# Patient Record
Sex: FEMALE | Race: WHITE | NOT HISPANIC OR LATINO | Employment: OTHER | URBAN - METROPOLITAN AREA
[De-identification: names, ages, dates, MRNs, and addresses within clinical notes are randomized per-mention and may not be internally consistent; named-entity substitution may affect disease eponyms.]

---

## 2018-03-07 PROBLEM — R44.3 HALLUCINATIONS: Status: ACTIVE | Noted: 2018-03-07

## 2019-02-20 PROBLEM — F23 ACUTE PSYCHOSIS: Status: ACTIVE | Noted: 2019-02-20

## 2019-02-21 PROBLEM — F33.2 SEVERE RECURRENT MAJOR DEPRESSION WITHOUT PSYCHOTIC FEATURES: Status: ACTIVE | Noted: 2019-02-21

## 2019-02-21 PROBLEM — F15.20 METHAMPHETAMINE USE DISORDER, SEVERE: Status: ACTIVE | Noted: 2019-02-21

## 2019-02-21 PROBLEM — F23 BRIEF PSYCHOTIC DISORDER: Status: ACTIVE | Noted: 2019-02-21

## 2019-02-28 ENCOUNTER — PATIENT OUTREACH (OUTPATIENT)
Dept: ADMINISTRATIVE | Facility: CLINIC | Age: 59
End: 2019-02-28

## 2019-04-09 PROBLEM — F39 MOOD DISORDER: Status: ACTIVE | Noted: 2019-04-09

## 2019-07-30 PROBLEM — F20.0 PARANOID SCHIZOPHRENIA: Status: ACTIVE | Noted: 2019-07-30

## 2019-08-08 ENCOUNTER — PATIENT OUTREACH (OUTPATIENT)
Dept: ADMINISTRATIVE | Facility: CLINIC | Age: 59
End: 2019-08-08

## 2019-08-08 NOTE — PATIENT INSTRUCTIONS
Depression  Depression is one of the most common mental health problems today. It is not just a state of unhappiness or sadness. It is a true disease. The cause seems to be related to a decrease in chemicals that transmit signals in the brain. Having a family history of depression, alcoholism, or suicide increases the risk. Chronic illness, chronic pain, migraine headaches and high emotional stress also increase the risk.  Depression is something we tend to recognize in others, but may have a hard time seeing in ourselves. It can show in many physical and emotional ways:  · Loss of appetite  · Over-eating  · Not being able to sleep  · Sleeping too much  · Tiredness not related to physical exertion  · Restlessness or irritability  · Slowness of movement or speech  · Feeling depressed or withdrawn  · Loss of interest in things you once enjoyed  · Trouble concentrating, poor memory, trouble making decisions  · Thoughts of harming or killing oneself, or thoughts that life is not worth living  · Low self-esteem  The treatment for depression may include both medicine and psychotherapy. Antidepressants can reduce suffering and can improve the ability to function during the depressed period. Therapy can offer emotional support and help you understand emotional factors that may be causing the depression.  Home care  · On-going care and support helps people manage this disease.  Find a healthcare provider and therapist who meet your needs. Seek help when you feel like you may be getting ill.  · Be kind to yourself. Make it a point to do things that you enjoy (gardening, walking in nature, going to a movie, etc.). Reward yourself for small successes.  · Take care of your physical body. Eat a balanced diet (low in saturated fat and high in fruits and vegetables). Exercise at least 3 times a week for 30 minutes. Even mild-moderate exercise (like brisk walking) can make you feel better.  · Avoid alcohol, which can make  depression worse.  · Take medicine as prescribed.  · Tell each of your healthcare providers about all of the prescription drugs, over-the-counter medicines, vitamins, and supplements you take. Certain supplements interact with medicines and can result in dangerous side effects. Ask your pharmacist when you have questions about drug interactions.  · Talk with your family and trusted friends about your feelings and thoughts. Ask them to help you recognize behavior changes early so you can get help and, if needed, medicine can be adjusted.  Follow-up care  Follow up with your healthcare provider, or as advised.  Call 911  Call 911 if you:  · Have suicidal thoughts, a suicide plan, and the means to carry out the plan  · Have trouble breathing  · Are very confused  · Feel very drowsy or have trouble awakening  · Faint or lose consciousness  · Have new chest pain that becomes more severe, lasts longer, or spreads into your shoulder, arm, neck, jaw or back  When to seek medical advice  Call your healthcare provider right away if any of these occur:  · Feeling extreme depression, fear, anxiety, or anger toward yourself or others  · Feeling out of control  · Feeling that you may try to harm yourself or another  · Hearing voices that others do not hear  · Seeing things that others do not see  · Cant sleep or eat for 3 days in a row  · Friends or family express concern over your behavior and ask you to seek help  Date Last Reviewed: 9/29/2015  © 8432-3162 [employer]. [address] [place] [address], [place] [address]. All rights reserved. This information is not intended as a substitute for professional medical care. Always follow your healthcare professional's instructions.

## 2019-09-23 PROBLEM — F32.1 CURRENT MODERATE EPISODE OF MAJOR DEPRESSIVE DISORDER: Status: ACTIVE | Noted: 2019-09-23

## 2019-10-04 ENCOUNTER — PATIENT OUTREACH (OUTPATIENT)
Dept: ADMINISTRATIVE | Facility: CLINIC | Age: 59
End: 2019-10-04

## 2019-10-04 NOTE — PATIENT INSTRUCTIONS
Depression  Depression is one of the most common mental health problems today. It is not just a state of unhappiness or sadness. It is a true disease. The cause seems to be related to a decrease in chemicals that transmit signals in the brain. Having a family history of depression, alcoholism, or suicide increases the risk. Chronic illness, chronic pain, migraine headaches and high emotional stress also increase the risk.  Depression is something we tend to recognize in others, but may have a hard time seeing in ourselves. It can show in many physical and emotional ways:  · Loss of appetite  · Over-eating  · Not being able to sleep  · Sleeping too much  · Tiredness not related to physical exertion  · Restlessness or irritability  · Slowness of movement or speech  · Feeling depressed or withdrawn  · Loss of interest in things you once enjoyed  · Trouble concentrating, poor memory, trouble making decisions  · Thoughts of harming or killing oneself, or thoughts that life is not worth living  · Low self-esteem  The treatment for depression may include both medicine and psychotherapy. Antidepressants can reduce suffering and can improve the ability to function during the depressed period. Therapy can offer emotional support and help you understand emotional factors that may be causing the depression.  Home care  · On-going care and support helps people manage this disease.  Find a healthcare provider and therapist who meet your needs. Seek help when you feel like you may be getting ill.  · Be kind to yourself. Make it a point to do things that you enjoy (gardening, walking in nature, going to a movie, etc.). Reward yourself for small successes.  · Take care of your physical body. Eat a balanced diet (low in saturated fat and high in fruits and vegetables). Exercise at least 3 times a week for 30 minutes. Even mild-moderate exercise (like brisk walking) can make you feel better.  · Avoid alcohol, which can make  depression worse.  · Take medicine as prescribed.  · Tell each of your healthcare providers about all of the prescription drugs, over-the-counter medicines, vitamins, and supplements you take. Certain supplements interact with medicines and can result in dangerous side effects. Ask your pharmacist when you have questions about drug interactions.  · Talk with your family and trusted friends about your feelings and thoughts. Ask them to help you recognize behavior changes early so you can get help and, if needed, medicine can be adjusted.  Follow-up care  Follow up with your healthcare provider, or as advised.  Call 911  Call 911 if you:  · Have suicidal thoughts, a suicide plan, and the means to carry out the plan  · Have trouble breathing  · Are very confused  · Feel very drowsy or have trouble awakening  · Faint or lose consciousness  · Have new chest pain that becomes more severe, lasts longer, or spreads into your shoulder, arm, neck, jaw or back  When to seek medical advice  Call your healthcare provider right away if any of these occur:  · Feeling extreme depression, fear, anxiety, or anger toward yourself or others  · Feeling out of control  · Feeling that you may try to harm yourself or another  · Hearing voices that others do not hear  · Seeing things that others do not see  · Cant sleep or eat for 3 days in a row  · Friends or family express concern over your behavior and ask you to seek help  Date Last Reviewed: 9/29/2015  © 8238-0756 [employer]. [address] [place] [address], [place] [address]. All rights reserved. This information is not intended as a substitute for professional medical care. Always follow your healthcare professional's instructions.

## 2020-01-21 PROBLEM — F32.A DEPRESSION: Status: ACTIVE | Noted: 2020-01-21

## 2020-01-22 PROBLEM — F33.3 SEVERE RECURRENT MAJOR DEPRESSION WITH PSYCHOTIC FEATURES: Status: ACTIVE | Noted: 2020-01-22

## 2020-01-31 ENCOUNTER — PATIENT OUTREACH (OUTPATIENT)
Dept: ADMINISTRATIVE | Facility: CLINIC | Age: 60
End: 2020-01-31

## 2020-01-31 RX ORDER — CLONAZEPAM 1 MG/1
1 TABLET ORAL 2 TIMES DAILY
COMMUNITY

## 2020-01-31 RX ORDER — IBUPROFEN 200 MG
200 TABLET ORAL EVERY 6 HOURS PRN
COMMUNITY
End: 2022-03-31

## 2020-01-31 NOTE — PATIENT INSTRUCTIONS
Schizophrenia (General Type)  Schizophrenia is a chronic, often disabling mental health disorder that makes functioning in work and society difficult. It is a type of psychosis, and involves perceiving reality differently from those around you. The difference been reality and what you think become blurred in your mind.  The cause of schizophrenia is not yet known. It is believed to be a result of genetic and biological factors (brain chemistry and structure). Schizophrenia does run in families and occurs in about 1% of the adult population. Environmental factors may also have a role in schizophrenia. These may include where you grew up, toxins, and infections.  Symptoms include:  · Hallucinations (seeing or hearing things that are not there)  · Delusions (false beliefs)  · Disorganized thinking and speech  · Social withdrawal  · Severe anxiety  · Feeling unreal  · Paranoia  · Insomnia  · Trouble thinking or concentrating clearly  · Depression, feeling suicidal  · Withdrawal from those around you  Medicines and therapy can help with many of the symptoms and allow for better daily function and quality of life. These medicines take 2 to 4 weeks to start working and 6 to 8 weeks to take full effect.  It is common to feel that you are not ill and that you don't need treatment. It is important to accept the support of friends and family in continuing to take your medicine.  Home care  · Ongoing care and support helps manage this disease. Find a healthcare provider and therapist who meet your needs. Seek help when you feel like your symptoms are getting worse.  · Tell each of your healthcare providers about all of the prescription medicines, over-the-counter medicines, and supplements you take. Certain supplements interact with medicines and can cause dangerous side effects. Ask your pharmacist when you have questions about drug interactions.  · Be sure to take all of your medicine as directed and get regular blood work  to check your medicine level and your overall health. Take the medicines and get the follow-up lab work as prescribed, even if you think you dont need it.  · Seek support from trusted friends or family by talking about your feelings and thoughts. Ask them to help you recognize behavior changes early so you can get help and medicines can be adjusted.  · If you are having trouble managing workplace issues, or caring for yourself because of your schizophrenia, contact your local Americans with Disabilities (ADA) office to see if they can help. The [place] of [employer] operates a toll-free ADA information line at: 222.832.6031 (voice) or 628-801-5527 (TTY). They can help you locate a local office.  Follow-up care  Follow up with your doctor or therapist , or as advised.  Call 911  Call 911 if you:  · Have suicidal thoughts, a suicide plan, and the means to carry out the plan  · Have trouble breathing  · Are very confused  · Are very drowsy or have trouble awakening  · Feel faint or lose consciousness  · Have rapid heart rate, very low heart rate, or a new irregular heart rate  · Have a seizure  When to seek medical advice  Call your healthcare provider right away if any of these occur:  · Your symptoms are getting worse  · Family or friends express concern over your behavior and ask you to seek help  · Feeling out of control or that you are being controlled by others  · Feeling like you want to harm yourself or another  · Unable to care for yourself  · Worsening hallucinations (hearing voices)  · Hearing voices that are telling you to harm yourself or others  · Worsening depression or anxiety  Date Last Reviewed: 9/29/2015  © 9269-5721 [employer]. [address] [place] [address], [place] [address]. All rights reserved. This information is not intended as a substitute for professional medical care. Always follow your healthcare professional's instructions.

## 2021-02-13 ENCOUNTER — NURSE TRIAGE (OUTPATIENT)
Dept: ADMINISTRATIVE | Facility: CLINIC | Age: 61
End: 2021-02-13

## 2021-03-09 PROBLEM — Z87.81 HISTORY OF HUMERUS FRACTURE: Status: ACTIVE | Noted: 2021-03-09

## 2021-03-09 PROBLEM — M25.60 STIFFNESS IN JOINT: Status: ACTIVE | Noted: 2021-03-09

## 2021-03-09 PROBLEM — R52 PAIN: Status: ACTIVE | Noted: 2021-03-09

## 2021-03-09 PROBLEM — R53.1 WEAKNESS: Status: ACTIVE | Noted: 2021-03-09

## 2021-05-06 ENCOUNTER — PATIENT MESSAGE (OUTPATIENT)
Dept: RESEARCH | Facility: HOSPITAL | Age: 61
End: 2021-05-06

## 2021-07-01 ENCOUNTER — PATIENT MESSAGE (OUTPATIENT)
Dept: ADMINISTRATIVE | Facility: OTHER | Age: 61
End: 2021-07-01

## 2022-12-01 PROBLEM — C18.9 COLON CANCER: Status: ACTIVE | Noted: 2022-12-01

## 2022-12-06 ENCOUNTER — PATIENT MESSAGE (OUTPATIENT)
Dept: ADMINISTRATIVE | Facility: CLINIC | Age: 62
End: 2022-12-06
Payer: MEDICAID

## 2022-12-06 ENCOUNTER — PATIENT OUTREACH (OUTPATIENT)
Dept: ADMINISTRATIVE | Facility: CLINIC | Age: 62
End: 2022-12-06
Payer: MEDICAID

## 2022-12-06 NOTE — TELEPHONE ENCOUNTER
Spoke with pt attempted to schedule a hospital follow up appointment. Pt doesn't want to come 12/13/22 at 9:00 bc she has another appt. Pt states she doesn't want to come in for an appointment right now or schedule an appointment right now. Pt states she will call back tomorrow to schedule an appointment.

## 2022-12-06 NOTE — PROGRESS NOTES
C3 nurse attempted to contact Tresa Henry for a TCC post hospital discharge follow up call. No answer. No voicemail available.The patient does not have a scheduled HOSFU appointment. Message sent to PCP staff for assistance with scheduling visit with patient.

## 2023-01-09 ENCOUNTER — PATIENT MESSAGE (OUTPATIENT)
Dept: ADMINISTRATIVE | Facility: HOSPITAL | Age: 63
End: 2023-01-09
Payer: MEDICAID

## 2023-01-09 DIAGNOSIS — C18.2 MALIGNANT NEOPLASM OF ASCENDING COLON: Primary | ICD-10-CM

## 2023-02-03 PROBLEM — E78.49 OTHER HYPERLIPIDEMIA: Status: ACTIVE | Noted: 2023-02-03

## 2023-03-28 ENCOUNTER — PATIENT OUTREACH (OUTPATIENT)
Dept: ADMINISTRATIVE | Facility: HOSPITAL | Age: 63
End: 2023-03-28
Payer: MEDICAID

## 2023-04-03 ENCOUNTER — PATIENT MESSAGE (OUTPATIENT)
Dept: ADMINISTRATIVE | Facility: HOSPITAL | Age: 63
End: 2023-04-03
Payer: MEDICAID

## 2023-08-22 NOTE — PROGRESS NOTES
[phone] No answer no mailbox  [phone] Daughter's number  C3 nurse attempted to contact patient. No answer.  C3 nurse attempted to contact Tresa Henry for a TCC post hospital discharge follow up call. No answer at phone number listed and no voicemail available. The patient has a HOS appointment with [facility] on 03/04/19 @ 1030 hrs.  
TCC Script completed with patient. Needing direction multiple times. Rambles incoherently, difficult to comprehend at interval. Patient reports had a seizure, unable to describe event. Jumps from topic to topic  
regular

## 2023-12-21 ENCOUNTER — PATIENT OUTREACH (OUTPATIENT)
Dept: ADMINISTRATIVE | Facility: HOSPITAL | Age: 63
End: 2023-12-21
Payer: MEDICAID

## 2025-01-05 PROBLEM — J18.9 PNEUMONIA: Status: ACTIVE | Noted: 2025-01-05

## 2025-01-05 PROBLEM — F19.10 SUBSTANCE ABUSE: Status: ACTIVE | Noted: 2025-01-05

## 2025-01-05 PROBLEM — D72.810 LYMPHOPENIA: Status: ACTIVE | Noted: 2025-01-05

## 2025-01-05 PROBLEM — N17.9 AKI (ACUTE KIDNEY INJURY): Status: ACTIVE | Noted: 2025-01-05

## 2025-01-05 PROBLEM — R65.20 SEPSIS WITH ACUTE ORGAN DYSFUNCTION: Status: ACTIVE | Noted: 2025-01-05

## 2025-01-05 PROBLEM — B18.2 CHRONIC HEPATITIS C VIRUS INFECTION: Status: ACTIVE | Noted: 2025-01-05

## 2025-01-05 PROBLEM — D69.6 THROMBOCYTOPENIA: Status: ACTIVE | Noted: 2025-01-05

## 2025-01-05 PROBLEM — A41.9 SEPSIS WITH ACUTE ORGAN DYSFUNCTION: Status: ACTIVE | Noted: 2025-01-05

## 2025-01-05 PROBLEM — R41.82 ALTERED MENTAL STATUS: Status: ACTIVE | Noted: 2025-01-05

## 2025-01-06 PROBLEM — D70.3 NEUTROPENIA ASSOCIATED WITH INFECTION: Status: ACTIVE | Noted: 2025-01-06

## 2025-01-06 PROBLEM — J96.01 ACUTE RESPIRATORY FAILURE WITH HYPOXIA: Status: ACTIVE | Noted: 2025-01-06

## 2025-01-06 PROBLEM — G93.40 ENCEPHALOPATHY ACUTE: Status: ACTIVE | Noted: 2025-01-06

## 2025-01-07 PROBLEM — J80 ARDS (ADULT RESPIRATORY DISTRESS SYNDROME): Status: ACTIVE | Noted: 2025-01-07

## 2025-01-09 PROBLEM — R33.9 URINARY RETENTION: Status: ACTIVE | Noted: 2025-01-09

## 2025-01-10 PROBLEM — G93.40 ENCEPHALOPATHY ACUTE: Status: RESOLVED | Noted: 2025-01-06 | Resolved: 2025-01-10

## 2025-01-10 PROBLEM — J80 ARDS (ADULT RESPIRATORY DISTRESS SYNDROME): Status: RESOLVED | Noted: 2025-01-07 | Resolved: 2025-01-10

## 2025-01-11 PROBLEM — J93.83 SPONTANEOUS PNEUMOTHORAX: Status: ACTIVE | Noted: 2025-01-11

## 2025-01-11 PROBLEM — J93.9 PNEUMOTHORAX: Status: ACTIVE | Noted: 2025-01-11

## 2025-01-11 PROBLEM — R78.81 MSSA BACTEREMIA: Status: ACTIVE | Noted: 2025-01-11

## 2025-01-11 PROBLEM — J09.X1 INFLUENZA A WITH PNEUMONIA: Status: ACTIVE | Noted: 2025-01-11

## 2025-01-11 PROBLEM — B95.61 MSSA BACTEREMIA: Status: ACTIVE | Noted: 2025-01-11

## 2025-01-13 PROBLEM — J93.9 PNEUMOTHORAX: Status: RESOLVED | Noted: 2025-01-11 | Resolved: 2025-01-13

## 2025-01-13 PROBLEM — J96.01 ACUTE RESPIRATORY FAILURE WITH HYPOXIA: Status: RESOLVED | Noted: 2025-01-06 | Resolved: 2025-01-13

## 2025-01-14 PROBLEM — D70.3 NEUTROPENIA ASSOCIATED WITH INFECTION: Status: ACTIVE | Noted: 2025-01-14

## 2025-01-15 PROBLEM — E87.0 HYPERNATREMIA: Status: ACTIVE | Noted: 2025-01-15

## 2025-01-17 PROBLEM — J93.12 SECONDARY SPONTANEOUS PNEUMOTHORAX: Status: ACTIVE | Noted: 2025-01-11

## 2025-01-18 PROBLEM — G62.81 CRITICAL ILLNESS NEUROPATHY: Status: ACTIVE | Noted: 2025-01-18

## 2025-01-19 PROBLEM — R41.0 DELIRIUM: Status: ACTIVE | Noted: 2025-01-19

## 2025-01-19 PROBLEM — T80.211A CLABSI (CENTRAL LINE-ASSOCIATED BLOODSTREAM INFECTION): Status: ACTIVE | Noted: 2025-01-19

## 2025-01-20 ENCOUNTER — HOSPITAL ENCOUNTER (INPATIENT)
Facility: HOSPITAL | Age: 65
LOS: 10 days | Discharge: OTHER INSTITUTION | DRG: 871 | End: 2025-01-30
Attending: INTERNAL MEDICINE | Admitting: INTERNAL MEDICINE
Payer: MEDICAID

## 2025-01-20 DIAGNOSIS — F28 OTHER PSYCHOTIC DISORDER NOT DUE TO SUBSTANCE OR KNOWN PHYSIOLOGICAL CONDITION: ICD-10-CM

## 2025-01-20 DIAGNOSIS — G93.40 ACUTE ENCEPHALOPATHY: ICD-10-CM

## 2025-01-20 DIAGNOSIS — G93.41 ENCEPHALOPATHY, METABOLIC: ICD-10-CM

## 2025-01-20 DIAGNOSIS — R78.81 BACTEREMIA: ICD-10-CM

## 2025-01-20 DIAGNOSIS — G93.40 ENCEPHALOPATHY ACUTE: Primary | ICD-10-CM

## 2025-01-20 DIAGNOSIS — G57.31: ICD-10-CM

## 2025-01-20 DIAGNOSIS — R07.9 CHEST PAIN: ICD-10-CM

## 2025-01-20 PROCEDURE — 63600175 PHARM REV CODE 636 W HCPCS

## 2025-01-20 PROCEDURE — 94640 AIRWAY INHALATION TREATMENT: CPT

## 2025-01-20 PROCEDURE — 25000003 PHARM REV CODE 250

## 2025-01-20 PROCEDURE — 99900035 HC TECH TIME PER 15 MIN (STAT)

## 2025-01-20 PROCEDURE — 94761 N-INVAS EAR/PLS OXIMETRY MLT: CPT

## 2025-01-20 PROCEDURE — 86592 SYPHILIS TEST NON-TREP QUAL: CPT

## 2025-01-20 PROCEDURE — 27000221 HC OXYGEN, UP TO 24 HOURS

## 2025-01-20 PROCEDURE — 99291 CRITICAL CARE FIRST HOUR: CPT | Mod: ,,, | Performed by: INTERNAL MEDICINE

## 2025-01-20 PROCEDURE — 86593 SYPHILIS TEST NON-TREP QUANT: CPT

## 2025-01-20 PROCEDURE — 25000242 PHARM REV CODE 250 ALT 637 W/ HCPCS: Performed by: INTERNAL MEDICINE

## 2025-01-20 PROCEDURE — 20000000 HC ICU ROOM

## 2025-01-20 PROCEDURE — 51702 INSERT TEMP BLADDER CATH: CPT

## 2025-01-20 RX ORDER — GADOBUTROL 604.72 MG/ML
7 INJECTION INTRAVENOUS
Status: COMPLETED | OUTPATIENT
Start: 2025-01-21 | End: 2025-01-20

## 2025-01-20 RX ORDER — SODIUM CHLORIDE 0.9 % (FLUSH) 0.9 %
10 SYRINGE (ML) INJECTION EVERY 12 HOURS PRN
Status: DISCONTINUED | OUTPATIENT
Start: 2025-01-20 | End: 2025-01-30 | Stop reason: HOSPADM

## 2025-01-20 RX ORDER — FOLIC ACID 5 MG/ML
1 INJECTION, SOLUTION INTRAMUSCULAR; INTRAVENOUS; SUBCUTANEOUS DAILY
Status: DISCONTINUED | OUTPATIENT
Start: 2025-01-21 | End: 2025-01-21

## 2025-01-20 RX ORDER — NALOXONE HCL 0.4 MG/ML
0.02 VIAL (ML) INJECTION
Status: DISCONTINUED | OUTPATIENT
Start: 2025-01-20 | End: 2025-01-30 | Stop reason: HOSPADM

## 2025-01-20 RX ORDER — PANTOPRAZOLE SODIUM 40 MG/10ML
40 INJECTION, POWDER, LYOPHILIZED, FOR SOLUTION INTRAVENOUS DAILY
Status: DISCONTINUED | OUTPATIENT
Start: 2025-01-20 | End: 2025-01-25

## 2025-01-20 RX ORDER — CEFAZOLIN 2 G/1
2 INJECTION, POWDER, FOR SOLUTION INTRAMUSCULAR; INTRAVENOUS
Status: DISCONTINUED | OUTPATIENT
Start: 2025-01-20 | End: 2025-01-20

## 2025-01-20 RX ORDER — GLUCAGON 1 MG
1 KIT INJECTION
Status: DISCONTINUED | OUTPATIENT
Start: 2025-01-20 | End: 2025-01-27

## 2025-01-20 RX ORDER — LEVALBUTEROL INHALATION SOLUTION 0.63 MG/3ML
0.31 SOLUTION RESPIRATORY (INHALATION) EVERY 12 HOURS
Status: DISCONTINUED | OUTPATIENT
Start: 2025-01-20 | End: 2025-01-21

## 2025-01-20 RX ORDER — OLANZAPINE 5 MG/1
5 TABLET, ORALLY DISINTEGRATING ORAL DAILY
Status: DISCONTINUED | OUTPATIENT
Start: 2025-01-20 | End: 2025-01-22

## 2025-01-20 RX ORDER — IBUPROFEN 200 MG
24 TABLET ORAL
Status: DISCONTINUED | OUTPATIENT
Start: 2025-01-20 | End: 2025-01-22

## 2025-01-20 RX ORDER — CHLORHEXIDINE GLUCONATE ORAL RINSE 1.2 MG/ML
15 SOLUTION DENTAL 2 TIMES DAILY
Status: DISCONTINUED | OUTPATIENT
Start: 2025-01-20 | End: 2025-01-30 | Stop reason: HOSPADM

## 2025-01-20 RX ORDER — DEXTROSE MONOHYDRATE 50 MG/ML
INJECTION, SOLUTION INTRAVENOUS CONTINUOUS
Status: DISCONTINUED | OUTPATIENT
Start: 2025-01-20 | End: 2025-01-22

## 2025-01-20 RX ORDER — ENOXAPARIN SODIUM 100 MG/ML
40 INJECTION SUBCUTANEOUS EVERY 24 HOURS
Status: DISCONTINUED | OUTPATIENT
Start: 2025-01-20 | End: 2025-01-21

## 2025-01-20 RX ORDER — THIAMINE HYDROCHLORIDE 100 MG/ML
100 INJECTION, SOLUTION INTRAMUSCULAR; INTRAVENOUS DAILY
Status: DISCONTINUED | OUTPATIENT
Start: 2025-01-21 | End: 2025-01-21

## 2025-01-20 RX ORDER — IBUPROFEN 200 MG
16 TABLET ORAL
Status: DISCONTINUED | OUTPATIENT
Start: 2025-01-20 | End: 2025-01-22

## 2025-01-20 RX ORDER — ACETAMINOPHEN 325 MG/1
650 TABLET ORAL EVERY 8 HOURS PRN
Status: DISCONTINUED | OUTPATIENT
Start: 2025-01-20 | End: 2025-01-22

## 2025-01-20 RX ORDER — BENZTROPINE MESYLATE 1 MG/1
1 TABLET ORAL 2 TIMES DAILY
Status: DISCONTINUED | OUTPATIENT
Start: 2025-01-20 | End: 2025-01-22

## 2025-01-20 RX ORDER — LEVETIRACETAM 500 MG/5ML
500 INJECTION, SOLUTION, CONCENTRATE INTRAVENOUS EVERY 12 HOURS
Status: DISCONTINUED | OUTPATIENT
Start: 2025-01-20 | End: 2025-01-25

## 2025-01-20 RX ORDER — LEVALBUTEROL INHALATION SOLUTION 0.31 MG/3ML
0.31 SOLUTION RESPIRATORY (INHALATION) EVERY 12 HOURS
Status: DISCONTINUED | OUTPATIENT
Start: 2025-01-20 | End: 2025-01-20

## 2025-01-20 RX ORDER — CEFAZOLIN 2 G/1
2 INJECTION, POWDER, FOR SOLUTION INTRAMUSCULAR; INTRAVENOUS
Status: DISCONTINUED | OUTPATIENT
Start: 2025-01-20 | End: 2025-01-25

## 2025-01-20 RX ADMIN — DEXTROSE MONOHYDRATE: 50 INJECTION, SOLUTION INTRAVENOUS at 05:01

## 2025-01-20 RX ADMIN — BENZTROPINE MESYLATE 1 MG: 1 TABLET ORAL at 09:01

## 2025-01-20 RX ADMIN — LEVETIRACETAM 500 MG: 100 INJECTION INTRAVENOUS at 09:01

## 2025-01-20 RX ADMIN — CHLORHEXIDINE GLUCONATE 0.12% ORAL RINSE 15 ML: 1.2 LIQUID ORAL at 09:01

## 2025-01-20 RX ADMIN — OLANZAPINE 5 MG: 5 TABLET, ORALLY DISINTEGRATING ORAL at 12:01

## 2025-01-20 RX ADMIN — ENOXAPARIN SODIUM 40 MG: 40 INJECTION SUBCUTANEOUS at 06:01

## 2025-01-20 RX ADMIN — GADOBUTROL 7 ML: 604.72 INJECTION INTRAVENOUS at 11:01

## 2025-01-20 RX ADMIN — HYDROCORTISONE SODIUM SUCCINATE 50 MG: 100 INJECTION, POWDER, FOR SOLUTION INTRAMUSCULAR; INTRAVENOUS at 12:01

## 2025-01-20 RX ADMIN — HYDROCORTISONE SODIUM SUCCINATE 50 MG: 100 INJECTION, POWDER, FOR SOLUTION INTRAMUSCULAR; INTRAVENOUS at 09:01

## 2025-01-20 RX ADMIN — LEVALBUTEROL HYDROCHLORIDE 0.31 MG: 0.63 SOLUTION RESPIRATORY (INHALATION) at 11:01

## 2025-01-20 RX ADMIN — CEFAZOLIN 2 G: 2 INJECTION, POWDER, FOR SOLUTION INTRAMUSCULAR; INTRAVENOUS at 08:01

## 2025-01-20 RX ADMIN — LEVALBUTEROL HYDROCHLORIDE 0.31 MG: 0.63 SOLUTION RESPIRATORY (INHALATION) at 08:01

## 2025-01-20 RX ADMIN — BENZTROPINE MESYLATE 1 MG: 1 TABLET ORAL at 12:01

## 2025-01-20 RX ADMIN — CEFAZOLIN 2 G: 2 INJECTION, POWDER, FOR SOLUTION INTRAMUSCULAR; INTRAVENOUS at 12:01

## 2025-01-20 RX ADMIN — PANTOPRAZOLE SODIUM 40 MG: 40 INJECTION, POWDER, FOR SOLUTION INTRAVENOUS at 09:01

## 2025-01-20 NOTE — ASSESSMENT & PLAN NOTE
Lack of sleep during hospital stay and new neurological deficits reported. C/f Hypoactive delirium. Hx of psychiatric conditions, with sepsis.     - delirium precautions  - early mobility  - avoid over sedation

## 2025-01-20 NOTE — SUBJECTIVE & OBJECTIVE
Past Medical History:   Diagnosis Date    Addiction to drug 1984    [facility]    Anxiety 11/1984    [facility].     Bipolar 1 disorder     Depression 1984    [facility] in [place].     Encounter for blood transfusion     Hallucination 2018    [facility].     History of psychiatric hospitalization 1984    Patient stated she has been hosptialiized 20 times.     History of psychiatric hospitalization 2018    3/7/18 [facility]    History of psychiatric hospitalization 2019    2/20/19; 4/9/19; 7/31/19 9/23/19 [facility]    Hx of psychiatric care 2015    Tegretal, Topamax, Thorazin, cogentin, Lithium     Liver disease     Psychiatric problem 1990    Anxiety (generalized anxiety disorder.)    Schizophrenia 1984    [facility]    Seizures     Substance abuse     Suicide attempt 11/01/1984    [facility]    [facility]    Weakness 03/09/2021    Withdrawal symptoms, drug or narcotic        Past Surgical History:   Procedure Laterality Date    COLON RESECTION      COLONOSCOPY N/A 11/14/2022    Procedure: COLONOSCOPY;  Surgeon: Maggie Arriaza MD;  Location: [facility];  Service: Endoscopy;  Laterality: N/A;    COLONOSCOPY N/A 03/05/2024    Procedure: COLONOSCOPY;  Surgeon: Amadeo Crawford MD;  Location: [facility];  Service: General;  Laterality: N/A;    ESOPHAGOGASTRODUODENOSCOPY N/A 11/14/2022    Procedure: EGD (ESOPHAGOGASTRODUODENOSCOPY);  Surgeon: Maggie Arriaza MD;  Location: [facility];  Service: Endoscopy;  Laterality: N/A;    HYSTERECTOMY         Review of patient's allergies indicates:   Allergen Reactions    Haldol [haloperidol lactate] Hallucinations    Divalproex Rash    Risperdal [risperidone] Palpitations       Family History       Problem Relation (Age of Onset)    Liver cancer Brother    No Known Problems Father, Sister, Maternal Aunt    Schizophrenia Mother          Tobacco Use     Smoking status: Every Day     Current packs/day: 0.50     Average packs/day: 0.5 packs/day for 40.1 years (20.0 ttl pk-yrs)     Types: Cigarettes     Start date: 1975     Last attempt to quit: 12/21/2012     Passive exposure: Never    Smokeless tobacco: Never    Tobacco comments:     Pt declined smoking cessation services at this time 12/21/2023, updated smoking status   Substance and Sexual Activity    Alcohol use: No    Drug use: Not Currently     Types: Marijuana, Amphetamines    Sexual activity: Not Currently     Partners: Male      Review of Systems   Unable to perform ROS: Acuity of condition   Objective:     Vital Signs (Most Recent):    Vital Signs (24h Range):  Temp:  [97.6 °F (36.4 °C)-98.9 °F (37.2 °C)] 98 °F (36.7 °C)  Pulse:  [] 126  Resp:  [16-40] 31  SpO2:  [84 %-100 %] 95 %  BP: (154-184)/() 168/90      There is no height or weight on file to calculate BMI.      Intake/Output Summary (Last 24 hours) at 1/20/2025 0448  Last data filed at 1/20/2025 0226  Gross per 24 hour   Intake 2698.69 ml   Output 910 ml   Net 1788.69 ml          Physical Exam  Constitutional:       General: She is in acute distress.      Appearance: She is ill-appearing. She is not toxic-appearing or diaphoretic.   HENT:      Head: Normocephalic and atraumatic.   Cardiovascular:      Rate and Rhythm: Tachycardia present.      Pulses: Normal pulses.   Pulmonary:      Effort: Respiratory distress present.   Abdominal:      General: There is no distension.      Tenderness: There is no abdominal tenderness.      Hernia: A hernia is present.   Musculoskeletal:         General: No swelling.      Right lower leg: No edema.      Left lower leg: Edema present.   Skin:     Findings: Bruising and erythema present.   Neurological:      Mental Status: She is disoriented.          Vents:     Lines/Drains/Airways       Drain  Duration                  Urethral Catheter 01/15/25 1038 Non-latex 16 Fr. 4 days         NG/OG Tube  01/17/25 1732 Right nostril 2 days              Peripheral Intravenous Line  Duration                  Peripheral IV - Single Lumen 01/18/25 1500 20 G No Left;Posterior Hand 1 day         Peripheral IV - Single Lumen 01/19/25 0548 20 G Posterior;Right Hand <1 day                  Significant Labs:    CBC/Anemia Profile:  Recent Labs   Lab 01/19/25  0509   WBC 5.69   HGB 8.6*   HCT 27.3*   *   MCV 94   RDW 14.1        Chemistries:  Recent Labs   Lab 01/18/25  1821 01/19/25  0509 01/19/25  1750   * 148* 146*   K 4.1 4.1 3.8   * 113* 113*   CO2 27 25 24   BUN 44* 48* 52*   CREATININE 1.1 1.0 1.0   CALCIUM 7.3* 7.4* 7.5*   ALBUMIN 2.4* 2.4* 2.5*   PROT 6.5 6.3 6.8   BILITOT 0.9 0.9 0.8   ALKPHOS 46 48 47   ALT 10 11 12   AST 28 29 31   MG  --  1.8  --    PHOS  --  3.2  --        ABGs:   Recent Labs   Lab 01/19/25  0530   PH 7.530*   PCO2 36   HCO3 30.10*   POCSATURATED 98.3   BE 6.90*     All pertinent labs within the past 24 hours have been reviewed.    Significant Imaging: I have reviewed all pertinent imaging results/findings within the past 24 hours.  CTA - pulmonary cysts, diffuse interstitial infiltrates w BL GGO, bronchiectasis, no PE        Upper lung zones:       Lower lung zones:        For comparison: CT CAP 10/2024

## 2025-01-20 NOTE — HPI
Tresa Henry is 64 y.o. F with Mhx of f substance use, psychosis, bipolar disorder, HCV, colon cancer, and hyperlipidemia presented to [facility] ICU on 01/05 with AMS. EMS was called to scene where pt was found confused, tachycardic and in pain. Concern for potential drug intoxication and/or withdrawal per EMS. Required admission to ICU for the AMS, Mutlifocal PNA and HUSEYIN. Found to have MSSA bacteremia and positive for influenza A; treated with abx and Tamiflu, respectively. Brief step down from the ICU on 01/09 with improved mentation. Hospital course complicated by large R sided pneumothorax s/p chest tube placement, thrombocytopenia, neutropenia, and hyponatremia. Stepped back to ICU on 01/11 for subsequently requiring intubation, central line, and arterial line for concerns of ARDS; extubated on 01/14. Subsequent Bcx NGTD and NGT placed for nutrition. She required BiPAP intermittently for work breathing for a period of time, but has been on stable nasal cannula recently. Of recent, pt unable to her R foot, followed by inability to arms. Despite prolonged admission and care, patient continued to experience AMS.       With persistent encephalopathy of uncertain etiology, referring team is requested transfer to [facility] for continued treatment of acute encephalopathy.     Prior to transfer :  Na 148, K 4.1, chloride 113, CO2 25, BUN 48, creatinine 1, glucose 144, albumin 2.4, AST 29, ALT 11, magnesium 1.8, phosphorus 2.2, white blood cells 5.69, hemoglobin 8.6, hematocrit 27.3, platelets 118  -pH 7.53, pCO2 36, pO2 84  -chest x-ray showed interval removal of the small bore chest tube from the right hemithorax.  Decrease in the consolidation in lungs bilaterally since the prior examination.

## 2025-01-20 NOTE — ASSESSMENT & PLAN NOTE
Strep pneumo urine antigen positive   MSSA bacteremia; follow-up Bcx cleared  Influenza A positive  UA negative for infectious etiology    Placed on Vanc and Zosyn --> Cefazolin and Zosyn --> Vanc and Rm (changed per ID due to thrombocytopenia and spiking fevers on 01/11). Completed Tamiflu. ID deferred treatment of the maame dubliniensis respiratory culture.    - f/u TTE   - ID consulted appreciate recs

## 2025-01-20 NOTE — ASSESSMENT & PLAN NOTE
Bx 01/05 MSSA. Was on Vanc and Zosyn; however transitioned to Vanc and Meropenem due to thrombocytopenia per ID.     - on ancef at last hospital, cont for now   - f/u echo  - ID consult once RPR labs come back

## 2025-01-20 NOTE — ASSESSMENT & PLAN NOTE
C/f polysubstance abuse. UDS 01/05 presumed positive for THC. Completed Klonopin taper per Psych at OSH for potential benzo withdrawal.
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
## 2025-01-20 NOTE — CONSULTS
[facility] - Medical ICU  Wound Care    Patient Name:  Tresa Henry   MRN:  [id number]  Date: 1/20/2025  Diagnosis: Encephalopathy acute    Pt seen for wound consult- buttocks/sacrum/left heel. Pt awake, making grunting sounds but no words. Explained to pt purpose of visit- for wound assessment; accompanied by primary nurse. Pt turned with assistance- see assessment below. While sacral area is considered unstageable, it is shallow and likely a superficial stage 3. Pt also noted to have IAD/ rash to perianal area and between thighs. Triad wound cream applied. Left heel blister is intact, fluid filled; pt noted to have bruising to other areas of her foot. Applied silver foam to protect and absorb any drainage should bliser rupture. Pt is a transfer from another facility; wounds POA.    Recs:  Triad wound cream to sacrum, BID and prn. PIP measures in place  Heel: cover with foam, wrap in kerlix, change every 5 days and prn, heel offloading boots.     RIVER Wade, RN,[facility] Wound/Ostomy  1/20/25 01/20/25 1450   WOCN Assessment   WOCN Total Time (mins) 45   Visit Date 01/20/25   Visit Time 1450   Consult Type New   WOCN Speciality Wound   Wound pressure;moisture;shearing   Number of Wounds 2   Intervention assessed;changed;applied;chart review;coordination of care   Teaching on-going        Wound 01/13/25 1900 Pressure Injury Sacral spine   Date First Assessed/Time First Assessed: 01/13/25 1900   Present on Original Admission: No  Primary Wound Type: Pressure Injury  Location: Sacral spine   Wound Image    Pressure Injury Stage U   Dressing Appearance Open to air   Drainage Amount None   Drainage Characteristics/Odor No odor   Appearance Yellow;White;Pink;Red;Slough;Moist   Wound Edges Defined   Care Cleansed with:;Other (see comments)  (bath wipe)   Dressing Applied;Other (comment)  (triad wound cream)        Wound 01/20/25 0900 Pressure Injury Left Heel   Date First Assessed/Time First  Assessed: 01/20/25 0900   Present on Original Admission: Yes  Primary Wound Type: Pressure Injury  Side: Left  Location: Heel   Wound Image    Pressure Injury Stage 2   Dressing Appearance Open to air   Drainage Amount None   Drainage Characteristics/Odor No odor   Appearance Red;Pink;Intact;Blistered   Periwound Area Intact;Dry   Wound Edges Defined   Care Cleansed with:;Wound cleanser   Dressing Applied;Silver;Foam;Rolled gauze;Elastic bandage     History:     Past Medical History:   Diagnosis Date    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     Bipolar 1 disorder     Depression [address] [place] [address].     Encounter for blood transfusion     Hallucination 2018    Tactile Hallucinations.     History of psychiatric hospitalization 1984    Patient stated she has been hosptialiized 20 times.     History of psychiatric hospitalization 2018    3/7/18 [facility]    History of psychiatric hospitalization 2019 2/20/19; 4/9/19; 7/31/19 9/23/19 [facility]    Hx of psychiatric care 2015    Tegretal, Topamax, Thorazin, cogentin, Lithium     Liver disease     Psychiatric problem 1990    Anxiety (generalized anxiety disorder.)    Schizophrenia [address] [place] [address]    Seizures     Substance abuse     Suicide attempt 11/01/1984    Therapy 2000    [facility]    Weakness 03/09/2021    Withdrawal symptoms, drug or narcotic        Social History     Socioeconomic History    Marital status: Single    Number of children: 5   Tobacco Use    Smoking status: Every Day     Current packs/day: 0.50     Average packs/day: 0.5 packs/day for 40.1 years (20.0 ttl pk-yrs)     Types: Cigarettes     Start date: 1975     Last attempt to quit: 12/21/2012     Passive exposure: Never    Smokeless tobacco: Never    Tobacco comments:     Pt declined smoking cessation services at this time 12/21/2023, updated smoking status    Substance and Sexual Activity    Alcohol use: No    Drug use: Not Currently     Types: Marijuana, Amphetamines    Sexual activity: Not Currently     Partners: Male   Other Topics Concern    Patient feels they ought to cut down on drinking/drug use No    Patient annoyed by others criticizing their drinking/drug use No    Patient has felt bad or guilty about drinking/drug use No    Patient has had a drink/used drugs as an eye opener in the AM No     Social Drivers of Health     Financial Resource Strain: Low Risk  (1/6/2025)    Overall Financial Resource Strain (CARDIA)     Difficulty of Paying Living Expenses: Not very hard   Food Insecurity: No Food Insecurity (1/6/2025)    Hunger Vital Sign     Worried About Running Out of Food in the Last Year: Never true     Ran Out of Food in the Last Year: Never true   Transportation Needs: No Transportation Needs (1/6/2025)    TRANSPORTATION NEEDS     Transportation : No   Physical Activity: Inactive (1/6/2025)    Exercise Vital Sign     Days of Exercise per Week: 0 days     Minutes of Exercise per Session: 0 min   Stress: No Stress Concern Present (1/6/2025)    [employer] - Occupational Stress Questionnaire     Feeling of Stress : Not at all   Housing Stability: Low Risk  (1/6/2025)    Housing Stability Vital Sign     Unable to Pay for Housing in the Last Year: No     Homeless in the Last Year: No       Precautions:     Allergies as of 01/19/2025 - Reviewed 01/19/2025   Allergen Reaction Noted    Haldol [haloperidol lactate] Hallucinations 04/09/2019    Divalproex Rash 08/22/2022    Risperdal [risperidone] Palpitations 04/09/2019       WOC Assessment Details/Treatment   See above

## 2025-01-20 NOTE — ASSESSMENT & PLAN NOTE
Pt [place] [facility] [place]. She is s/p colon resection in 2022.

## 2025-01-20 NOTE — H&P
[facility] - Medical ICU  Critical Care Medicine  History & Physical    Patient Name: Tresa Henry  MRN: [id number]  Admission Date: 1/20/2025  Hospital Length of Stay: 0 days  Code Status: Full Code  Attending Physician: Rodolfo Groves MD   Primary Care Provider: Bina Holguin NP   Principal Problem: Encephalopathy acute    Subjective:     HPI:  Tresa Henry is 64 y.o. F with Mhx of f substance use, psychosis, bipolar disorder, HCV, colon cancer, and hyperlipidemia presented to [facility] ICU on 01/05 with AMS. EMS was called to scene where pt was found confused, tachycardic and in pain. Concern for potential drug intoxication and/or withdrawal per EMS. Required admission to ICU for the AMS, Mutlifocal PNA and HUSEYIN. Found to have MSSA bacteremia and positive for influenza A; treated with abx and Tamiflu, respectively. Brief step down from the ICU on 01/09 with improved mentation. Hospital course complicated by large R sided pneumothorax s/p chest tube placement, thrombocytopenia, neutropenia, and hyponatremia. Stepped back to ICU on 01/11 for subsequently requiring intubation, central line, and arterial line for concerns of ARDS; extubated on 01/14. Subsequent Bcx NGTD and NGT placed for nutrition. She required BiPAP intermittently for work breathing for a period of time, but has been on stable nasal cannula recently. Of recent, pt unable to her R foot, followed by inability to arms. Despite prolonged admission and care, patient continued to experience AMS.       With persistent encephalopathy of uncertain etiology, referring team is requested transfer to [facility] for continued treatment of acute encephalopathy.     Prior to transfer :  Na 148, K 4.1, chloride 113, CO2 25, BUN 48, creatinine 1, glucose 144, albumin 2.4, AST 29, ALT 11, magnesium 1.8, phosphorus 2.2, white blood cells 5.69, hemoglobin 8.6, hematocrit 27.3, platelets 118  -pH 7.53, pCO2 36, pO2 84  -chest x-ray showed  interval removal of the small bore chest tube from the right hemithorax.  Decrease in the consolidation in lungs bilaterally since the prior examination.    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     Bipolar 1 disorder     Depression 1984    [facility].     Encounter for blood transfusion     Hallucination 2018    Tactile Hallucinations.     History of psychiatric hospitalization 1984    Patient stated she has been hosptialiized 20 times.     History of psychiatric hospitalization 2018    3/7/18 [facility]    History of psychiatric hospitalization 2019    2/20/19; 4/9/19; 7/31/19 9/23/19 [facility]    Hx of psychiatric care 2015    Tegretal, Topamax, Thorazin, cogentin, Lithium     Liver disease     Psychiatric problem 1990    Anxiety (generalized anxiety disorder.)    Schizophrenia 1984    [facility]    Seizures     Substance abuse     Suicide attempt 11/01/1984    Therapy 2000    [facility]    Weakness 03/09/2021    Withdrawal symptoms, drug or narcotic        Past Surgical History:   Procedure Laterality Date    COLON RESECTION      COLONOSCOPY N/A 11/14/2022    Procedure: COLONOSCOPY;  Surgeon: Maggie Arriaza MD;  Location: [facility];  Service: Endoscopy;  Laterality: N/A;    COLONOSCOPY N/A 03/05/2024    Procedure: COLONOSCOPY;  Surgeon: Amadeo Crawford MD;  Location: [facility];  Service: General;  Laterality: N/A;    ESOPHAGOGASTRODUODENOSCOPY N/A 11/14/2022    Procedure: EGD (ESOPHAGOGASTRODUODENOSCOPY);  Surgeon: Maggie Arriaza MD;  Location: [facility];  Service: Endoscopy;  Laterality: N/A;    HYSTERECTOMY         Review of patient's allergies indicates:   Allergen Reactions    Haldol [haloperidol lactate] Hallucinations    Divalproex Rash    Risperdal [risperidone] Palpitations        Family History       Problem Relation (Age of Onset)    Liver cancer Brother    No Known Problems Father, Sister, Maternal Aunt    Schizophrenia Mother          Tobacco Use    Smoking status: Every Day     Current packs/day: 0.50     Average packs/day: 0.5 packs/day for 40.1 years (20.0 ttl pk-yrs)     Types: Cigarettes     Start date: 1975     Last attempt to quit: 12/21/2012     Passive exposure: Never    Smokeless tobacco: Never    Tobacco comments:     Pt declined smoking cessation services at this time 12/21/2023, updated smoking status   Substance and Sexual Activity    Alcohol use: No    Drug use: Not Currently     Types: Marijuana, Amphetamines    Sexual activity: Not Currently     Partners: Male      Review of Systems   Unable to perform ROS: Acuity of condition   Objective:     Vital Signs (Most Recent):    Vital Signs (24h Range):  Temp:  [97.6 °F (36.4 °C)-98.9 °F (37.2 °C)] 98 °F (36.7 °C)  Pulse:  [] 126  Resp:  [16-40] 31  SpO2:  [84 %-100 %] 95 %  BP: (154-184)/() 168/90      There is no height or weight on file to calculate BMI.      Intake/Output Summary (Last 24 hours) at 1/20/2025 0448  Last data filed at 1/20/2025 0226  Gross per 24 hour   Intake 2698.69 ml   Output 910 ml   Net 1788.69 ml          Physical Exam  Constitutional:       General: She is in acute distress.      Appearance: She is ill-appearing. She is not toxic-appearing or diaphoretic.   HENT:      Head: Normocephalic and atraumatic.   Cardiovascular:      Rate and Rhythm: Tachycardia present.      Pulses: Normal pulses.   Pulmonary:      Effort: Respiratory distress present.   Abdominal:      General: There is no distension.      Tenderness: There is no abdominal tenderness.      Hernia: A hernia is present.   Musculoskeletal:         General: No swelling.      Right lower leg: No edema.      Left lower leg: Edema present.   Skin:     Findings: Bruising and erythema present.   Neurological:      Mental Status:  She is disoriented.          Vents:     Lines/Drains/Airways       Drain  Duration                  Urethral Catheter 01/15/25 1038 Non-latex 16 Fr. 4 days         NG/OG Tube 01/17/25 1732 Right nostril 2 days              Peripheral Intravenous Line  Duration                  Peripheral IV - Single Lumen 01/18/25 1500 20 G No Left;Posterior Hand 1 day         Peripheral IV - Single Lumen 01/19/25 0548 20 G Posterior;Right Hand <1 day                  Significant Labs:    CBC/Anemia Profile:  Recent Labs   Lab 01/19/25  0509   WBC 5.69   HGB 8.6*   HCT 27.3*   *   MCV 94   RDW 14.1        Chemistries:  Recent Labs   Lab 01/18/25  1821 01/19/25  0509 01/19/25  1750   * 148* 146*   K 4.1 4.1 3.8   * 113* 113*   CO2 27 25 24   BUN 44* 48* 52*   CREATININE 1.1 1.0 1.0   CALCIUM 7.3* 7.4* 7.5*   ALBUMIN 2.4* 2.4* 2.5*   PROT 6.5 6.3 6.8   BILITOT 0.9 0.9 0.8   ALKPHOS 46 48 47   ALT 10 11 12   AST 28 29 31   MG  --  1.8  --    PHOS  --  3.2  --        ABGs:   Recent Labs   Lab 01/19/25  0530   PH 7.530*   PCO2 36   HCO3 30.10*   POCSATURATED 98.3   BE 6.90*     All pertinent labs within the past 24 hours have been reviewed.    Significant Imaging: I have reviewed all pertinent imaging results/findings within the past 24 hours.  CTA - pulmonary cysts, diffuse interstitial infiltrates w BL GGO, bronchiectasis, no PE        Upper lung zones:       Lower lung zones:        For comparison: CT CAP 10/2024     Assessment/Plan:     Neuro  * Encephalopathy acute  Found by EMS with high suspicion for substance use. UDS THC presumed positive. CT head without acute intracranial pathology. HIV/RPR negative. Hx of untreated Hep C. Persistent AMS. Collateral history provided by sister in the chart reports pt with history of worsening dementia however current mentation is not baseline. Prior to admission, sister reports that patient experience cough and fevers.    Likely multifactorial: metabolic secondary to  electrolyte disturbances, sepsis and/or substance abuse. Likely compounded by delirium given prolonged critical admission. New neurological deficits present per transfer note.    Recent ABG  Recent Labs   Lab 01/19/25  0530   PH 7.530*   PO2 84   PCO2 36   HCO3 30.10*   BE 6.90*     - neurochecks  - aspiration, delirium and fall precautions  - Neurology consult; appreciate recs      Delirium  Lack of sleep during hospital stay and new neurological deficits reported. C/f Hypoactive delirium. Hx of psychiatric conditions, with sepsis.     - delirium precautions  - early mobility  - avoid over sedation    Psychiatric  Substance abuse  C/f polysubstance abuse. UDS 01/05 presumed positive for THC. Completed Klonopin taper per Psych at OSH for potential benzo withdrawal.    Pulmonary  Secondary spontaneous pneumothorax  S/p chest tube placement and adequate re-expansion on repeat CXR. Etiology of pneumothorax potentially from substance inhalation causing lung damage overtime. CT c/a/p 10/2024 with evidence of aerated lungs; however severe lung damage with evidence of ephysema and opacification noted on most recent CT.     Influenza A with pneumonia  RIP 01/05 positive for influenza A. Completed Tamiflu.     ID  MSSA bacteremia  Bxc 01/05 MSSA. Was on Vanc and Zosyn; however transitioned to Vanc and Meropenem due to thrombocytopenia per ID.     - on ancef at last hospital, cont for now   - f/u echo  - ID consult once RPR labs come back       Sepsis with acute organ dysfunction  Strep pneumo urine antigen positive   MSSA bacteremia; follow-up Bcx cleared  Influenza A positive  UA negative for infectious etiology    Placed on Vanc and Zosyn --> Cefazolin and Zosyn --> Vanc and Rm (changed per ID due to thrombocytopenia and spiking fevers on 01/11). Completed Tamiflu. ID deferred treatment of the maame dubliniensis respiratory culture.    - f/u TTE   - ID consulted appreciate recs      Oncology  Colon cancer  Pt follows  [facility] heme/onc. She is s/p colon resection in 2022.         Critical Care Daily Checklist:    A: Awake: RASS Goal/Actual Goal:    Actual:     B: Spontaneous Breathing Trial Performed?     C: SAT & SBT Coordinated?  None                       D: Delirium: CAM-ICU     E: Early Mobility Performed? No   F: Feeding Goal:    Status:     Current Diet Order   Procedures    Diet NPO      AS: Analgesia/Sedation none   T: Thromboembolic Prophylaxis Lovenox    H: HOB > 300 Yes   U: Stress Ulcer Prophylaxis (if needed)    G: Glucose Control yes   B: Bowel Function Stool Occurrence: 1   I: Indwelling Catheter (Lines & Short) Necessity Ng tube, PIV, catheter    D: De-escalation of Antimicrobials/Pharmacotherapies Ancef     Plan for the day/ETD Neuro  consult, CT scan,     Code Status:  Family/Goals of Care: Full Code         Critical secondary to Patient has a condition that poses threat to life and bodily function: AMS     Critical care was time spent personally by me on the following activities: development of treatment plan with patient or surrogate and bedside caregivers, discussions with consultants, evaluation of patient's response to treatment, examination of patient, ordering and performing treatments and interventions, ordering and review of laboratory studies, ordering and review of radiographic studies, pulse oximetry, re-evaluation of patient's condition. This critical care time did not overlap with that of any other provider or involve time for any procedures.     Herrera Cabral DO  Critical Care Medicine  [facility] - [facility] ICU

## 2025-01-20 NOTE — ASSESSMENT & PLAN NOTE
Found by EMS with high suspicion for substance use. UDS THC presumed positive. CT head without acute intracranial pathology. HIV/RPR negative. Hx of untreated Hep C. Persistent AMS. Collateral history provided by sister in the chart reports pt with history of worsening dementia however current mentation is not baseline. Prior to admission, sister reports that patient experience cough and fevers.    Likely multifactorial: metabolic secondary to electrolyte disturbances, sepsis and/or substance abuse. Likely compounded by delirium given prolonged critical admission. New neurological deficits present per transfer note.    Recent ABG  Recent Labs   Lab 01/19/25  0530   PH 7.530*   PO2 84   PCO2 36   HCO3 30.10*   BE 6.90*     - neurochecks  - aspiration, delirium and fall precautions  - Neurology consult; appreciate recs

## 2025-01-21 LAB
ABO + RH BLD: NORMAL
ALBUMIN SERPL BCP-MCNC: 2.1 G/DL (ref 3.5–5.2)
ALP SERPL-CCNC: 41 U/L (ref 40–150)
ALT SERPL W/O P-5'-P-CCNC: <5 U/L (ref 10–44)
AMMONIA PLAS-SCNC: 43 UMOL/L (ref 10–50)
ANION GAP SERPL CALC-SCNC: 8 MMOL/L (ref 8–16)
ANION GAP SERPL CALC-SCNC: 9 MMOL/L (ref 8–16)
AST SERPL-CCNC: 25 U/L (ref 10–40)
BASOPHILS # BLD AUTO: 0 K/UL (ref 0–0.2)
BASOPHILS NFR BLD: 0 % (ref 0–1.9)
BILIRUB SERPL-MCNC: 0.6 MG/DL (ref 0.1–1)
BLD GP AB SCN CELLS X3 SERPL QL: NORMAL
BLD PROD TYP BPU: NORMAL
BLOOD UNIT EXPIRATION DATE: NORMAL
BLOOD UNIT TYPE CODE: 6200
BLOOD UNIT TYPE: NORMAL
BUN SERPL-MCNC: 45 MG/DL (ref 8–23)
BUN SERPL-MCNC: 49 MG/DL (ref 8–23)
CALCIUM SERPL-MCNC: 6.9 MG/DL (ref 8.7–10.5)
CALCIUM SERPL-MCNC: 7 MG/DL (ref 8.7–10.5)
CHLORIDE SERPL-SCNC: 110 MMOL/L (ref 95–110)
CHLORIDE SERPL-SCNC: 111 MMOL/L (ref 95–110)
CLARITY CSF: CLEAR
CO2 SERPL-SCNC: 26 MMOL/L (ref 23–29)
CO2 SERPL-SCNC: 26 MMOL/L (ref 23–29)
CODING SYSTEM: NORMAL
COLOR CSF: COLORLESS
CREAT SERPL-MCNC: 0.9 MG/DL (ref 0.5–1.4)
CREAT SERPL-MCNC: 0.9 MG/DL (ref 0.5–1.4)
CROSSMATCH INTERPRETATION: NORMAL
CSF TUBE NUMBER: 1
CSF TUBE NUMBER: 1
DIFFERENTIAL METHOD BLD: ABNORMAL
DISPENSE STATUS: NORMAL
EOSINOPHIL # BLD AUTO: 0 K/UL (ref 0–0.5)
EOSINOPHIL NFR BLD: 0 % (ref 0–8)
ERYTHROCYTE [DISTWIDTH] IN BLOOD BY AUTOMATED COUNT: 13.9 % (ref 11.5–14.5)
EST. GFR  (NO RACE VARIABLE): >60 ML/MIN/1.73 M^2
EST. GFR  (NO RACE VARIABLE): >60 ML/MIN/1.73 M^2
GLUCOSE CSF-MCNC: 66 MG/DL (ref 40–70)
GLUCOSE SERPL-MCNC: 131 MG/DL (ref 70–110)
GLUCOSE SERPL-MCNC: 140 MG/DL (ref 70–110)
HCT VFR BLD AUTO: 24.3 % (ref 37–48.5)
HGB BLD-MCNC: 7.6 G/DL (ref 12–16)
IMM GRANULOCYTES # BLD AUTO: 0.01 K/UL (ref 0–0.04)
IMM GRANULOCYTES NFR BLD AUTO: 0.3 % (ref 0–0.5)
LYMPHOCYTES # BLD AUTO: 0.4 K/UL (ref 1–4.8)
LYMPHOCYTES NFR BLD: 11.4 % (ref 18–48)
LYMPHOCYTES NFR CSF MANUAL: 67 % (ref 40–80)
MAGNESIUM SERPL-MCNC: 1.7 MG/DL (ref 1.6–2.6)
MCH RBC QN AUTO: 29.9 PG (ref 27–31)
MCHC RBC AUTO-ENTMCNC: 31.3 G/DL (ref 32–36)
MCV RBC AUTO: 96 FL (ref 82–98)
MONOCYTES # BLD AUTO: 0.2 K/UL (ref 0.3–1)
MONOCYTES NFR BLD: 6.6 % (ref 4–15)
MONOS+MACROS NFR CSF MANUAL: 33 % (ref 15–45)
NEUTROPHILS # BLD AUTO: 2.6 K/UL (ref 1.8–7.7)
NEUTROPHILS NFR BLD: 81.7 % (ref 38–73)
NRBC BLD-RTO: 0 /100 WBC
PHOSPHATE SERPL-MCNC: 4 MG/DL (ref 2.7–4.5)
PLATELET # BLD AUTO: 68 K/UL (ref 150–450)
PMV BLD AUTO: 11 FL (ref 9.2–12.9)
POTASSIUM SERPL-SCNC: 3 MMOL/L (ref 3.5–5.1)
POTASSIUM SERPL-SCNC: 4 MMOL/L (ref 3.5–5.1)
PROT CSF-MCNC: 32 MG/DL (ref 15–40)
PROT SERPL-MCNC: 5.7 G/DL (ref 6–8.4)
RBC # BLD AUTO: 2.54 M/UL (ref 4–5.4)
RBC # CSF: 0 /CU MM
SODIUM SERPL-SCNC: 144 MMOL/L (ref 136–145)
SODIUM SERPL-SCNC: 146 MMOL/L (ref 136–145)
SPECIMEN OUTDATE: NORMAL
SPECIMEN VOL CSF: 3 ML
UNIT NUMBER: NORMAL
WBC # BLD AUTO: 3.17 K/UL (ref 3.9–12.7)
WBC # CSF: 1 /CU MM (ref 0–5)

## 2025-01-21 PROCEDURE — P9035 PLATELET PHERES LEUKOREDUCED: HCPCS

## 2025-01-21 PROCEDURE — 63600175 PHARM REV CODE 636 W HCPCS: Performed by: INTERNAL MEDICINE

## 2025-01-21 PROCEDURE — 63600175 PHARM REV CODE 636 W HCPCS

## 2025-01-21 PROCEDURE — 86022 PLATELET ANTIBODIES: CPT

## 2025-01-21 PROCEDURE — 94761 N-INVAS EAR/PLS OXIMETRY MLT: CPT

## 2025-01-21 PROCEDURE — 25000003 PHARM REV CODE 250: Performed by: INTERNAL MEDICINE

## 2025-01-21 PROCEDURE — 82140 ASSAY OF AMMONIA: CPT

## 2025-01-21 PROCEDURE — 62270 DX LMBR SPI PNXR: CPT | Mod: ,,, | Performed by: INTERNAL MEDICINE

## 2025-01-21 PROCEDURE — 83735 ASSAY OF MAGNESIUM: CPT

## 2025-01-21 PROCEDURE — 80053 COMPREHEN METABOLIC PANEL: CPT

## 2025-01-21 PROCEDURE — 87529 HSV DNA AMP PROBE: CPT | Performed by: INTERNAL MEDICINE

## 2025-01-21 PROCEDURE — 80048 BASIC METABOLIC PNL TOTAL CA: CPT | Mod: XB

## 2025-01-21 PROCEDURE — 25000003 PHARM REV CODE 250

## 2025-01-21 PROCEDURE — 92526 ORAL FUNCTION THERAPY: CPT

## 2025-01-21 PROCEDURE — 85025 COMPLETE CBC W/AUTO DIFF WBC: CPT

## 2025-01-21 PROCEDURE — 94640 AIRWAY INHALATION TREATMENT: CPT

## 2025-01-21 PROCEDURE — 87522 HEPATITIS C REVRS TRNSCRPJ: CPT

## 2025-01-21 PROCEDURE — 27000221 HC OXYGEN, UP TO 24 HOURS

## 2025-01-21 PROCEDURE — 25000242 PHARM REV CODE 250 ALT 637 W/ HCPCS: Performed by: INTERNAL MEDICINE

## 2025-01-21 PROCEDURE — 25500020 PHARM REV CODE 255: Performed by: INTERNAL MEDICINE

## 2025-01-21 PROCEDURE — 87070 CULTURE OTHR SPECIMN AEROBIC: CPT | Performed by: INTERNAL MEDICINE

## 2025-01-21 PROCEDURE — 86901 BLOOD TYPING SEROLOGIC RH(D): CPT

## 2025-01-21 PROCEDURE — 36430 TRANSFUSION BLD/BLD COMPNT: CPT

## 2025-01-21 PROCEDURE — 11000001 HC ACUTE MED/SURG PRIVATE ROOM

## 2025-01-21 PROCEDURE — 99291 CRITICAL CARE FIRST HOUR: CPT | Mod: ,,, | Performed by: PSYCHIATRY & NEUROLOGY

## 2025-01-21 PROCEDURE — 84157 ASSAY OF PROTEIN OTHER: CPT | Performed by: INTERNAL MEDICINE

## 2025-01-21 PROCEDURE — 84100 ASSAY OF PHOSPHORUS: CPT

## 2025-01-21 PROCEDURE — 89051 BODY FLUID CELL COUNT: CPT | Performed by: INTERNAL MEDICINE

## 2025-01-21 PROCEDURE — 99900035 HC TECH TIME PER 15 MIN (STAT)

## 2025-01-21 PROCEDURE — 87205 SMEAR GRAM STAIN: CPT | Performed by: INTERNAL MEDICINE

## 2025-01-21 PROCEDURE — 009U3ZZ DRAINAGE OF SPINAL CANAL, PERCUTANEOUS APPROACH: ICD-10-PCS | Performed by: INTERNAL MEDICINE

## 2025-01-21 PROCEDURE — A9585 GADOBUTROL INJECTION: HCPCS | Performed by: INTERNAL MEDICINE

## 2025-01-21 PROCEDURE — 92610 EVALUATE SWALLOWING FUNCTION: CPT

## 2025-01-21 PROCEDURE — 82945 GLUCOSE OTHER FLUID: CPT | Performed by: INTERNAL MEDICINE

## 2025-01-21 PROCEDURE — 99233 SBSQ HOSP IP/OBS HIGH 50: CPT | Mod: 25,,, | Performed by: INTERNAL MEDICINE

## 2025-01-21 RX ORDER — MIDAZOLAM HYDROCHLORIDE 1 MG/ML
INJECTION, SOLUTION INTRAMUSCULAR; INTRAVENOUS
Status: COMPLETED
Start: 2025-01-21 | End: 2025-01-21

## 2025-01-21 RX ORDER — THIAMINE HYDROCHLORIDE 100 MG/ML
100 INJECTION, SOLUTION INTRAMUSCULAR; INTRAVENOUS DAILY
Status: DISPENSED | OUTPATIENT
Start: 2025-01-21 | End: 2025-01-26

## 2025-01-21 RX ORDER — POTASSIUM CHLORIDE 7.45 MG/ML
10 INJECTION INTRAVENOUS
Status: DISCONTINUED | OUTPATIENT
Start: 2025-01-21 | End: 2025-01-21

## 2025-01-21 RX ORDER — MIDAZOLAM HYDROCHLORIDE 1 MG/ML
2 INJECTION, SOLUTION INTRAMUSCULAR; INTRAVENOUS ONCE
Status: COMPLETED | OUTPATIENT
Start: 2025-01-21 | End: 2025-01-21

## 2025-01-21 RX ORDER — MAGNESIUM SULFATE HEPTAHYDRATE 40 MG/ML
2 INJECTION, SOLUTION INTRAVENOUS ONCE
Status: COMPLETED | OUTPATIENT
Start: 2025-01-21 | End: 2025-01-21

## 2025-01-21 RX ORDER — FENTANYL CITRATE 50 UG/ML
INJECTION, SOLUTION INTRAMUSCULAR; INTRAVENOUS
Status: COMPLETED
Start: 2025-01-21 | End: 2025-01-21

## 2025-01-21 RX ORDER — HYDROCODONE BITARTRATE AND ACETAMINOPHEN 500; 5 MG/1; MG/1
TABLET ORAL
Status: DISCONTINUED | OUTPATIENT
Start: 2025-01-21 | End: 2025-01-22

## 2025-01-21 RX ORDER — THIAMINE HYDROCHLORIDE 100 MG/ML
200 INJECTION, SOLUTION INTRAMUSCULAR; INTRAVENOUS DAILY
Status: DISCONTINUED | OUTPATIENT
Start: 2025-01-21 | End: 2025-01-21

## 2025-01-21 RX ORDER — FENTANYL CITRATE 50 UG/ML
50 INJECTION, SOLUTION INTRAMUSCULAR; INTRAVENOUS ONCE
Status: COMPLETED | OUTPATIENT
Start: 2025-01-21 | End: 2025-01-21

## 2025-01-21 RX ADMIN — POTASSIUM BICARBONATE 40 MEQ: 391 TABLET, EFFERVESCENT ORAL at 04:01

## 2025-01-21 RX ADMIN — POTASSIUM CHLORIDE 10 MEQ: 7.46 INJECTION, SOLUTION INTRAVENOUS at 11:01

## 2025-01-21 RX ADMIN — FOLIC ACID 1 MG: 5 INJECTION, SOLUTION INTRAMUSCULAR; INTRAVENOUS; SUBCUTANEOUS at 05:01

## 2025-01-21 RX ADMIN — MIDAZOLAM 2 MG: 1 INJECTION INTRAMUSCULAR; INTRAVENOUS at 03:01

## 2025-01-21 RX ADMIN — OLANZAPINE 5 MG: 5 TABLET, ORALLY DISINTEGRATING ORAL at 09:01

## 2025-01-21 RX ADMIN — CEFAZOLIN 2 G: 2 INJECTION, POWDER, FOR SOLUTION INTRAMUSCULAR; INTRAVENOUS at 12:01

## 2025-01-21 RX ADMIN — POTASSIUM CHLORIDE 10 MEQ: 7.46 INJECTION, SOLUTION INTRAVENOUS at 10:01

## 2025-01-21 RX ADMIN — FENTANYL CITRATE 100 MCG: 50 INJECTION, SOLUTION INTRAMUSCULAR; INTRAVENOUS at 03:01

## 2025-01-21 RX ADMIN — HYDROCORTISONE SODIUM SUCCINATE 50 MG: 100 INJECTION, POWDER, FOR SOLUTION INTRAMUSCULAR; INTRAVENOUS at 09:01

## 2025-01-21 RX ADMIN — BENZTROPINE MESYLATE 1 MG: 1 TABLET ORAL at 09:01

## 2025-01-21 RX ADMIN — CEFAZOLIN 2 G: 2 INJECTION, POWDER, FOR SOLUTION INTRAMUSCULAR; INTRAVENOUS at 04:01

## 2025-01-21 RX ADMIN — FENTANYL CITRATE 50 MCG: 50 INJECTION, SOLUTION INTRAMUSCULAR; INTRAVENOUS at 03:01

## 2025-01-21 RX ADMIN — PANTOPRAZOLE SODIUM 40 MG: 40 INJECTION, POWDER, FOR SOLUTION INTRAVENOUS at 09:01

## 2025-01-21 RX ADMIN — LEVETIRACETAM 500 MG: 100 INJECTION INTRAVENOUS at 09:01

## 2025-01-21 RX ADMIN — POTASSIUM BICARBONATE 40 MEQ: 391 TABLET, EFFERVESCENT ORAL at 06:01

## 2025-01-21 RX ADMIN — MAGNESIUM SULFATE HEPTAHYDRATE 2 G: 40 INJECTION, SOLUTION INTRAVENOUS at 04:01

## 2025-01-21 RX ADMIN — CEFAZOLIN 2 G: 2 INJECTION, POWDER, FOR SOLUTION INTRAMUSCULAR; INTRAVENOUS at 09:01

## 2025-01-21 RX ADMIN — LEVALBUTEROL HYDROCHLORIDE 0.31 MG: 0.63 SOLUTION RESPIRATORY (INHALATION) at 08:01

## 2025-01-21 RX ADMIN — POTASSIUM CHLORIDE 10 MEQ: 7.46 INJECTION, SOLUTION INTRAVENOUS at 12:01

## 2025-01-21 NOTE — PT/OT/SLP EVAL
Speech Language Pathology Evaluation  Bedside Swallow    Patient Name:  Tresa Henry   MRN:  [id number]  Admitting Diagnosis: Encephalopathy acute    Recommendations:                 General Recommendations:  Dysphagia therapy  Diet recommendations:  NPO, NPO   Aspiration Precautions:   Continue with short term means of alternative hydration and nutrition     Pleasure feedings of ice chips sparingly (3-5 q2) for swallow stim under supervision- pt is NOT safe for initiation of pudding/applesauce for pleasure at this time  Meds via alternative routes  General Precautions: Standard, droplet, aspiration, fall, NPO  Communication strategies:  none    Assessment:     Tresa Henry is a 64 y.o. female with an SLP diagnosis of Dysphagia. She presents with ongoing evidence of overt signs of airway compromise with low level PO intake as well as persistent recommendations for NPO status since repeat SLP assessment following intubation at previous facility dated 1/17. Additionally, pt presented with tremulous vocal quality in addition to evidence of tremor activity in BUE, possibly related to history of psych meds vs encephalopathy vs other etiology.     At this time, pt remains unsafe for initiation of an oral diet. Continue with NPO status and short term means of alternative hydration and nutrition. SLP to continue to follow.         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     Bipolar 1 disorder     Depression 1984    [facility] in [place].     Encounter for blood transfusion     Hallucination 2018    [facility].     History of psychiatric hospitalization 1984    Patient stated she has been hosptialiized 20 times.     History of psychiatric hospitalization 2018    3/7/18 [facility]    History of psychiatric hospitalization 2019 2/20/19; 4/9/19; 7/31/19 9/23/19 [facility]     Hx of psychiatric care 2015    Tegretal, Topamax, Thorazin, cogentin, Lithium     Liver disease     Psychiatric problem 1990    Anxiety (generalized anxiety disorder.)    Schizophrenia 1984    [facility] in [place]    Seizures     Substance abuse     Suicide attempt 11/01/1984    Therapy 2000    [facility]    Weakness 03/09/2021    Withdrawal symptoms, drug or narcotic        Past Surgical History:   Procedure Laterality Date    COLON RESECTION      COLONOSCOPY N/A 11/14/2022    Procedure: COLONOSCOPY;  Surgeon: Maggie Arriaza MD;  Location: [facility] ENDO;  Service: Endoscopy;  Laterality: N/A;    COLONOSCOPY N/A 03/05/2024    Procedure: COLONOSCOPY;  Surgeon: Amadeo Crawford MD;  Location: [facility] ENDO;  Service: General;  Laterality: N/A;    ESOPHAGOGASTRODUODENOSCOPY N/A 11/14/2022    Procedure: EGD (ESOPHAGOGASTRODUODENOSCOPY);  Surgeon: Maggie Arriaza MD;  Location: [facility] ENDO;  Service: Endoscopy;  Laterality: N/A;    HYSTERECTOMY         Prior Intubation HX:  1/11-1/14    Modified Barium Swallow: none on file    Chest X-Rays: 1/20/25- Nasogastric tube tip and side hole projects over the expected location of the gastric lumen. There is patchy increased interstitial and parenchymal attenuation bilaterally, appears to have undergone some clearing since the previous exam. Continued follow-up is advised. No pneumothorax or other significant detrimental change since the previous exam.     Prior diet: Pt previously seen by SLP prior to transfer to [facility] with recommendations for minced and moist diet wiht thin liquids; however, following x3 day intubation with extubation 1/14, pt has remained NPO since that time    Occupation/hobbies/homemaking: none stated    Subjective     Spoke with nursing prior to session. Pt found resting in bed upon SLP entry into room. Pt agreeable to participate in all aspects of session.      Patient goals: none stated     Pain/Comfort:  Pain Rating 1:  0/10    Respiratory Status: Nasal cannula, flow 2 L/min    Objective:     Oral Musculature Evaluation  Oral Musculature: general weakness  Secretion Management: adequate  Mucosal Quality: adequate  Mandibular Strength and Mobility: impaired  Oral Labial Strength and Mobility: functional seal  Lingual Strength and Mobility: impaired protrusion, impaired left lateral movement, impaired right lateral movement  Volitional Cough: nonpoductive  Voice Prior to PO Intake: tremulous vocal quality with decreased intensity    Bedside Swallow Eval:   Consistencies Assessed:  Ice chips x4  Puree: clinician-fed via 1/8 tsp bites of pudding x2     Oral Phase:   Decreased closure around utensil  Slow oral transit time though adequate bolus propulsion achieved across all consistencies trialed given slightly increased time     Pharyngeal Phase:   Immediate nonproductive coughing following 1/2 trials of purees  Increased audible upper airway congestion upon completion of final ice chip trial with inconsistent nonproductive throat clearing noted    Compensatory Strategies  None    Treatment: Pt awake throughout session though with some decreased visual attention and impaired sustained attention requiring intermittent prompting to attend to PO tasks. During unstructured conversations, pt with tremulous vocal quality with reported onset of this morning. Flat affect noted. SLP provided education regarding overall impressions, pleasure feedings of ice chips sparingly with direct supervision, overt signs of airway compromise, continuation of NPO status, safe swallow strategies, and ongoing SLP POC. Pt verbalized understanding but would continue to benefit from ongoing education. Whiteboard updated. Spoke with RN regarding impressions and recommendations and nursing verbalized understanding.       Goals:   Multidisciplinary Problems       SLP Goals          Problem: SLP    Goal Priority Disciplines Outcome   SLP Goal     SLP Progressing    Description: Speech Pathology Goals  To be met by 2/4/25    1. Pt will participate in ongoing diagnostic dysphagia therapy                              Plan:     Patient to be seen:  4 x/week   Plan of Care expires:  01/30/25  Plan of Care reviewed with:  patient   SLP Follow-Up:  Yes       Discharge recommendations:  High Intensity Therapy   Barriers to Discharge:  Level of Skilled Assistance Needed      Time Tracking:     SLP Treatment Date:   01/21/25  Speech Start Time:  1010  Speech Stop Time:  1026     Speech Total Time (min):  16 min    Billable Minutes: Treatment Swallowing Dysfunction 8 and Self Care/Home Management Training 8      01/21/2025

## 2025-01-21 NOTE — PLAN OF CARE
Problem: SLP  Goal: SLP Goal  Description: Speech Pathology Goals  To be met by 2/4/25    1. Pt will participate in ongoing diagnostic dysphagia therapy         Outcome: Progressing     Clinical swallow evaluation completed. Recommend NPO status with pleasure feedings of ice chips sparingly when awake/upright/participating. Continue with short term means of alternative mediation, hydration, and nutrition via NG tube. SLP to continue to follow.

## 2025-01-21 NOTE — SUBJECTIVE & OBJECTIVE
Interval History/Significant Events: NAEON. MRI negative. Eeg ordered. Gen neuro consult. Labs show some pancytopenia.     Review of Systems  Objective:     Vital Signs (Most Recent):  Temp: 98.1 °F (36.7 °C) (01/20/25 0901)  Pulse: 97 (01/21/25 0800)  Resp: 15 (01/21/25 0800)  BP: 139/88 (01/21/25 0701)  SpO2: 100 % (01/21/25 0800) Vital Signs (24h Range):  Pulse:  [] 97  Resp:  [15-21] 15  SpO2:  [95 %-100 %] 100 %  BP: (139-193)/(86-99) 139/88   Weight: 64.1 kg (141 lb 5 oz)  Body mass index is 22.13 kg/m².      Intake/Output Summary (Last 24 hours) at 1/21/2025 1004  Last data filed at 1/21/2025 0745  Gross per 24 hour   Intake 1300.19 ml   Output 1250 ml   Net 50.19 ml          Physical Exam       Vents:     Lines/Drains/Airways       Drain  Duration                  NG/OG Tube 01/17/25 1732 Right nostril 3 days         Urethral Catheter 01/20/25 1534 <1 day              Peripheral Intravenous Line  Duration                  Peripheral IV - Single Lumen 01/18/25 1500 20 G No Left;Posterior Hand 2 days         Peripheral IV - Single Lumen 01/19/25 0548 20 G Posterior;Right Hand 2 days                  Significant Labs:    CBC/Anemia Profile:  Recent Labs   Lab 01/20/25  0555 01/21/25  0345   WBC 8.45 3.17*   HGB 9.0* 7.6*   HCT 27.7* 24.3*   * 68*   MCV 91 96   RDW 14.3 13.9        Chemistries:  Recent Labs   Lab 01/19/25  1750 01/20/25  0555 01/21/25  0345   *  --  146*   K 3.8  --  3.0*   *  --  111*   CO2 24  --  26   BUN 52*  --  49*   CREATININE 1.0  --  0.9   CALCIUM 7.5*  --  7.0*   ALBUMIN 2.5*  --  2.1*   PROT 6.8  --  5.7*   BILITOT 0.8  --  0.6   ALKPHOS 47  --  41   ALT 12  --  <5*   AST 31  --  25   MG  --  1.8 1.7   PHOS  --  3.2 4.0       All pertinent labs within the past 24 hours have been reviewed.    Significant Imaging:  I have reviewed all pertinent imaging results/findings within the past 24 hours.

## 2025-01-21 NOTE — PLAN OF CARE
MICU DAILY GOALS     Family/Goals of care/Code Status   Code Status: Full Code    24H Vital Sign Range  Temp:  [97.6 °F (36.4 °C)-98.3 °F (36.8 °C)]   Pulse:  [102-135]   Resp:  [18-40]   BP: (157-193)/()   SpO2:  [84 %-100 %]      Shift Events (include procedures and significant events)   No acute events throughout shift Pt arrived to MICU disoriented to time, place and situation. CT head clear, MRI ordered. CXR unremarkable. Wound care consulted, wounds dressed per orders. VSS.    AWAKE RASS: Goal -    Actual - RASS (Jamison Agitation-Sedation Scale): alert and calm    Restraint necessity: Not necessary   BREATHE SBT: Not intubated    Coordinate A & B, analgesics/sedatives Pain: managed   SAT: Not intubated   Delirium CAM-ICU:     Early(intubated/ Progressive (non-intubated) Mobility MOVE Screen (INTUBATED ONLY): Not intubated    Activity: Activity Management: Arm raise - L1   Feeding/Nutrition Diet order: Diet/Nutrition Received: NPO,     Thrombus DVT prophylaxis:     HOB Elevation Head of Bed (HOB) Positioning: HOB at 30-45 degrees   Ulcer Prophylaxis GI: yes   Glucose control managed     Skin Skin assessment:     Sacrum intact/not altered? No  Heels intact/not altered? No  Surgical wound? No    CHECK ONE!   (no altered skin or altered skin) and sub boxes:  [] No Altered Skin Integrity Present    []Prevention Measures Documented    [x] Altered Skin Integrity Present or Discovered   [x] LDA present in EPIC, daily doc completed              [] LDA added if not in EPIC (describe wound).                    When describing wound, do not stage, use descriptive words only.    [] Wound Image Taken (required on admit,                   transfer/discharge and every Tuesday)    Wound Care Consulted? Yes   Bowel Function diarrhea    Indwelling Catheter Necessity [REMOVED]      Urethral Catheter 01/15/25 1038 Non-latex 16 Fr.-Reason for Continuing Urinary Catheterization: Critically ill in ICU and requiring hourly  monitoring of intake/output       Urethral Catheter 01/20/25 1534-Reason for Continuing Urinary Catheterization: Urinary retention          De-escalation Antibiotics No        VS and assessment per flow sheet, patient progressing towards goals as tolerated, plan of care reviewed with [unfilled] and family, all concerns addressed, will continue to monitor.

## 2025-01-21 NOTE — CONSULTS
"  [facility] - Medical ICU  Adult Nutrition  Consult Note    SUMMARY     Recommendations    1.) If EN is medically warranted, recommend increasing TF of Isosource 1.5 to goal rate of 55ml/hr to provide 1980 kcal, 89g PRO, CHO 222g, and 1026 ml of FF- FWF per MD.     2.) Avoid holding TF for residuals less than 500mL unless associated with other s/s of intolerance such as N/V/D, abd pain/distention.     3.) May provide Chance BID to help aid in wound healing.     4.) Consider to add 500mg of VitC BID to help aid in wound healing.     5.) Consider to add 220mg of zinc sulfate daily, x 30 days to help aid in wound healing.     6.) Continue folic acid and thiamine regimen.     7.) RD to monitor wt, tolerance, skin, labs.     Goals: To meet % of EEN/EPN by next RD f/u   Nutrition Goal Status: new  Communication of RD Recs:  (POC)    Assessment and Plan    Nutrition Problem  Increased nutrient needs (PRO, energy)    Related to (etiology):   Increased demand for protein, energy 2/2 wound healing    Signs and Symptoms (as evidenced by):   Unstageable PI on sacrum     Interventions/Recommendations (treatment strategy):  Collaboration of nutritional care with other providers.    EN  Modulars  Vitamins and minerals    Nutrition Diagnosis Status:   New     Malnutrition Assessment    Pending NFPE    Reason for Assessment    Reason For Assessment: consult  Diagnosis: infection/sepsis (Encephalopathy acute)    General Information Comments: RD consulted for skin wounds. PMHx: substance use, psychosis, bipolar disorder, HCV, colon cancer. RD is on remote coverage, unable to see pt. RD reviewed skin wound on heel and sacrum. Per last wound care note "While sacral area is considered unstageable, it is shallow and likely a superficial stage 3". RD feels nutritional intervention may be beneficial. NGT present and TF of Isosource 1.5 were initiated at trickle rate, tolerating. Per chart review, UBW appears to be 125-130#, x 12 months; " #. Mild edema present. RD to perform NFPE at f/u.     Nutrition Discharge Planning: as per medical course    Nutrition Related Social Determinants of Health: SDOH: Adequate food in home environment     Food Insecurity: No Food Insecurity (1/6/2025)    Hunger Vital Sign     Worried About Running Out of Food in the Last Year: Never true     Ran Out of Food in the Last Year: Never true     Nutrition/Diet History    Spiritual, Cultural Beliefs, Taoism Practices, Values that Affect Care:  (unable to obtain at this time)  Food Allergies: NKFA  Factors Affecting Nutritional Intake: impaired cognitive status/motor control, NPO    Anthropometrics    Weight: 64.1 kg (141 lb 5 oz)  Weight (lb): 141.32 lb  Weight Method: Bed Scale  BMI (Calculated): 22.1    Lab/Procedures/Meds    Pertinent Labs Reviewed: reviewed  Pertinent Labs Comments: Na: 146, K: 3.0, BUN: 49, gluc: 140, PRO: 5.7    Pertinent Medications Reviewed: reviewed  Pertinent Medications Comments: Folic acid, pantoprazole, KCl, thiamine, D5    Estimated/Assessed Needs    Weight Used For Calorie Calculations: 64.1 kg (141 lb 5 oz)  Energy Calorie Requirements (kcal): 1923 kcal  Energy Need Method: Kcal/kg (30 kcal/kg (skin wounds present))    Protein Requirements: 77- 96g (1.2- 1.5g/kg)  Weight Used For Protein Calculations: 64.1 kg (141 lb 5 oz)    Fluid Requirements (mL): as per MD or RDA  Estimated Fluid Requirement Method: RDA Method  RDA Method (mL): 1923    Nutrition Prescription Ordered    Current Diet Order: NPO    Evaluation of Received Nutrient/Fluid Intake    I/O: -500ml since admit  Energy Calories Required: not meeting needs  Protein Required: not meeting needs  Fluid Required:  (as per MD)  Comments: LBM 1/20  Tolerance: tolerating  % Intake of Estimated Energy Needs: 0 - 25 %  % Meal Intake: NPO    Nutrition Risk    Level of Risk/Frequency of Follow-up: moderate - high     Monitor and Evaluation    Food and Nutrient Intake: energy intake,  enteral nutrition intake  Food and Nutrient Adminstration: enteral and parenteral nutrition administration  Physical Activity and Function: nutrition-related ADLs and IADLs  Anthropometric Measurements: weight, weight change, body mass index  Biochemical Data, Medical Tests and Procedures: electrolyte and renal panel, gastrointestinal profile, glucose/endocrine profile, inflammatory profile, lipid profile  Nutrition-Focused Physical Findings: overall appearance, skin     Nutrition Follow-Up    RD Follow-up?: Yes

## 2025-01-21 NOTE — PROGRESS NOTES
[facility] - Medical ICU  Critical Care Medicine  Progress Note    Patient Name: Tresa Henry  MRN: [id number]  Admission Date: 1/20/2025  Hospital Length of Stay: 1 days  Code Status: Full Code  Attending Provider: Rodolfo Groves MD  Primary Care Provider: Bina Holguin NP   Principal Problem: Encephalopathy acute    Subjective:     HPI:  Tresa Henry is 64 y.o. F with Mhx of f substance use, psychosis, bipolar disorder, HCV, colon cancer, and hyperlipidemia presented to [facility] ICU on 01/05 with AMS. EMS was called to scene where pt was found confused, tachycardic and in pain. Concern for potential drug intoxication and/or withdrawal per EMS. Required admission to ICU for the AMS, Mutlifocal PNA and HUSEYIN. Found to have MSSA bacteremia and positive for influenza A; treated with abx and Tamiflu, respectively. Brief step down from the ICU on 01/09 with improved mentation. Hospital course complicated by large R sided pneumothorax s/p chest tube placement, thrombocytopenia, neutropenia, and hyponatremia. Stepped back to ICU on 01/11 for subsequently requiring intubation, central line, and arterial line for concerns of ARDS; extubated on 01/14. Subsequent Bcx NGTD and NGT placed for nutrition. She required BiPAP intermittently for work breathing for a period of time, but has been on stable nasal cannula recently. Of recent, pt unable to her R foot, followed by inability to arms. Despite prolonged admission and care, patient continued to experience AMS.       With persistent encephalopathy of uncertain etiology, referring team is requested transfer to [facility] for continued treatment of acute encephalopathy.     Prior to transfer :  Na 148, K 4.1, chloride 113, CO2 25, BUN 48, creatinine 1, glucose 144, albumin 2.4, AST 29, ALT 11, magnesium 1.8, phosphorus 2.2, white blood cells 5.69, hemoglobin 8.6, hematocrit 27.3, platelets 118  -pH 7.53, pCO2 36, pO2 84  -chest x-ray showed interval  removal of the small bore chest tube from the right hemithorax.  Decrease in the consolidation in lungs bilaterally since the prior examination.    Hospital/ICU Course:  MRI negative, EEG pending. Cont Cefazolin for possible ongoing infection from removed line. Gen neuro consult. IV thaine. Consent for blood and LP obtained from daughter Amber who consents.     Interval History/Significant Events: NAEON. MRI negative. Eeg ordered. Gen neuro consult. Labs show some pancytopenia.     Review of Systems  Objective:     Vital Signs (Most Recent):  Temp: 98.1 °F (36.7 °C) (01/20/25 0901)  Pulse: 97 (01/21/25 0800)  Resp: 15 (01/21/25 0800)  BP: 139/88 (01/21/25 0701)  SpO2: 100 % (01/21/25 0800) Vital Signs (24h Range):  Pulse:  [] 97  Resp:  [15-21] 15  SpO2:  [95 %-100 %] 100 %  BP: (139-193)/(86-99) 139/88   Weight: 64.1 kg (141 lb 5 oz)  Body mass index is 22.13 kg/m².      Intake/Output Summary (Last 24 hours) at 1/21/2025 1004  Last data filed at 1/21/2025 0745  Gross per 24 hour   Intake 1300.19 ml   Output 1250 ml   Net 50.19 ml          Physical Exam       Vents:     Lines/Drains/Airways       Drain  Duration                  NG/OG Tube 01/17/25 1732 Right nostril 3 days         Urethral Catheter 01/20/25 1534 <1 day              Peripheral Intravenous Line  Duration                  Peripheral IV - Single Lumen 01/18/25 1500 20 G No Left;Posterior Hand 2 days         Peripheral IV - Single Lumen 01/19/25 0548 20 G Posterior;Right Hand 2 days                  Significant Labs:    CBC/Anemia Profile:  Recent Labs   Lab 01/20/25  0555 01/21/25  0345   WBC 8.45 3.17*   HGB 9.0* 7.6*   HCT 27.7* 24.3*   * 68*   MCV 91 96   RDW 14.3 13.9        Chemistries:  Recent Labs   Lab 01/19/25  1750 01/20/25  0555 01/21/25  0345   *  --  146*   K 3.8  --  3.0*   *  --  111*   CO2 24  --  26   BUN 52*  --  49*   CREATININE 1.0  --  0.9   CALCIUM 7.5*  --  7.0*   ALBUMIN 2.5*  --  2.1*   PROT 6.8  --   5.7*   BILITOT 0.8  --  0.6   ALKPHOS 47  --  41   ALT 12  --  <5*   AST 31  --  25   MG  --  1.8 1.7   PHOS  --  3.2 4.0       All pertinent labs within the past 24 hours have been reviewed.    Significant Imaging:  I have reviewed all pertinent imaging results/findings within the past 24 hours.    ABG  Recent Labs   Lab 01/19/25  0530   PH 7.530*   PO2 84   PCO2 36   HCO3 30.10*   BE 6.90*     Assessment/Plan:     Neuro  * Encephalopathy acute  Found by EMS with high suspicion for substance use. UDS THC presumed positive. CT head without acute intracranial pathology. HIV/RPR negative. Hx of untreated Hep C. Persistent AMS. Collateral history provided by sister in the chart reports pt with history of worsening dementia however current mentation is not baseline. Prior to admission, sister reports that patient experience cough and fevers.    Likely multifactorial: metabolic secondary to electrolyte disturbances, sepsis and/or substance abuse. Likely compounded by delirium given prolonged critical admission. New neurological deficits present per transfer note.    Recent ABG  Recent Labs   Lab 01/19/25  0530   PH 7.530*   PO2 84   PCO2 36   HCO3 30.10*   BE 6.90*     - neurochecks  - aspiration, delirium and fall precautions  - Neurology consult; appreciate recs      Delirium  Lack of sleep during hospital stay and new neurological deficits reported. C/f Hypoactive delirium. Hx of psychiatric conditions, with sepsis.     - delirium precautions  - early mobility  - avoid over sedation    Psychiatric  Substance abuse  C/f polysubstance abuse. UDS 01/05 presumed positive for THC. Completed Klonopin taper per Psych at OSH for potential benzo withdrawal.    Pulmonary  Secondary spontaneous pneumothorax  S/p chest tube placement and adequate re-expansion on repeat CXR. Etiology of pneumothorax potentially from substance inhalation causing lung damage overtime. CT c/a/p 10/2024 with evidence of aerated lungs; however severe  lung damage with evidence of ephysema and opacification noted on most recent CT.     Influenza A with pneumonia  RIP 01/05 positive for influenza A. Completed Tamiflu.     ID  MSSA bacteremia  Bxc 01/05 MSSA. Was on Vanc and Zosyn; however transitioned to Vanc and Meropenem due to thrombocytopenia per ID.     - on ancef at last hospital, cont for now   - f/u echo  - ID consult once RPR labs come back       Sepsis with acute organ dysfunction  Strep pneumo urine antigen positive   MSSA bacteremia; follow-up Bcx cleared  Influenza A positive  UA negative for infectious etiology    Placed on Vanc and Zosyn --> Cefazolin and Zosyn --> Vanc and Rm (changed per ID due to thrombocytopenia and spiking fevers on 01/11). Completed Tamiflu. ID deferred treatment of the maame dubliniensis respiratory culture.    - f/u TTE   - ID consulted appreciate recs      Oncology  Colon cancer  Pt follows [facility] [place] [facility] heme/onc. She is s/p colon resection in 2022.           Critical Care Daily Checklist:     A: Awake: RASS Goal/Actual Goal:    Actual:     B: Spontaneous Breathing Trial Performed?    C: SAT & SBT Coordinated?  None                       D: Delirium: CAM-ICU    E: Early Mobility Performed? No   F: Feeding Goal:    Status:         Current Diet Order   Procedures    Diet NPO      AS: Analgesia/Sedation none   T: Thromboembolic Prophylaxis Lovenox    H: HOB > 300 Yes   U: Stress Ulcer Prophylaxis (if needed)     G: Glucose Control yes   B: Bowel Function Stool Occurrence: 1   I: Indwelling Catheter (Lines & Short) Necessity Ng tube, PIV, catheter    D: De-escalation of Antimicrobials/Pharmacotherapies Ancef      Plan for the day/ETD Neuro  consult, LP, SD      Code Status:  Family/Goals of Care: Full Code           Critical care was time spent personally by me on the following activities: development of treatment plan with patient or surrogate and bedside caregivers, discussions with consultants, evaluation of patient's response to  treatment, examination of patient, ordering and performing treatments and interventions, ordering and review of laboratory studies, ordering and review of radiographic studies, pulse oximetry, re-evaluation of patient's condition. This critical care time did not overlap with that of any other provider or involve time for any procedures.     Herrera Cabral DO  Critical Care Medicine  [facility]

## 2025-01-21 NOTE — EICU
Intervention Initiated From:  Bedside    Herb intervened regarding:  Documentation and Time-Out    Nurse Notified:  Yes    Doctor Notified: Dr. Groves  Yes    Comments: LP

## 2025-01-21 NOTE — PLAN OF CARE
[facility] Medicine ICU Acceptance Note    Date of Admit: 1/20/2025  Date of Transfer / Stepdown: 1/21/2025  Zana, C/J, L, Onc (IV chemo w/in 1 month), Gyn/Onc, or other special case?: no   ICU team stepping patient down: MICU  ICU team member giving verbal handoff: [facility]  Accepting  team: CAMACHO    Brief History of Present Illness:      Tresa Henry is 64 y.o. F with Mhx of f substance use, psychosis, bipolar disorder, HCV, colon cancer, and hyperlipidemia presented to [facility] ICU on 01/05 with AMS. EMS was called to scene where pt was found confused, tachycardic and in pain. Concern for potential drug intoxication and/or withdrawal per EMS. Required admission to ICU for the AMS, Mutlifocal PNA and HUSEYIN. Found to have MSSA bacteremia and positive for influenza A; treated with abx and Tamiflu, respectively. Brief step down from the ICU on 01/09 with improved mentation. Hospital course complicated by large R sided pneumothorax s/p chest tube placement, thrombocytopenia, neutropenia, and hyponatremia. Stepped back to ICU on 01/11 for subsequently requiring intubation, central line, and arterial line for concerns of ARDS; extubated on 01/14. Subsequent Bcx NGTD and NGT placed for nutrition. She required BiPAP intermittently for work breathing for a period of time, but has been on stable nasal cannula recently. Of recent, pt unable to her R foot, followed by inability to arms. Despite prolonged admission and care, patient continued to experience AMS.         With persistent encephalopathy of uncertain etiology, referring team is requested transfer to [facility] for continued treatment of acute encephalopathy.      Prior to transfer :  Na 148, K 4.1, chloride 113, CO2 25, BUN 48, creatinine 1, glucose 144, albumin 2.4, AST 29, ALT 11, magnesium 1.8, phosphorus 2.2, white blood cells 5.69, hemoglobin 8.6, hematocrit 27.3, platelets 118  -pH 7.53, pCO2 36, pO2 84  -chest x-ray showed interval  removal of the small bore chest tube from the right hemithorax.  Decrease in the consolidation in lungs bilaterally since the prior examination.     Hospital/ICU Course:     MRI negative, EEG pending. Cont Cefazolin for possible ongoing infection from removed line. Gen neuro consult. IV thaine. Consent for blood and LP obtained from daughter Amber who consents.       Consultants and Procedures:     Consultants:  Neurology    Procedures:    N/a    Transfer Information:     Diet:  NPO    Physical Activity:  N/a    To Do / Pending Studies / Follow ups:    Neuro  * Encephalopathy acute  Found by EMS with high suspicion for substance use. UDS THC presumed positive. CT head without acute intracranial pathology. HIV/RPR negative. Hx of untreated Hep C. Persistent AMS. Collateral history provided by sister in the chart reports pt with history of worsening dementia however current mentation is not baseline. Prior to admission, sister reports that patient experience cough and fevers.     Likely multifactorial: metabolic secondary to electrolyte disturbances, sepsis and/or substance abuse. Likely compounded by delirium given prolonged critical admission. New neurological deficits present per transfer note.     Recent ABG      Recent Labs   Lab 01/19/25  0530   PH 7.530*   PO2 84   PCO2 36   HCO3 30.10*   BE 6.90*      - neurochecks  - aspiration, delirium and fall precautions  - Neurology consult; appreciate recs        Delirium  Lack of sleep during hospital stay and new neurological deficits reported. C/f Hypoactive delirium. Hx of psychiatric conditions, with sepsis.      - delirium precautions  - early mobility  - avoid over sedation     Psychiatric  Substance abuse  C/f polysubstance abuse. UDS 01/05 presumed positive for THC. Completed Klonopin taper per Psych at OSH for potential benzo withdrawal.     Pulmonary  Secondary spontaneous pneumothorax  S/p chest tube placement and adequate re-expansion on repeat CXR.  Etiology of pneumothorax potentially from substance inhalation causing lung damage overtime. CT c/a/p 10/2024 with evidence of aerated lungs; however severe lung damage with evidence of ephysema and opacification noted on most recent CT.      Influenza A with pneumonia  RIP 01/05 positive for influenza A. Completed Tamiflu.      ID  MSSA bacteremia  Bxc 01/05 MSSA. Was on Vanc and Zosyn; however transitioned to Vanc and Meropenem due to thrombocytopenia per ID.      - on ancef at last hospital, cont for now   - f/u echo  - ID consult once RPR labs come back         Sepsis with acute organ dysfunction  Strep pneumo urine antigen positive   MSSA bacteremia; follow-up Bcx cleared  Influenza A positive  UA negative for infectious etiology     Placed on Vanc and Zosyn --> Cefazolin and Zosyn --> Vanc and Rm (changed per ID due to thrombocytopenia and spiking fevers on 01/11). Completed Tamiflu. ID deferred treatment of the maame dubliniensis respiratory culture.     - f/u TTE   - ID consulted appreciate recs        Oncology  Colon cancer  Pt follows [facility] heme/onc. She is s/p colon resection in 2022.     Patient has been accepted by Hospital Medicine Team BB, who will assume care of the patient upon arrival to the floor from the ICU. Please contact ICU team with any concerns prior to arrival. Please contact Hospital Medicine at [phone] (please do NOT leave a voicemail) when patient arrives to the floor.    Homar Khan PA-C

## 2025-01-21 NOTE — HOSPITAL COURSE
MRI negative, EEG pending. Cont Cefazolin for possible ongoing infection from removed line. Gen neuro consult. IV thaine. Consent for blood and LP obtained from daughter Amber who consents. LP performed labs sent off. PT/Ot eval.

## 2025-01-21 NOTE — PLAN OF CARE
Recommendations     1.) If EN is medically warranted, recommend increasing TF of Isosource 1.5 to goal rate of 55ml/hr to provide 1980 kcal, 89g PRO, CHO 222g, and 1026 ml of FF- FWF per MD.      2.) Avoid holding TF for residuals less than 500mL unless associated with other s/s of intolerance such as N/V/D, abd pain/distention.      3.) May provide Chance BID to help aid in wound healing.      4.) Consider to add 500mg of VitC BID to help aid in wound healing.      5.) Consider to add 220mg of zinc sulfate daily, x 30 days to help aid in wound healing.      6.) Continue folic acid and thiamine regimen.      7.) RD to monitor wt, tolerance, skin, labs.      Goals: To meet % of EEN/EPN by next RD f/u   Nutrition Goal Status: new  Communication of RD Recs:  (POC)

## 2025-01-22 LAB
ALBUMIN SERPL BCP-MCNC: 2.1 G/DL (ref 3.5–5.2)
ALP SERPL-CCNC: 41 U/L (ref 40–150)
ALT SERPL W/O P-5'-P-CCNC: 6 U/L (ref 10–44)
ANION GAP SERPL CALC-SCNC: 7 MMOL/L (ref 8–16)
AST SERPL-CCNC: 34 U/L (ref 10–40)
BASOPHILS # BLD AUTO: 0 K/UL (ref 0–0.2)
BASOPHILS NFR BLD: 0 % (ref 0–1.9)
BILIRUB SERPL-MCNC: 0.5 MG/DL (ref 0.1–1)
BLD PROD TYP BPU: NORMAL
BLOOD UNIT EXPIRATION DATE: NORMAL
BLOOD UNIT TYPE CODE: 6200
BLOOD UNIT TYPE: NORMAL
BUN SERPL-MCNC: 39 MG/DL (ref 8–23)
CALCIUM SERPL-MCNC: 6.9 MG/DL (ref 8.7–10.5)
CHLORIDE SERPL-SCNC: 110 MMOL/L (ref 95–110)
CO2 SERPL-SCNC: 26 MMOL/L (ref 23–29)
CODING SYSTEM: NORMAL
CREAT SERPL-MCNC: 0.8 MG/DL (ref 0.5–1.4)
CROSSMATCH INTERPRETATION: NORMAL
DIFFERENTIAL METHOD BLD: ABNORMAL
DISPENSE STATUS: NORMAL
EOSINOPHIL # BLD AUTO: 0 K/UL (ref 0–0.5)
EOSINOPHIL NFR BLD: 0 % (ref 0–8)
ERYTHROCYTE [DISTWIDTH] IN BLOOD BY AUTOMATED COUNT: 13.6 % (ref 11.5–14.5)
EST. GFR  (NO RACE VARIABLE): >60 ML/MIN/1.73 M^2
GLUCOSE SERPL-MCNC: 129 MG/DL (ref 70–110)
HCT VFR BLD AUTO: 21.6 % (ref 37–48.5)
HGB BLD-MCNC: 6.9 G/DL (ref 12–16)
IMM GRANULOCYTES # BLD AUTO: 0.01 K/UL (ref 0–0.04)
IMM GRANULOCYTES NFR BLD AUTO: 0.3 % (ref 0–0.5)
LYMPHOCYTES # BLD AUTO: 0.4 K/UL (ref 1–4.8)
LYMPHOCYTES NFR BLD: 11.1 % (ref 18–48)
MAGNESIUM SERPL-MCNC: 1.9 MG/DL (ref 1.6–2.6)
MCH RBC QN AUTO: 29.5 PG (ref 27–31)
MCHC RBC AUTO-ENTMCNC: 31.9 G/DL (ref 32–36)
MCV RBC AUTO: 92 FL (ref 82–98)
MONOCYTES # BLD AUTO: 0.2 K/UL (ref 0.3–1)
MONOCYTES NFR BLD: 5.7 % (ref 4–15)
NEUTROPHILS # BLD AUTO: 2.8 K/UL (ref 1.8–7.7)
NEUTROPHILS NFR BLD: 82.9 % (ref 38–73)
NRBC BLD-RTO: 0 /100 WBC
NUM UNITS TRANS PACKED RBC: NORMAL
PHOSPHATE SERPL-MCNC: 2.9 MG/DL (ref 2.7–4.5)
PLATELET # BLD AUTO: 76 K/UL (ref 150–450)
PMV BLD AUTO: 10.8 FL (ref 9.2–12.9)
POTASSIUM SERPL-SCNC: 3.7 MMOL/L (ref 3.5–5.1)
PROT SERPL-MCNC: 5.4 G/DL (ref 6–8.4)
RBC # BLD AUTO: 2.34 M/UL (ref 4–5.4)
SODIUM SERPL-SCNC: 143 MMOL/L (ref 136–145)
WBC # BLD AUTO: 3.33 K/UL (ref 3.9–12.7)

## 2025-01-22 PROCEDURE — 85025 COMPLETE CBC W/AUTO DIFF WBC: CPT

## 2025-01-22 PROCEDURE — 99900035 HC TECH TIME PER 15 MIN (STAT)

## 2025-01-22 PROCEDURE — 27000221 HC OXYGEN, UP TO 24 HOURS

## 2025-01-22 PROCEDURE — 25000003 PHARM REV CODE 250: Performed by: INTERNAL MEDICINE

## 2025-01-22 PROCEDURE — 83735 ASSAY OF MAGNESIUM: CPT

## 2025-01-22 PROCEDURE — 99291 CRITICAL CARE FIRST HOUR: CPT | Mod: ,,, | Performed by: PSYCHIATRY & NEUROLOGY

## 2025-01-22 PROCEDURE — 86920 COMPATIBILITY TEST SPIN: CPT | Performed by: INTERNAL MEDICINE

## 2025-01-22 PROCEDURE — 63600175 PHARM REV CODE 636 W HCPCS: Mod: JZ,TB

## 2025-01-22 PROCEDURE — 84100 ASSAY OF PHOSPHORUS: CPT

## 2025-01-22 PROCEDURE — 25000003 PHARM REV CODE 250

## 2025-01-22 PROCEDURE — 36430 TRANSFUSION BLD/BLD COMPNT: CPT

## 2025-01-22 PROCEDURE — 11000001 HC ACUTE MED/SURG PRIVATE ROOM

## 2025-01-22 PROCEDURE — 63600175 PHARM REV CODE 636 W HCPCS

## 2025-01-22 PROCEDURE — P9016 RBC LEUKOCYTES REDUCED: HCPCS | Performed by: INTERNAL MEDICINE

## 2025-01-22 PROCEDURE — 80053 COMPREHEN METABOLIC PANEL: CPT

## 2025-01-22 PROCEDURE — 94761 N-INVAS EAR/PLS OXIMETRY MLT: CPT

## 2025-01-22 PROCEDURE — 99233 SBSQ HOSP IP/OBS HIGH 50: CPT | Mod: ,,, | Performed by: INTERNAL MEDICINE

## 2025-01-22 RX ORDER — OLANZAPINE 5 MG/1
5 TABLET, ORALLY DISINTEGRATING ORAL DAILY
Status: DISCONTINUED | OUTPATIENT
Start: 2025-01-23 | End: 2025-01-27

## 2025-01-22 RX ORDER — HYDROMORPHONE HYDROCHLORIDE 1 MG/ML
0.2 INJECTION, SOLUTION INTRAMUSCULAR; INTRAVENOUS; SUBCUTANEOUS ONCE
Status: COMPLETED | OUTPATIENT
Start: 2025-01-22 | End: 2025-01-22

## 2025-01-22 RX ORDER — BENZTROPINE MESYLATE 1 MG/1
1 TABLET ORAL 2 TIMES DAILY
Status: DISCONTINUED | OUTPATIENT
Start: 2025-01-23 | End: 2025-01-29

## 2025-01-22 RX ORDER — IBUPROFEN 200 MG
24 TABLET ORAL
Status: DISCONTINUED | OUTPATIENT
Start: 2025-01-22 | End: 2025-01-27

## 2025-01-22 RX ORDER — HYDROCODONE BITARTRATE AND ACETAMINOPHEN 500; 5 MG/1; MG/1
TABLET ORAL
Status: DISCONTINUED | OUTPATIENT
Start: 2025-01-22 | End: 2025-01-30 | Stop reason: HOSPADM

## 2025-01-22 RX ORDER — IBUPROFEN 200 MG
16 TABLET ORAL
Status: DISCONTINUED | OUTPATIENT
Start: 2025-01-22 | End: 2025-01-27

## 2025-01-22 RX ORDER — ACETAMINOPHEN 325 MG/1
650 TABLET ORAL EVERY 8 HOURS PRN
Status: DISCONTINUED | OUTPATIENT
Start: 2025-01-22 | End: 2025-01-27

## 2025-01-22 RX ADMIN — CEFAZOLIN 2 G: 2 INJECTION, POWDER, FOR SOLUTION INTRAMUSCULAR; INTRAVENOUS at 12:01

## 2025-01-22 RX ADMIN — THIAMINE HYDROCHLORIDE 100 MG: 100 INJECTION, SOLUTION INTRAMUSCULAR; INTRAVENOUS at 09:01

## 2025-01-22 RX ADMIN — LEVETIRACETAM 500 MG: 100 INJECTION INTRAVENOUS at 09:01

## 2025-01-22 RX ADMIN — BENZTROPINE MESYLATE 1 MG: 1 TABLET ORAL at 09:01

## 2025-01-22 RX ADMIN — HYDROMORPHONE HYDROCHLORIDE 0.2 MG: 1 INJECTION, SOLUTION INTRAMUSCULAR; INTRAVENOUS; SUBCUTANEOUS at 02:01

## 2025-01-22 RX ADMIN — CEFAZOLIN 2 G: 2 INJECTION, POWDER, FOR SOLUTION INTRAMUSCULAR; INTRAVENOUS at 04:01

## 2025-01-22 RX ADMIN — HYDROCORTISONE SODIUM SUCCINATE 50 MG: 100 INJECTION, POWDER, FOR SOLUTION INTRAMUSCULAR; INTRAVENOUS at 09:01

## 2025-01-22 RX ADMIN — CEFAZOLIN 2 G: 2 INJECTION, POWDER, FOR SOLUTION INTRAMUSCULAR; INTRAVENOUS at 08:01

## 2025-01-22 RX ADMIN — POTASSIUM BICARBONATE 40 MEQ: 391 TABLET, EFFERVESCENT ORAL at 05:01

## 2025-01-22 RX ADMIN — CHLORHEXIDINE GLUCONATE 0.12% ORAL RINSE 15 ML: 1.2 LIQUID ORAL at 09:01

## 2025-01-22 RX ADMIN — PANTOPRAZOLE SODIUM 40 MG: 40 INJECTION, POWDER, FOR SOLUTION INTRAVENOUS at 09:01

## 2025-01-22 RX ADMIN — OLANZAPINE 5 MG: 5 TABLET, ORALLY DISINTEGRATING ORAL at 09:01

## 2025-01-22 NOTE — SUBJECTIVE & OBJECTIVE
Interval History/Significant Events: NAEON. Stable for stepdown. LP labs pending     Review of Systems  Objective:     Vital Signs (Most Recent):  Temp: 97.7 °F (36.5 °C) (01/22/25 0701)  Pulse: 86 (01/22/25 0701)  Resp: (!) 22 (01/22/25 0701)  BP: (!) 140/81 (01/22/25 0701)  SpO2: 100 % (01/22/25 0701) Vital Signs (24h Range):  Temp:  [97.7 °F (36.5 °C)-98.9 °F (37.2 °C)] 97.7 °F (36.5 °C)  Pulse:  [] 86  Resp:  [14-36] 22  SpO2:  [98 %-100 %] 100 %  BP: ()/(62-84) 140/81   Weight: 64.1 kg (141 lb 5 oz)  Body mass index is 22.13 kg/m².      Intake/Output Summary (Last 24 hours) at 1/22/2025 0925  Last data filed at 1/22/2025 0701  Gross per 24 hour   Intake 2670.33 ml   Output 1350 ml   Net 1320.33 ml          Physical Exam  Constitutional:       Appearance: She is ill-appearing. She is not toxic-appearing or diaphoretic.   HENT:      Head: Normocephalic and atraumatic.   Cardiovascular:      Rate and Rhythm: Normal rate and regular rhythm.      Heart sounds: No murmur heard.  Pulmonary:      Effort: No respiratory distress.      Breath sounds: No wheezing.   Abdominal:      General: There is no distension.      Tenderness: There is no abdominal tenderness.   Musculoskeletal:         General: No swelling.      Right lower leg: No edema.      Left lower leg: No edema.   Neurological:      Mental Status: She is disoriented.          Vents:     Lines/Drains/Airways       Drain  Duration                  NG/OG Tube 01/17/25 1732 Right nostril 4 days         Urethral Catheter 01/20/25 1534 1 day              Peripheral Intravenous Line  Duration                  Peripheral IV - Single Lumen 01/18/25 1500 20 G No Left;Posterior Hand 3 days         Peripheral IV - Single Lumen 01/21/25 1200 20 G 1 1/4 in Anterior;Left Upper Arm <1 day                  Significant Labs:    CBC/Anemia Profile:  Recent Labs   Lab 01/21/25  0345 01/22/25  0353   WBC 3.17* 3.33*   HGB 7.6* 6.9*   HCT 24.3* 21.6*   PLT 68* 76*   MCV  96 92   RDW 13.9 13.6        Chemistries:  Recent Labs   Lab 01/21/25  0345 01/21/25  1727 01/22/25  0353   * 144 143   K 3.0* 4.0 3.7   * 110 110   CO2 26 26 26   BUN 49* 45* 39*   CREATININE 0.9 0.9 0.8   CALCIUM 7.0* 6.9* 6.9*   ALBUMIN 2.1*  --  2.1*   PROT 5.7*  --  5.4*   BILITOT 0.6  --  0.5   ALKPHOS 41  --  41   ALT <5*  --  6*   AST 25  --  34   MG 1.7  --  1.9   PHOS 4.0  --  2.9       All pertinent labs within the past 24 hours have been reviewed.    Significant Imaging:  I have reviewed all pertinent imaging results/findings within the past 24 hours.

## 2025-01-22 NOTE — PROCEDURES
Tresa Henry is a 64 y.o. female patient.    Temp: 98.1 °F (36.7 °C) (01/20/25 0901)  Pulse: 85 (01/21/25 1701)  Resp: (!) 26 (01/21/25 1701)  BP: 139/80 (01/21/25 1701)  SpO2: 100 % (01/21/25 1701)  Weight: 64.1 kg (141 lb 5 oz) (01/20/25 0949)       Lumbar Puncture    Date/Time: 1/21/2025 3:00 PM  Location procedure was performed: [facility] CRIT[facility]    Performed by: Ra Fisher DO  Authorized by: Ra Fisher DO  Consent Done: Yes  Indications: evaluation for altered mental status and evaluation for infection  Anesthesia: local infiltration    Anesthesia:  Local Anesthetic: lidocaine 1% without epinephrine  Anesthetic total: 5 mL    Patient sedated: yes  Sedatives: fentanyl and midazolam  Preparation: Patient was prepped and draped in the usual sterile fashion.  Lumbar space: L3-L4 interspace  Patient's position: right lateral decubitus  Needle gauge: 20  Needle type: spinal needle - Quincke tip  Number of attempts: 3  Fluid appearance: clear  Tubes of fluid: 4  Total volume: 12 ml  Post-procedure: site cleaned, adhesive bandage applied and pressure dressing applied  Complications: No  Estimated blood loss (mL): 0  Patient tolerance: Patient tolerated the procedure well with no immediate complications  Comments: Dr. Rodolfo Groves present throughout procedure. First 2 attempts done by resident Herrera Cabral.          1/21/2025

## 2025-01-22 NOTE — SUBJECTIVE & OBJECTIVE
Subjective:     Interval History: Improved mentation as expected. Continued absence of any strength in the R foot including areflexia which is concerning but unclear how chronic it may be. Can consider spinal imaging but is likely not urgently needed.    Current Facility-Administered Medications   Medication Dose Route Frequency Provider Last Rate Last Admin    0.9%  NaCl infusion (for blood administration)   Intravenous Q24H PRN Soto Dillon MD        acetaminophen tablet 650 mg  650 mg Oral Q8H PRN Patric Hurt MD        benztropine tablet 1 mg  1 mg Oral BID Patric Hurt MD   1 mg at 01/22/25 0912    ceFAZolin 2 g  2 g Intravenous Q8H Doe CabraltonDO   2 g at 01/22/25 1228    chlorhexidine 0.12 % solution 15 mL  15 mL Mouth/Throat BID Patric Hurt MD   15 mL at 01/22/25 0912    dextrose 50% injection 12.5 g  12.5 g Intravenous PRN Patric Hurt MD        dextrose 50% injection 25 g  25 g Intravenous PRN Patric Hurt MD        folic acid 1 mg in D5W 100 mL IVPB  1 mg Intravenous Daily Rodolfo Groves MD   Stopped at 01/21/25 1825    glucagon (human recombinant) injection 1 mg  1 mg Intramuscular PRN Patric Hurt MD        glucose chewable tablet 16 g  16 g Oral PRN Patric Hurt MD        glucose chewable tablet 24 g  24 g Oral PRN Patric Hurt MD        hydrocortisone sodium succinate injection 50 mg  50 mg Intravenous BID Patric Hurt MD   50 mg at 01/22/25 0912    levETIRAcetam injection 500 mg  500 mg Intravenous Q12H Patric Hurt MD   500 mg at 01/22/25 0912    naloxone 0.4 mg/mL injection 0.02 mg  0.02 mg Intravenous PRN Patric Hurt MD        OLANZapine zydis disintegrating tablet 5 mg  5 mg Oral Daily Patric Hurt MD   5 mg at 01/22/25 0912    pantoprazole injection 40 mg  40 mg Intravenous Daily Patric Hurt MD    40 mg at 01/22/25 0912    sodium chloride 0.9% flush 10 mL  10 mL Intravenous Q12H PRN Patric Hurt MD        thiamine injection 100 mg  100 mg Intravenous Daily Herrera Cabral DO   100 mg at 01/22/25 0912     Review of Systems   Unable to perform ROS: Mental status change     Objective:     Vital Signs (Most Recent):  Temp: 97.7 °F (36.5 °C) (01/22/25 0701)  Pulse: 108 (01/22/25 1601)  Resp: 15 (01/22/25 1601)  BP: 121/77 (01/22/25 1601)  SpO2: 100 % (01/22/25 1601) Vital Signs (24h Range):  Temp:  [97.7 °F (36.5 °C)-98.9 °F (37.2 °C)] 97.7 °F (36.5 °C)  Pulse:  [] 108  Resp:  [15-44] 15  SpO2:  [99 %-100 %] 100 %  BP: (118-144)/(62-85) 121/77     Weight: 64.1 kg (141 lb 5 oz)  Body mass index is 22.13 kg/m².     Physical Exam  Constitutional:       Appearance: She is ill-appearing.   HENT:      Mouth/Throat:      Mouth: Mucous membranes are dry.   Eyes:      Pupils: Pupils are equal, round, and reactive to light.   Cardiovascular:      Rate and Rhythm: Normal rate.   Pulmonary:      Effort: No respiratory distress.   Abdominal:      General: There is no distension.   Skin:     Findings: Bruising present.   Neurological:      Motor: Weakness present.          NEUROLOGICAL EXAMINATION:     CRANIAL NERVES     CN III, IV, VI   Pupils are equal, round, and reactive to light.      Neurological Exam:  MENTAL STATUS  Level of consciousness: alert  Orientation: oriented to person, place, and year, but requires significant prompting  Attention reduced. Concentration reduced.  Speech: reduced content and clarity, both aphasic and dysarthric    CRANIAL NERVES  CN II: Visual fields full to confrontation  CN III, IV, VI: PERRL, EOMI  CN V: Facial sensation intact  CN VII: Facial expression symmetric and full  CN IX, X: Phonation normal  CN XII: Tongue midline with normal movements, no atrophy    MOTOR EXAM  Muscle bulk: reduced generally, but worst in bilateral lower extremity  Muscle tone:  normal  Pronator drift: absent    Strength - Upper Extremities   Arm abduction Elbow flexion Elbow extension Wrist flexion Wrist extension Finger abduction   Right 4-/5 4-/5 4-/5 4-/5 4-/5 4-/5   Left 4-/5 4-/5 4-/5 4-/5 4-/5 4-/5     Strength - Lower Extremities   Hip flexion Knee extension Dorsiflexion Plantarflexion   Right 2/5 0/5 2/5 2/5   Left 5/5 1/5 0/5 0/5       REFLEXES   Biceps Triceps Brachioradialis Patellar   Right +2 +2 +2 +0   Left +2 +2 +2 +0     Toes equivocal bilaterally    SENSORY EXAM  Light touch: intact in all 4 extremities     Significant Labs: All pertinent lab results from the past 24 hours have been reviewed.    Significant Imaging: I have reviewed all pertinent imaging results/findings within the past 24 hours.

## 2025-01-22 NOTE — PROGRESS NOTES
[facility] - Medical ICU  Critical Care Medicine  Progress Note    Patient Name: Tresa Henry  MRN: [id number]  Admission Date: 1/20/2025  Hospital Length of Stay: 2 days  Code Status: Full Code  Attending Provider: Rodolfo Groves MD  Primary Care Provider: Bina Holguin NP   Principal Problem: Encephalopathy acute    Subjective:     HPI:  Tresa Henry is 64 y.o. F with Mhx of f substance use, psychosis, bipolar disorder, HCV, colon cancer, and hyperlipidemia presented to [facility] ICU on 01/05 with AMS. EMS was called to scene where pt was found confused, tachycardic and in pain. Concern for potential drug intoxication and/or withdrawal per EMS. Required admission to ICU for the AMS, Mutlifocal PNA and HUSEYIN. Found to have MSSA bacteremia and positive for influenza A; treated with abx and Tamiflu, respectively. Brief step down from the ICU on 01/09 with improved mentation. Hospital course complicated by large R sided pneumothorax s/p chest tube placement, thrombocytopenia, neutropenia, and hyponatremia. Stepped back to ICU on 01/11 for subsequently requiring intubation, central line, and arterial line for concerns of ARDS; extubated on 01/14. Subsequent Bcx NGTD and NGT placed for nutrition. She required BiPAP intermittently for work breathing for a period of time, but has been on stable nasal cannula recently. Of recent, pt unable to her R foot, followed by inability to arms. Despite prolonged admission and care, patient continued to experience AMS.       With persistent encephalopathy of uncertain etiology, referring team is requested transfer to [facility] for continued treatment of acute encephalopathy.     Prior to transfer :  Na 148, K 4.1, chloride 113, CO2 25, BUN 48, creatinine 1, glucose 144, albumin 2.4, AST 29, ALT 11, magnesium 1.8, phosphorus 2.2, white blood cells 5.69, hemoglobin 8.6, hematocrit 27.3, platelets 118  -pH 7.53, pCO2 36, pO2 84  -chest x-ray showed interval  removal of the small bore chest tube from the right hemithorax.  Decrease in the consolidation in lungs bilaterally since the prior examination.    Hospital/ICU Course:  MRI negative, EEG pending. Cont Cefazolin for possible ongoing infection from removed line. Gen neuro consult. IV thaine. Consent for blood and LP obtained from daughter Amber who consents. LP performed labs sent off. PT/Ot eval.     Interval History/Significant Events: NAEON. Stable for stepdown. LP labs pending     Review of Systems  Objective:     Vital Signs (Most Recent):  Temp: 97.7 °F (36.5 °C) (01/22/25 0701)  Pulse: 86 (01/22/25 0701)  Resp: (!) 22 (01/22/25 0701)  BP: (!) 140/81 (01/22/25 0701)  SpO2: 100 % (01/22/25 0701) Vital Signs (24h Range):  Temp:  [97.7 °F (36.5 °C)-98.9 °F (37.2 °C)] 97.7 °F (36.5 °C)  Pulse:  [] 86  Resp:  [14-36] 22  SpO2:  [98 %-100 %] 100 %  BP: ()/(62-84) 140/81   Weight: 64.1 kg (141 lb 5 oz)  Body mass index is 22.13 kg/m².      Intake/Output Summary (Last 24 hours) at 1/22/2025 0938  Last data filed at 1/22/2025 0701  Gross per 24 hour   Intake 2670.33 ml   Output 1350 ml   Net 1320.33 ml          Physical Exam  Constitutional:       Appearance: She is ill-appearing. She is not toxic-appearing or diaphoretic.   HENT:      Head: Normocephalic and atraumatic.   Cardiovascular:      Rate and Rhythm: Normal rate and regular rhythm.      Heart sounds: No murmur heard.  Pulmonary:      Effort: No respiratory distress.      Breath sounds: No wheezing.   Abdominal:      General: There is no distension.      Tenderness: There is no abdominal tenderness.   Musculoskeletal:         General: No swelling.      Right lower leg: No edema.      Left lower leg: No edema.   Neurological:      Mental Status: She is disoriented.          Vents:     Lines/Drains/Airways       Drain  Duration                  NG/OG Tube 01/17/25 1732 Right nostril 4 days         Urethral Catheter 01/20/25 1534 1 day               Peripheral Intravenous Line  Duration                  Peripheral IV - Single Lumen 01/18/25 1500 20 G No Left;Posterior Hand 3 days         Peripheral IV - Single Lumen 01/21/25 1200 20 G 1 1/4 in Anterior;Left Upper Arm <1 day                  Significant Labs:    CBC/Anemia Profile:  Recent Labs   Lab 01/21/25  0345 01/22/25  0353   WBC 3.17* 3.33*   HGB 7.6* 6.9*   HCT 24.3* 21.6*   PLT 68* 76*   MCV 96 92   RDW 13.9 13.6        Chemistries:  Recent Labs   Lab 01/21/25  0345 01/21/25  1727 01/22/25  0353   * 144 143   K 3.0* 4.0 3.7   * 110 110   CO2 26 26 26   BUN 49* 45* 39*   CREATININE 0.9 0.9 0.8   CALCIUM 7.0* 6.9* 6.9*   ALBUMIN 2.1*  --  2.1*   PROT 5.7*  --  5.4*   BILITOT 0.6  --  0.5   ALKPHOS 41  --  41   ALT <5*  --  6*   AST 25  --  34   MG 1.7  --  1.9   PHOS 4.0  --  2.9       All pertinent labs within the past 24 hours have been reviewed.    Significant Imaging:  I have reviewed all pertinent imaging results/findings within the past 24 hours.    ABG  Recent Labs   Lab 01/19/25  0530   PH 7.530*   PO2 84   PCO2 36   HCO3 30.10*   BE 6.90*     Assessment/Plan:     Neuro  * Encephalopathy acute  Found by EMS with high suspicion for substance use. UDS THC presumed positive. CT head without acute intracranial pathology. HIV/RPR negative. Hx of untreated Hep C. Persistent AMS. Collateral history provided by sister in the chart reports pt with history of worsening dementia however current mentation is not baseline. Prior to admission, sister reports that patient experience cough and fevers.    Likely multifactorial: metabolic secondary to electrolyte disturbances, sepsis and/or substance abuse. Likely compounded by delirium given prolonged critical admission. New neurological deficits present per transfer note.    Recent ABG  Recent Labs   Lab 01/19/25  0530   PH 7.530*   PO2 84   PCO2 36   HCO3 30.10*   BE 6.90*       - neurochecks  - aspiration, delirium and fall precautions  - Neurology  consult; appreciate recs      Delirium  Lack of sleep during hospital stay and new neurological deficits reported. C/f Hypoactive delirium. Hx of psychiatric conditions, with sepsis.     - delirium precautions  - early mobility  - avoid over sedation    Psychiatric  Substance abuse  C/f polysubstance abuse. UDS 01/05 presumed positive for THC. Completed Klonopin taper per Psych at OSH for potential benzo withdrawal.    Pulmonary  Secondary spontaneous pneumothorax  S/p chest tube placement and adequate re-expansion on repeat CXR. Etiology of pneumothorax potentially from substance inhalation causing lung damage overtime. CT c/a/p 10/2024 with evidence of aerated lungs; however severe lung damage with evidence of ephysema and opacification noted on most recent CT.     Influenza A with pneumonia  RIP 01/05 positive for influenza A. Completed Tamiflu.     ID  MSSA bacteremia  Bxc 01/05 MSSA. Was on Vanc and Zosyn; however transitioned to Vanc and Meropenem due to thrombocytopenia per ID.     - on ancef at last hospital, cont for now   - f/u echo  - ID consult once RPR labs come back as well as ancef treatment recs       Sepsis with acute organ dysfunction  Strep pneumo urine antigen positive   MSSA bacteremia; follow-up Bcx cleared  Influenza A positive  UA negative for infectious etiology    Placed on Vanc and Zosyn --> Cefazolin and Zosyn --> Vanc and Rm (changed per ID due to thrombocytopenia and spiking fevers on 01/11). Completed Tamiflu. ID deferred treatment of the maame dubliniensis respiratory culture.    - f/u TTE         Oncology  Colon cancer  Pt follows [facility] heme/onc. She is s/p colon resection in 2022.        Critical Care Daily Checklist:    A: Awake: RASS Goal/Actual Goal:    Actual:     B: Spontaneous Breathing Trial Performed?     C: SAT & SBT Coordinated?                        D: Delirium: CAM-ICU     E: Early Mobility Performed? No   F: Feeding Goal: Goals: .goal  Status: Nutrition Goal Status:  new   Current Diet Order   Procedures    Diet NPO      AS: Analgesia/Sedation    T: Thromboembolic Prophylaxis lovenox   H: HOB > 300 Yes   U: Stress Ulcer Prophylaxis (if needed)    G: Glucose Control    B: Bowel Function Stool Occurrence: 1   I: Indwelling Catheter (Lines & Short) Necessity Ngtube, 2x PIV, urine catheter    D: De-escalation of Antimicrobials/Pharmacotherapies Ancef     Plan for the day/ETD S/D, Pt/OT     Code Status:  Family/Goals of Care: Full Code          Critical care was time spent personally by me on the following activities: development of treatment plan with patient or surrogate and bedside caregivers, discussions with consultants, evaluation of patient's response to treatment, examination of patient, ordering and performing treatments and interventions, ordering and review of laboratory studies, ordering and review of radiographic studies, pulse oximetry, re-evaluation of patient's condition. This critical care time did not overlap with that of any other provider or involve time for any procedures.     Herrera Cabral DO  Critical Care Medicine  [facility] - Medical ICU

## 2025-01-22 NOTE — HOSPITAL COURSE
01/22/2025 Improved mentation as expected. Continued absence of any strength in the R foot including areflexia which is concerning but unclear how chronic it may be. Can consider spinal imaging but is likely not urgently needed.  01/23/2025 Further improved mentation. Noted difficulty with breathing, communicated with the primary team. Patient may requires respiratory therapy.

## 2025-01-22 NOTE — ASSESSMENT & PLAN NOTE
64 y.o. F with significant comorbidities, including HCV, psychosis, and colon cancer, presents with acute AMS on a background of MSSA bacteremia, influenza A, multifocal PNA, HUSEYIN, and ARDS requiring ICU-level care. She has had a protracted hospital course involving intubation, extubation, and persistent complications such as pneumothorax, thrombocytopenia, and azotemia. While her AMS has shown slight improvement, she remains below her baseline, with negative MRI findings and an EEG pending. Proximal weakness has developed, starting with the R foot and progressing to her arms, raising concern for a possible neurological or metabolic etiology vs deconditioning.    The persistent AMS in this patient with a history of dementia and low cognitive reserve is likely multifactorial, involving metabolic derangements (azotemia, hypernatremia), infection-related sequelae (prior MSSA bacteremia, influenza A), possible neurotoxicity from meropenem (discontinued) that can take time to resolve even following the resolution of the inciting factor. Chronic cognitive changes with questionable syphilis status.    1/22/2025 Improved mentation as expected. Continued absence of any strength in the R foot including areflexia which is concerning but likely localized peripheral nerve compression; unclear how chronic it may be. Can consider spinal imaging but is likely not urgently needed.    Recommendations  -- Agree with EEG  -- Continued correction of metabolic parameters, including azotemia, hyperammonia, hypocalcemia and hypernatremia (now resolved)  -- Address past reactive RPR & positive FTA-Abs that does not appear to have been treated  -- Delirium precautions  -- agree with high dose IV thiamine for 5 days, followed by 100mg po  -- Neurology will continue to follow, please call with questions or concerns

## 2025-01-22 NOTE — ASSESSMENT & PLAN NOTE
Bx 01/05 MSSA. Was on Vanc and Zosyn; however transitioned to Vanc and Meropenem due to thrombocytopenia per ID.     - on ancef at last hospital, cont for now   - f/u echo  - ID consult once RPR labs come back as well as ancef treatment recs

## 2025-01-22 NOTE — SUBJECTIVE & OBJECTIVE
Past Medical History:   Diagnosis Date    [place]iction to drug 1984    [place]    Anxiety 11/1984    [place]     Bipolar 1 disorder     Depression 1984    [facility] in [place].     Encounter for blood transfusion     Hallucination 2018    [facility] Hallucinations.     History of psychiatric hospitalization 1984    Patient stated she has been hosptialiized 20 times.     History of psychiatric hospitalization 2018    3/7/18 [facility]    History of psychiatric hospitalization 2019    2/20/19; 4/9/19; 7/31/19 9/23/19 [facility]    Hx of psychiatric care 2015    Tegretal, Topamax, Thorazin, cogentin, Lithium     Liver disease     Psychiatric problem 1990    Anxiety (generalized anxiety disorder.)    Schizophrenia 1984    [place]    Seizures     Substance abuse     Suicide attempt 11/01/1984    Therapy 2000    [facility]    Weakness 03/09/2021    Withdrawal symptoms, drug or narcotic        Past Surgical History:   Procedure Laterality Date    COLON RESECTION      COLONOSCOPY N/A 11/14/2022    Procedure: COLONOSCOPY;  Surgeon: Maggie Arriaza MD;  Location: [facility];  Service: Endoscopy;  Laterality: N/A;    COLONOSCOPY N/A 03/05/2024    Procedure: COLONOSCOPY;  Surgeon: Amadeo Crawford MD;  Location: [facility];  Service: General;  Laterality: N/A;    ESOPHAGOGASTRODUODENOSCOPY N/A 11/14/2022    Procedure: EGD (ESOPHAGOGASTRODUODENOSCOPY);  Surgeon: Maggie Arriaza MD;  Location: [facility];  Service: Endoscopy;  Laterality: N/A;    HYSTERECTOMY         Review of patient's allergies indicates:   Allergen Reactions    Haldol [haloperidol lactate] Hallucinations    Divalproex Rash    Risperdal [risperidone] Palpitations       No current facility-administered medications on file prior to encounter.     Current Outpatient Medications on File Prior to Encounter   Medication Sig    atorvastatin (LIPITOR) 10 MG tablet TAKE ONE  TABLET BY MOUTH ONCE DAILY FOR CHOLESTEROL    benztropine (COGENTIN) 1 MG tablet Take 1 mg by mouth 2 (two) times daily.    carBAMazepine (TEGRETOL) 200 mg tablet Take 200 mg by mouth 2 (two) times a day.    chlorproMAZINE (THORAZINE) 100 MG tablet Take 200 mg by mouth every evening.     clonazePAM (KLONOPIN) 1 MG tablet Take 1 mg by mouth 2 (two) times daily.    ibuprofen (ADVIL,MOTRIN) 800 MG tablet TAKE ONE TABLET BY MOUTH EVERY 8 HOURS WITH FOOD or milk AS NEEDED FOR PAIN    levETIRAcetam (KEPPRA) 500 MG Tab TAKE ONE TABLET BY MOUTH 2 TIMES A DAY    LINZESS 290 mcg Cap capsule Take 290 mcg by mouth.    lurasidone (LATUDA) 80 mg Tab tablet Take 40 mg by mouth 2 (two) times a day.    prazosin (MINIPRESS) 1 MG Cap Take 1 mg by mouth 2 (two) times daily.     Family History       Problem Relation (Age of Onset)    Liver cancer Brother    No Known Problems Father, Sister, Maternal Aunt    Schizophrenia Mother          Tobacco Use    Smoking status: Every Day     Current packs/day: 0.50     Average packs/day: 0.5 packs/day for 40.1 years (20.0 ttl pk-yrs)     Types: Cigarettes     Start date: 1975     Last attempt to quit: 12/21/2012     Passive exposure: Never    Smokeless tobacco: Never    Tobacco comments:     Pt declined smoking cessation services at this time 12/21/2023, updated smoking status   Substance and Sexual Activity    Alcohol use: No    Drug use: Not Currently     Types: Marijuana, Amphetamines    Sexual activity: Not Currently     Partners: Male     Review of Systems   Unable to perform ROS: Mental status change     Objective:     Vital Signs (Most Recent):  Temp: 98.1 °F (36.7 °C) (01/20/25 0901)  Pulse: 85 (01/21/25 1701)  Resp: (!) 26 (01/21/25 1701)  BP: 139/80 (01/21/25 1701)  SpO2: 100 % (01/21/25 1701) Vital Signs (24h Range):  Pulse:  [] 85  Resp:  [14-36] 26  SpO2:  [98 %-100 %] 100 %  BP: ()/(63-88) 139/80     Weight: 64.1 kg (141 lb 5 oz)  Body mass index is 22.13 kg/m².      Physical Exam  Constitutional:       Appearance: She is ill-appearing.   HENT:      Mouth/Throat:      Mouth: Mucous membranes are dry.   Eyes:      Pupils: Pupils are equal, round, and reactive to light.   Cardiovascular:      Rate and Rhythm: Normal rate.   Pulmonary:      Effort: No respiratory distress.   Abdominal:      General: There is no distension.   Skin:     Findings: Bruising present.   Neurological:      Motor: Weakness present.          NEUROLOGICAL EXAMINATION:     CRANIAL NERVES     CN III, IV, VI   Pupils are equal, round, and reactive to light.      Neurological Exam:  MENTAL STATUS  Level of consciousness: alert  Orientation: oriented to person, place, and year, but requires significant prompting  Attention reduced. Concentration reduced.  Speech: reduced content and clarity, both aphasic and dysarthric    CRANIAL NERVES  CN II: Visual fields full to confrontation  CN III, IV, VI: PERRL, EOMI  CN V: Facial sensation intact  CN VII: Facial expression symmetric and full  CN IX, X: Phonation normal  CN XII: Tongue midline with normal movements, no atrophy    MOTOR EXAM  Muscle bulk: reduced generally, but worst in bilateral lower extremity  Muscle tone: normal  Pronator drift: absent    Strength - Upper Extremities   Arm abduction Elbow flexion Elbow extension Wrist flexion Wrist extension Finger abduction   Right 4-/5 4-/5 4-/5 4-/5 4-/5 4-/5   Left 4-/5 4-/5 4-/5 4-/5 4-/5 4-/5     Strength - Lower Extremities   Hip flexion Knee extension Dorsiflexion Plantarflexion   Right 2/5 0/5 2/5 2/5   Left 5/5 1/5 0/5 0/5       REFLEXES   Biceps Triceps Brachioradialis Patellar   Right +2 +2 +2 +0   Left +2 +2 +2 +0     Toes equivocal bilaterally    SENSORY EXAM  Light touch: intact in all 4 extremities     Significant Labs: All pertinent lab results from the past 24 hours have been reviewed.    Significant Imaging: I have reviewed all pertinent imaging results/findings within the past 24 hours.

## 2025-01-22 NOTE — HPI
Neurology is consulted for acute AMS in the case of Tresa Henry, 64 y.o. F with a history of substance use, psychosis, bipolar disorder, HCV, colon cancer, and HLD. She presented to the ICU on 01/05 following an EMS call where she was found confused, tachycardic, and in pain. Initial concerns included drug intoxication and/or withdrawal. Her hospital course was complicated by MSSA bacteremia, influenza A, multifocal PNA, and HUSEYIN, necessitating ICU admission. She required intubation and central/arterial line placement due to ARDS and was later extubated on 01/14.    Despite treatment, including ABx and Tamiflu, her AMS persisted. Subsequent complications included a R pneumothorax s/p chest tube placement, thrombocytopenia, neutropenia, and hyponatremia. Of note, she has shown some improvement recently, with a slightly more conversant state, though she remains below her cognitive baseline. She is no longer bacteremic, but her azotemia (BUN 49) and hypernatremia (146) persist. MRI brain w/wo was negative, and EEG is pending.

## 2025-01-22 NOTE — CONSULTS
[facility] - Medical ICU  Neurology  Consult Note    Patient Name: Tresa Henry  MRN: [id number]  Admission Date: 1/20/2025  Hospital Length of Stay: 1 days  Code Status: Full Code   Attending Provider: Rodolfo Groves MD   Consulting Provider: Julio Waite MD  Primary Care Physician: Bina Holguin NP  Principal Problem:Encephalopathy acute    Inpatient consult to Neurology  Consult performed by: Julio Waite MD  Consult ordered by: Herrera Cabral DO  Reason for consult: Enceph         Subjective:     Chief Complaint:  Encephalopathy     HPI:   Neurology is consulted for acute AMS in the case of Tresa Henry, 64 y.o. F with a history of substance use, psychosis, bipolar disorder, HCV, colon cancer, and HLD. She presented to the ICU on 01/05 following an EMS call where she was found confused, tachycardic, and in pain. Initial concerns included drug intoxication and/or withdrawal. Her hospital course was complicated by MSSA bacteremia, influenza A, multifocal PNA, and HUSEYIN, necessitating ICU admission. She required intubation and central/arterial line placement due to ARDS and was later extubated on 01/14.    Despite treatment, including ABx and Tamiflu, her AMS persisted. Subsequent complications included a R pneumothorax s/p chest tube placement, thrombocytopenia, neutropenia, and hyponatremia. Of note, she has shown some improvement recently, with a slightly more conversant state, though she remains below her cognitive baseline. She is no longer bacteremic, but her azotemia (BUN 49) and hypernatremia (146) persist. MRI brain w/wo was negative, and EEG is pending.     Past Medical History:   Diagnosis Date    Addiction to drug 1984    [facility]    Anxiety 11/1984    [facility].     Bipolar 1 disorder     Depression 1984    [facility].     Encounter for blood transfusion     Hallucination 2018    [facility].      History of psychiatric hospitalization 1984    Patient stated she has been hosptialiized 20 times.     History of psychiatric hospitalization 2018    3/7/18 [facility]    History of psychiatric hospitalization 2019 2/20/19; 4/9/19; 7/31/19 9/23/19 [facility]    Hx of psychiatric care 2015    Tegretal, Topamax, Thorazin, cogentin, Lithium     Liver disease     Psychiatric problem 1990    Anxiety (generalized anxiety disorder.)    Schizophrenia 1984    [facility] [place]    Seizures     Substance abuse     Suicide attempt 11/01/1984    Therapy 2000    [facility]    Weakness 03/09/2021    Withdrawal symptoms, drug or narcotic        Past Surgical History:   Procedure Laterality Date    COLON RESECTION      COLONOSCOPY N/A 11/14/2022    Procedure: COLONOSCOPY;  Surgeon: Maggie Arriaza MD;  Location: [facility] ENDO;  Service: Endoscopy;  Laterality: N/A;    COLONOSCOPY N/A 03/05/2024    Procedure: COLONOSCOPY;  Surgeon: Amadeo Crawford MD;  Location: [facility] ENDO;  Service: General;  Laterality: N/A;    ESOPHAGOGASTRODUODENOSCOPY N/A 11/14/2022    Procedure: EGD (ESOPHAGOGASTRODUODENOSCOPY);  Surgeon: Maggie Arriaza MD;  Location: [facility];  Service: Endoscopy;  Laterality: N/A;    HYSTERECTOMY         Review of patient's allergies indicates:   Allergen Reactions    Haldol [haloperidol lactate] Hallucinations    Divalproex Rash    Risperdal [risperidone] Palpitations       No current facility-administered medications on file prior to encounter.     Current Outpatient Medications on File Prior to Encounter   Medication Sig    atorvastatin (LIPITOR) 10 MG tablet TAKE ONE TABLET BY MOUTH ONCE DAILY FOR CHOLESTEROL    benztropine (COGENTIN) 1 MG tablet Take 1 mg by mouth 2 (two) times daily.    carBAMazepine (TEGRETOL) 200 mg tablet Take 200 mg by mouth 2 (two) times a day.    chlorproMAZINE (THORAZINE) 100 MG tablet Take 200 mg by mouth every evening.     clonazePAM (KLONOPIN) 1 MG tablet Take 1  mg by mouth 2 (two) times daily.    ibuprofen (ADVIL,MOTRIN) 800 MG tablet TAKE ONE TABLET BY MOUTH EVERY 8 HOURS WITH FOOD or milk AS NEEDED FOR PAIN    levETIRAcetam (KEPPRA) 500 MG Tab TAKE ONE TABLET BY MOUTH 2 TIMES A DAY    LINZESS 290 mcg Cap capsule Take 290 mcg by mouth.    lurasidone (LATUDA) 80 mg Tab tablet Take 40 mg by mouth 2 (two) times a day.    prazosin (MINIPRESS) 1 MG Cap Take 1 mg by mouth 2 (two) times daily.     Family History       Problem Relation (Age of Onset)    Liver cancer Brother    No Known Problems Father, Sister, Maternal Aunt    Schizophrenia Mother          Tobacco Use    Smoking status: Every Day     Current packs/day: 0.50     Average packs/day: 0.5 packs/day for 40.1 years (20.0 ttl pk-yrs)     Types: Cigarettes     Start date: 1975     Last attempt to quit: 12/21/2012     Passive exposure: Never    Smokeless tobacco: Never    Tobacco comments:     Pt declined smoking cessation services at this time 12/21/2023, updated smoking status   Substance and Sexual Activity    Alcohol use: No    Drug use: Not Currently     Types: Marijuana, Amphetamines    Sexual activity: Not Currently     Partners: Male     Review of Systems   Unable to perform ROS: Mental status change     Objective:     Vital Signs (Most Recent):  Temp: 98.1 °F (36.7 °C) (01/20/25 0901)  Pulse: 85 (01/21/25 1701)  Resp: (!) 26 (01/21/25 1701)  BP: 139/80 (01/21/25 1701)  SpO2: 100 % (01/21/25 1701) Vital Signs (24h Range):  Pulse:  [] 85  Resp:  [14-36] 26  SpO2:  [98 %-100 %] 100 %  BP: ()/(63-88) 139/80     Weight: 64.1 kg (141 lb 5 oz)  Body mass index is 22.13 kg/m².     Physical Exam  Constitutional:       Appearance: She is ill-appearing.   HENT:      Mouth/Throat:      Mouth: Mucous membranes are dry.   Eyes:      Pupils: Pupils are equal, round, and reactive to light.   Cardiovascular:      Rate and Rhythm: Normal rate.   Pulmonary:      Effort: No respiratory distress.   Abdominal:      General:  There is no distension.   Skin:     Findings: Bruising present.   Neurological:      Motor: Weakness present.          NEUROLOGICAL EXAMINATION:     CRANIAL NERVES     CN III, IV, VI   Pupils are equal, round, and reactive to light.      Neurological Exam:  MENTAL STATUS  Level of consciousness: alert  Orientation: oriented to person, place, and year, but requires significant prompting  Attention reduced. Concentration reduced.  Speech: reduced content and clarity, both aphasic and dysarthric    CRANIAL NERVES  CN II: Visual fields full to confrontation  CN III, IV, VI: PERRL, EOMI  CN V: Facial sensation intact  CN VII: Facial expression symmetric and full  CN IX, X: Phonation normal  CN XII: Tongue midline with normal movements, no atrophy    MOTOR EXAM  Muscle bulk: reduced generally, but worst in bilateral lower extremity  Muscle tone: normal  Pronator drift: absent    Strength - Upper Extremities   Arm abduction Elbow flexion Elbow extension Wrist flexion Wrist extension Finger abduction   Right 4-/5 4-/5 4-/5 4-/5 4-/5 4-/5   Left 4-/5 4-/5 4-/5 4-/5 4-/5 4-/5     Strength - Lower Extremities   Hip flexion Knee extension Dorsiflexion Plantarflexion   Right 2/5 0/5 2/5 2/5   Left 5/5 1/5 0/5 0/5       REFLEXES   Biceps Triceps Brachioradialis Patellar   Right +2 +2 +2 +0   Left +2 +2 +2 +0     Toes equivocal bilaterally    SENSORY EXAM  Light touch: intact in all 4 extremities     Significant Labs: All pertinent lab results from the past 24 hours have been reviewed.    Significant Imaging: I have reviewed all pertinent imaging results/findings within the past 24 hours.  Assessment and Plan:     * Encephalopathy acute  64 y.o. F with significant comorbidities, including HCV, psychosis, and colon cancer, presents with acute AMS on a background of MSSA bacteremia, influenza A, multifocal PNA, HUSEYIN, and ARDS requiring ICU-level care. She has had a protracted hospital course involving intubation, extubation, and  persistent complications such as pneumothorax, thrombocytopenia, and azotemia. While her AMS has shown slight improvement, she remains below her baseline, with negative MRI findings and an EEG pending. Proximal weakness has developed, starting with the R foot and progressing to her arms, raising concern for a possible neurological or metabolic etiology vs deconditioning.    The persistent AMS in this patient with a history of dementia and low cognitive reserve is likely multifactorial, involving metabolic derangements (azotemia, hypernatremia), infection-related sequelae (prior MSSA bacteremia, influenza A), possible neurotoxicity from meropenem (discontinued) that can take time to resolve even following the resolution of the inciting factor. Chronic cognitive changes with questionable syphilis status.    Recommendations  -- Agree with EEG  -- Continued correction of metabolic parameters, including azotemia, hyperammonia, hypocalcemia and hypernatremia (now resolved)  -- Address past reactive RPR & positive FTA-Abs that does not appear to have been treated  -- Delirium precautions  -- agree with high dose IV thiamine for 5 days, followed by 100mg po  -- Neurology will continue to follow, please call with questions or concerns        VTE Risk Mitigation (From admission, onward)           Ordered     Place sequential compression device  Until discontinued         01/20/25 0859     IP VTE HIGH RISK PATIENT  Once         01/20/25 0853     Place sequential compression device  Until discontinued         01/20/25 0853                    Thank you for your consult. I will follow-up with patient. Please contact us if you have any additional questions.    Julio Waite MD  Neurology  [facility] - Medical ICU

## 2025-01-22 NOTE — ASSESSMENT & PLAN NOTE
64 y.o. F with significant comorbidities, including HCV, psychosis, and colon cancer, presents with acute AMS on a background of MSSA bacteremia, influenza A, multifocal PNA, HUSEYIN, and ARDS requiring ICU-level care. She has had a protracted hospital course involving intubation, extubation, and persistent complications such as pneumothorax, thrombocytopenia, and azotemia. While her AMS has shown slight improvement, she remains below her baseline, with negative MRI findings and an EEG pending. Proximal weakness has developed, starting with the R foot and progressing to her arms, raising concern for a possible neurological or metabolic etiology vs deconditioning.    The persistent AMS in this patient with a history of dementia and low cognitive reserve is likely multifactorial, involving metabolic derangements (azotemia, hypernatremia), infection-related sequelae (prior MSSA bacteremia, influenza A), possible neurotoxicity from meropenem (discontinued) that can take time to resolve even following the resolution of the inciting factor. Chronic cognitive changes with questionable syphilis status.    Recommendations  -- Agree with EEG  -- Continued correction of metabolic parameters, including azotemia, hyperammonia, hypocalcemia and hypernatremia (now resolved)  -- Address past reactive RPR & positive FTA-Abs that does not appear to have been treated  -- Delirium precautions  -- agree with high dose IV thiamine for 5 days, followed by 100mg po  -- Neurology will continue to follow, please call with questions or concerns

## 2025-01-22 NOTE — PLAN OF CARE
MICU DAILY GOALS     Family/Goals of care/Code Status   Code Status: Full Code    24H Vital Sign Range  Pulse:  []   Resp:  [14-36]   BP: ()/(63-88)   SpO2:  [98 %-100 %]      Shift Events (include procedures and significant events)   No acute events throughout shift SLP consulted, tube feeding still recommended. Neuro assessed pt, noting deterioration of reflexes. LP performed until moderate sedation, labs pending. VSS for stepdown.     AWAKE RASS: Goal -    Actual - RASS (Jamison Agitation-Sedation Scale): alert and calm    Restraint necessity: Not necessary   BREATHE SBT: Not intubated    Coordinate A & B, analgesics/sedatives Pain: managed   SAT: Not intubated   Delirium CAM-ICU:     Early(intubated/ Progressive (non-intubated) Mobility MOVE Screen (INTUBATED ONLY): Not intubated    Activity: Activity Management: Arm raise - L1   Feeding/Nutrition Diet order: Diet/Nutrition Received: tube feeding,     Thrombus DVT prophylaxis: VTE Core Measure: Pharmacological prophylaxis initiated/maintained   HOB Elevation Head of Bed (HOB) Positioning: HOB at 30-45 degrees   Ulcer Prophylaxis GI: yes   Glucose control managed Glycemic Management: blood glucose monitored   Skin Skin assessment:     Sacrum intact/not altered? No  Heels intact/not altered? No  Surgical wound? No    CHECK ONE!   (no altered skin or altered skin) and sub boxes:  [] No Altered Skin Integrity Present    []Prevention Measures Documented    [x] Altered Skin Integrity Present or Discovered   [x] LDA present in EPIC, daily doc completed              [] LDA added if not in EPIC (describe wound).                    When describing wound, do not stage, use descriptive words only.    [] Wound Image Taken (required on admit,                   transfer/discharge and every Tuesday)    Wound Care Consulted? Yes   Bowel Function no issues    Indwelling Catheter Necessity [REMOVED]      Urethral Catheter 01/15/25 1038 Non-latex 16 Fr.-Reason for  Continuing Urinary Catheterization: Critically ill in ICU and requiring hourly monitoring of intake/output       Urethral Catheter 01/20/25 1534-Reason for Continuing Urinary Catheterization: Urinary retention          De-escalation Antibiotics No        VS and assessment per flow sheet, patient progressing towards goals as tolerated, plan of care reviewed with [unfilled] and family, all concerns addressed, will continue to monitor.

## 2025-01-23 PROBLEM — G57.31: Status: ACTIVE | Noted: 2025-01-23

## 2025-01-23 PROBLEM — F17.200 TOBACCO DEPENDENCY: Status: ACTIVE | Noted: 2025-01-23

## 2025-01-23 LAB
ALBUMIN SERPL BCP-MCNC: 2.2 G/DL (ref 3.5–5.2)
ALP SERPL-CCNC: 61 U/L (ref 40–150)
ALT SERPL W/O P-5'-P-CCNC: 7 U/L (ref 10–44)
ANION GAP SERPL CALC-SCNC: 9 MMOL/L (ref 8–16)
AST SERPL-CCNC: 39 U/L (ref 10–40)
BASOPHILS # BLD AUTO: 0 K/UL (ref 0–0.2)
BASOPHILS NFR BLD: 0 % (ref 0–1.9)
BILIRUB SERPL-MCNC: 0.6 MG/DL (ref 0.1–1)
BUN SERPL-MCNC: 44 MG/DL (ref 8–23)
CALCIUM SERPL-MCNC: 7.3 MG/DL (ref 8.7–10.5)
CHLORIDE SERPL-SCNC: 106 MMOL/L (ref 95–110)
CO2 SERPL-SCNC: 25 MMOL/L (ref 23–29)
CREAT SERPL-MCNC: 0.8 MG/DL (ref 0.5–1.4)
DIFFERENTIAL METHOD BLD: ABNORMAL
EOSINOPHIL # BLD AUTO: 0 K/UL (ref 0–0.5)
EOSINOPHIL NFR BLD: 0 % (ref 0–8)
ERYTHROCYTE [DISTWIDTH] IN BLOOD BY AUTOMATED COUNT: 14.6 % (ref 11.5–14.5)
EST. GFR  (NO RACE VARIABLE): >60 ML/MIN/1.73 M^2
GLUCOSE SERPL-MCNC: 184 MG/DL (ref 70–110)
HCT VFR BLD AUTO: 32.1 % (ref 37–48.5)
HGB BLD-MCNC: 10.5 G/DL (ref 12–16)
IMM GRANULOCYTES # BLD AUTO: 0.02 K/UL (ref 0–0.04)
IMM GRANULOCYTES NFR BLD AUTO: 0.4 % (ref 0–0.5)
LYMPHOCYTES # BLD AUTO: 0.4 K/UL (ref 1–4.8)
LYMPHOCYTES NFR BLD: 7.4 % (ref 18–48)
MAGNESIUM SERPL-MCNC: 1.8 MG/DL (ref 1.6–2.6)
MCH RBC QN AUTO: 29.2 PG (ref 27–31)
MCHC RBC AUTO-ENTMCNC: 32.7 G/DL (ref 32–36)
MCV RBC AUTO: 89 FL (ref 82–98)
MONOCYTES # BLD AUTO: 0.3 K/UL (ref 0.3–1)
MONOCYTES NFR BLD: 5.6 % (ref 4–15)
NEUTROPHILS # BLD AUTO: 4.2 K/UL (ref 1.8–7.7)
NEUTROPHILS NFR BLD: 86.6 % (ref 38–73)
NRBC BLD-RTO: 0 /100 WBC
PHOSPHATE SERPL-MCNC: 2.7 MG/DL (ref 2.7–4.5)
PLATELET # BLD AUTO: 88 K/UL (ref 150–450)
PMV BLD AUTO: 11.5 FL (ref 9.2–12.9)
POTASSIUM SERPL-SCNC: 3.7 MMOL/L (ref 3.5–5.1)
PROT SERPL-MCNC: 5.9 G/DL (ref 6–8.4)
RBC # BLD AUTO: 3.6 M/UL (ref 4–5.4)
RPR SER QL: REACTIVE
RPR SER-TITR: ABNORMAL {TITER}
SODIUM SERPL-SCNC: 140 MMOL/L (ref 136–145)
WBC # BLD AUTO: 4.85 K/UL (ref 3.9–12.7)

## 2025-01-23 PROCEDURE — 84100 ASSAY OF PHOSPHORUS: CPT

## 2025-01-23 PROCEDURE — 85025 COMPLETE CBC W/AUTO DIFF WBC: CPT

## 2025-01-23 PROCEDURE — 25000003 PHARM REV CODE 250: Performed by: STUDENT IN AN ORGANIZED HEALTH CARE EDUCATION/TRAINING PROGRAM

## 2025-01-23 PROCEDURE — 80053 COMPREHEN METABOLIC PANEL: CPT

## 2025-01-23 PROCEDURE — 63600175 PHARM REV CODE 636 W HCPCS

## 2025-01-23 PROCEDURE — 83735 ASSAY OF MAGNESIUM: CPT

## 2025-01-23 PROCEDURE — 11000001 HC ACUTE MED/SURG PRIVATE ROOM

## 2025-01-23 PROCEDURE — 36415 COLL VENOUS BLD VENIPUNCTURE: CPT

## 2025-01-23 PROCEDURE — 25000003 PHARM REV CODE 250: Performed by: INTERNAL MEDICINE

## 2025-01-23 PROCEDURE — 99232 SBSQ HOSP IP/OBS MODERATE 35: CPT | Mod: ,,, | Performed by: PSYCHIATRY & NEUROLOGY

## 2025-01-23 PROCEDURE — 25000003 PHARM REV CODE 250

## 2025-01-23 RX ORDER — HYDROXYZINE HYDROCHLORIDE 10 MG/1
10 TABLET, FILM COATED ORAL ONCE
Status: COMPLETED | OUTPATIENT
Start: 2025-01-23 | End: 2025-01-23

## 2025-01-23 RX ORDER — MUPIROCIN 20 MG/G
OINTMENT TOPICAL 2 TIMES DAILY
Status: COMPLETED | OUTPATIENT
Start: 2025-01-23 | End: 2025-01-28

## 2025-01-23 RX ADMIN — LEVETIRACETAM 500 MG: 100 INJECTION INTRAVENOUS at 09:01

## 2025-01-23 RX ADMIN — CEFAZOLIN 2 G: 2 INJECTION, POWDER, FOR SOLUTION INTRAMUSCULAR; INTRAVENOUS at 12:01

## 2025-01-23 RX ADMIN — MUPIROCIN: 20 OINTMENT TOPICAL at 08:01

## 2025-01-23 RX ADMIN — BENZTROPINE MESYLATE 1 MG: 1 TABLET ORAL at 09:01

## 2025-01-23 RX ADMIN — CEFAZOLIN 2 G: 2 INJECTION, POWDER, FOR SOLUTION INTRAMUSCULAR; INTRAVENOUS at 08:01

## 2025-01-23 RX ADMIN — PANTOPRAZOLE SODIUM 40 MG: 40 INJECTION, POWDER, FOR SOLUTION INTRAVENOUS at 10:01

## 2025-01-23 RX ADMIN — HYDROCORTISONE SODIUM SUCCINATE 50 MG: 100 INJECTION, POWDER, FOR SOLUTION INTRAMUSCULAR; INTRAVENOUS at 08:01

## 2025-01-23 RX ADMIN — HYDROXYZINE HYDROCHLORIDE 10 MG: 10 TABLET ORAL at 04:01

## 2025-01-23 RX ADMIN — THIAMINE HYDROCHLORIDE 100 MG: 100 INJECTION, SOLUTION INTRAMUSCULAR; INTRAVENOUS at 10:01

## 2025-01-23 RX ADMIN — HYDROCORTISONE SODIUM SUCCINATE 50 MG: 100 INJECTION, POWDER, FOR SOLUTION INTRAMUSCULAR; INTRAVENOUS at 09:01

## 2025-01-23 RX ADMIN — CHLORHEXIDINE GLUCONATE 0.12% ORAL RINSE 15 ML: 1.2 LIQUID ORAL at 09:01

## 2025-01-23 RX ADMIN — OLANZAPINE 5 MG: 5 TABLET, ORALLY DISINTEGRATING ORAL at 10:01

## 2025-01-23 RX ADMIN — CHLORHEXIDINE GLUCONATE 0.12% ORAL RINSE 15 ML: 1.2 LIQUID ORAL at 08:01

## 2025-01-23 RX ADMIN — LEVETIRACETAM 500 MG: 100 INJECTION INTRAVENOUS at 08:01

## 2025-01-23 RX ADMIN — CEFAZOLIN 2 G: 2 INJECTION, POWDER, FOR SOLUTION INTRAMUSCULAR; INTRAVENOUS at 04:01

## 2025-01-23 RX ADMIN — BENZTROPINE MESYLATE 1 MG: 1 TABLET ORAL at 08:01

## 2025-01-23 NOTE — PLAN OF CARE
Problem: Adult Inpatient Plan of Care  Goal: Plan of Care Review  Outcome: Progressing     Problem: Sepsis/Septic Shock  Goal: Optimal Coping  Outcome: Progressing     Problem: Pneumonia  Goal: Fluid Balance  Outcome: Progressing     Problem: Wound  Goal: Optimal Coping  Outcome: Progressing   New admit to unit. Awake and alert; oriented to person and place. Skin assessment done with observed wounds documented and treated. Waffle mattress placed on bed and heel boots on. Resting quietly at this time.  Oxygen at 1.5 liters per nasal canula for comfort while sleep. Cardiac monitor on.  Pt was tachycardic, tachypnic, anxious, agitated, tearful, and panic state earlier; notified provider due to tachycardia with pt given ordered medication per NG tube. Pt tolerating feedings per NG tube with instructions not to pull at tube. Fall precautions in place.

## 2025-01-23 NOTE — PLAN OF CARE
Problem: Adult Inpatient Plan of Care  Goal: Plan of Care Review  Outcome: Progressing  Goal: Patient-Specific Goal (Individualized)  Outcome: Progressing  Goal: Absence of Hospital-Acquired Illness or Injury  Outcome: Progressing  Goal: Optimal Comfort and Wellbeing  Outcome: Progressing  Goal: Readiness for Transition of Care  Outcome: Progressing     Problem: Sepsis/Septic Shock  Goal: Optimal Coping  Outcome: Progressing  Goal: Absence of Bleeding  Outcome: Progressing  Goal: Blood Glucose Level Within Targeted Range  Outcome: Progressing  Goal: Absence of Infection Signs and Symptoms  Outcome: Progressing  Goal: Optimal Nutrition Intake  Outcome: Progressing     Problem: Acute Kidney Injury/Impairment  Goal: Fluid and Electrolyte Balance  Outcome: Progressing  Goal: Improved Oral Intake  Outcome: Progressing  Goal: Effective Renal Function  Outcome: Progressing     Problem: Pneumonia  Goal: Fluid Balance  Outcome: Progressing  Goal: Resolution of Infection Signs and Symptoms  Outcome: Progressing  Goal: Effective Oxygenation and Ventilation  Outcome: Progressing     Problem: ARDS (Acute Respiratory Distress Syndrome)  Goal: Effective Oxygenation  Outcome: Progressing     Problem: Wound  Goal: Optimal Coping  Outcome: Progressing  Goal: Optimal Functional Ability  Outcome: Progressing  Goal: Absence of Infection Signs and Symptoms  Outcome: Progressing  Goal: Improved Oral Intake  Outcome: Progressing  Goal: Optimal Pain Control and Function  Outcome: Progressing  Goal: Skin Health and Integrity  Outcome: Progressing  Goal: Optimal Wound Healing  Outcome: Progressing     Problem: Skin Injury Risk Increased  Goal: Skin Health and Integrity  Outcome: Progressing     Problem: Fall Injury Risk  Goal: Absence of Fall and Fall-Related Injury  Outcome: Progressing     Problem: Infection  Goal: Absence of Infection Signs and Symptoms  Outcome: Progressing

## 2025-01-23 NOTE — ASSESSMENT & PLAN NOTE
Strep pneumo urine antigen positive   MSSA bacteremia; follow-up Bcx cleared  Influenza A positive  UA negative for infectious etiology     Placed on Vanc and Zosyn --> Cefazolin and Zosyn --> Vanc and Rm (changed per ID due to thrombocytopenia and spiking fevers on 01/11). Completed Tamiflu. ID deferred treatment of the maame dubliniensis respiratory culture.     - f/u TTE   Antibiotics (72h ago, onward)      Start     Stop Route Frequency Ordered    01/20/25 2000  ceFAZolin 2 g         -- IV Every 8 hours (non-standard times) 01/20/25 1452          ID consulted

## 2025-01-23 NOTE — ASSESSMENT & PLAN NOTE
Patient with acute delirium. There is no specific treatment. Will avoid narcotics/benzos that are known to worsen condition and add PRN antipsychotics to limit behaviors of self harm. Monitor closely.  Fall and delirium precautions.

## 2025-01-23 NOTE — HPI
Tresa Henry is 64 y.o. F with Mhx of f substance use, psychosis, bipolar disorder, HCV, colon cancer, and hyperlipidemia presented to [facility] ICU on 01/05 with AMS. EMS was called to scene where pt was found confused, tachycardic and in pain. Concern for potential drug intoxication and/or withdrawal per EMS. Required admission to ICU for the AMS, Mutlifocal PNA and HUSEYIN. Found to have MSSA bacteremia and positive for influenza A; treated with abx and Tamiflu, respectively. Brief step down from the ICU on 01/09 with improved mentation. Hospital course complicated by large R sided pneumothorax s/p chest tube placement, thrombocytopenia, neutropenia, and hyponatremia. Stepped back to ICU on 01/11 for subsequently requiring intubation, central line, and arterial line for concerns of ARDS; extubated on 01/14. Subsequent Bcx NGTD and NGT placed for nutrition. She required BiPAP intermittently for work breathing for a period of time, but has been on stable nasal cannula recently. Of recent, pt unable to her R foot, followed by inability to arms. Despite prolonged admission and care, patient continued to experience AMS.       With persistent encephalopathy of uncertain etiology, referring team is requested transfer to [facility] for continued treatment of acute encephalopathy.      Prior to transfer :  Na 148, K 4.1, chloride 113, CO2 25, BUN 48, creatinine 1, glucose 144, albumin 2.4, AST 29, ALT 11, magnesium 1.8, phosphorus 2.2, white blood cells 5.69, hemoglobin 8.6, hematocrit 27.3, platelets 118  -pH 7.53, pCO2 36, pO2 84  -chest x-ray showed interval removal of the small bore chest tube from the right hemithorax.  Decrease in the consolidation in lungs bilaterally since the prior examination.

## 2025-01-23 NOTE — ASSESSMENT & PLAN NOTE
Found by EMS with high suspicion for substance use. UDS THC presumed positive. CT head without acute intracranial pathology. HIV/RPR negative. Hx of untreated Hep C. Persistent AMS. Collateral history provided by sister in the chart reports pt with history of worsening dementia however current mentation is not baseline. Prior to admission, sister reports that patient experience cough and fevers. Likely multifactorial: metabolic secondary to electrolyte disturbances, sepsis and/or substance abuse. Likely compounded by delirium given prolonged critical admission.    - neurochecks  - aspiration, delirium and fall precautions  - Neurology consult; appreciate recs  -- Agree with EEG  -- Continued correction of metabolic parameters, including azotemia, hyperammonia, hypocalcemia and hypernatremia (now resolved)  -- Address past reactive RPR & positive FTA-Abs that does not appear to have been treated  -- Delirium precautions  -- agree with high dose IV thiamine for 5 days, followed by 100mg po  -- Neurology will continue to follow, please call with questions or concerns

## 2025-01-23 NOTE — PROGRESS NOTES
[facility] - Medical ICU  Neurology  Progress Note    Patient Name: Tresa Henry  MRN: [id number]  Admission Date: 1/20/2025  Hospital Length of Stay: 2 days  Code Status: Full Code   Attending Provider: Rodolfo Groves MD  Primary Care Physician: Bina Holguin NP   Principal Problem:Encephalopathy acute    HPI:   Neurology is consulted for acute AMS in the case of Tresa Henry, 64 y.o. F with a history of substance use, psychosis, bipolar disorder, HCV, colon cancer, and HLD. She presented to the ICU on 01/05 following an EMS call where she was found confused, tachycardic, and in pain. Initial concerns included drug intoxication and/or withdrawal. Her hospital course was complicated by MSSA bacteremia, influenza A, multifocal PNA, and HUSEYIN, necessitating ICU admission. She required intubation and central/arterial line placement due to ARDS and was later extubated on 01/14.    Despite treatment, including ABx and Tamiflu, her AMS persisted. Subsequent complications included a R pneumothorax s/p chest tube placement, thrombocytopenia, neutropenia, and hyponatremia. Of note, she has shown some improvement recently, with a slightly more conversant state, though she remains below her cognitive baseline. She is no longer bacteremic, but her azotemia (BUN 49) and hypernatremia (146) persist. MRI brain w/wo was negative, and EEG is pending.    Overview/Hospital Course:  01/22/2025 Improved mentation as expected. Continued absence of any strength in the R foot including areflexia which is concerning but unclear how chronic it may be. Can consider spinal imaging but is likely not urgently needed.        Subjective:     Interval History: Improved mentation as expected. Continued absence of any strength in the R foot including areflexia which is concerning but unclear how chronic it may be. Can consider spinal imaging but is likely not urgently needed.    Current Facility-Administered Medications   Medication Dose  Route Frequency Provider Last Rate Last Admin    0.9%  NaCl infusion (for blood administration)   Intravenous Q24H PRN Soto Dillon MD        acetaminophen tablet 650 mg  650 mg Oral Q8H PRN Patric Hurt MD        benztropine tablet 1 mg  1 mg Oral BID Patric Hurt MD   1 mg at 01/22/25 0912    ceFAZolin 2 g  2 g Intravenous Q8H Herrera Cabral, DO   2 g at 01/22/25 1228    chlorhexidine 0.12 % solution 15 mL  15 mL Mouth/Throat BID Patric Hurt MD   15 mL at 01/22/25 0912    dextrose 50% injection 12.5 g  12.5 g Intravenous PRN Patric Hurt MD        dextrose 50% injection 25 g  25 g Intravenous PRN Patric Hurt MD        folic acid 1 mg in D5W 100 mL IVPB  1 mg Intravenous Daily Rodolfo Groves MD   Stopped at 01/21/25 1825    glucagon (human recombinant) injection 1 mg  1 mg Intramuscular PRN Patric Hurt MD        glucose chewable tablet 16 g  16 g Oral PRN Patric Hurt MD        glucose chewable tablet 24 g  24 g Oral PRN Patric Hurt MD        hydrocortisone sodium succinate injection 50 mg  50 mg Intravenous BID Patric Hurt MD   50 mg at 01/22/25 0912    levETIRAcetam injection 500 mg  500 mg Intravenous Q12H Patric Hurt MD   500 mg at 01/22/25 0912    naloxone 0.4 mg/mL injection 0.02 mg  0.02 mg Intravenous PRN Patric Hurt MD        OLANZapine zydis disintegrating tablet 5 mg  5 mg Oral Daily Patric Hurt MD   5 mg at 01/22/25 0912    pantoprazole injection 40 mg  40 mg Intravenous Daily Patric Hurt MD   40 mg at 01/22/25 0912    sodium chloride 0.9% flush 10 mL  10 mL Intravenous Q12H PRN Patric Hurt MD        thiamine injection 100 mg  100 mg Intravenous Daily Herrera Cabral,    100 mg at 01/22/25 0912     Review of Systems   Unable to perform ROS: Mental status change     Objective:     Vital  Signs (Most Recent):  Temp: 97.7 °F (36.5 °C) (01/22/25 0701)  Pulse: 108 (01/22/25 1601)  Resp: 15 (01/22/25 1601)  BP: 121/77 (01/22/25 1601)  SpO2: 100 % (01/22/25 1601) Vital Signs (24h Range):  Temp:  [97.7 °F (36.5 °C)-98.9 °F (37.2 °C)] 97.7 °F (36.5 °C)  Pulse:  [] 108  Resp:  [15-44] 15  SpO2:  [99 %-100 %] 100 %  BP: (118-144)/(62-85) 121/77     Weight: 64.1 kg (141 lb 5 oz)  Body mass index is 22.13 kg/m².     Physical Exam  Constitutional:       Appearance: She is ill-appearing.   HENT:      Mouth/Throat:      Mouth: Mucous membranes are dry.   Eyes:      Pupils: Pupils are equal, round, and reactive to light.   Cardiovascular:      Rate and Rhythm: Normal rate.   Pulmonary:      Effort: No respiratory distress.   Abdominal:      General: There is no distension.   Skin:     Findings: Bruising present.   Neurological:      Motor: Weakness present.          NEUROLOGICAL EXAMINATION:     CRANIAL NERVES     CN III, IV, VI   Pupils are equal, round, and reactive to light.      Neurological Exam:  MENTAL STATUS  Level of consciousness: alert  Orientation: oriented to person, place, and year, but requires significant prompting  Attention reduced. Concentration reduced.  Speech: reduced content and clarity, both aphasic and dysarthric    CRANIAL NERVES  CN II: Visual fields full to confrontation  CN III, IV, VI: PERRL, EOMI  CN V: Facial sensation intact  CN VII: Facial expression symmetric and full  CN IX, X: Phonation normal  CN XII: Tongue midline with normal movements, no atrophy    MOTOR EXAM  Muscle bulk: reduced generally, but worst in bilateral lower extremity  Muscle tone: normal  Pronator drift: absent    Strength - Upper Extremities   Arm abduction Elbow flexion Elbow extension Wrist flexion Wrist extension Finger abduction   Right 4-/5 4-/5 4-/5 4-/5 4-/5 4-/5   Left 4-/5 4-/5 4-/5 4-/5 4-/5 4-/5     Strength - Lower Extremities   Hip flexion Knee extension Dorsiflexion Plantarflexion   Right  2/5 0/5 2/5 2/5   Left 5/5 1/5 0/5 0/5       REFLEXES   Biceps Triceps Brachioradialis Patellar   Right +2 +2 +2 +0   Left +2 +2 +2 +0     Toes equivocal bilaterally    SENSORY EXAM  Light touch: intact in all 4 extremities     Significant Labs: All pertinent lab results from the past 24 hours have been reviewed.    Significant Imaging: I have reviewed all pertinent imaging results/findings within the past 24 hours.  Assessment and Plan:     * Encephalopathy acute  64 y.o. F with significant comorbidities, including HCV, psychosis, and colon cancer, presents with acute AMS on a background of MSSA bacteremia, influenza A, multifocal PNA, HUSEYIN, and ARDS requiring ICU-level care. She has had a protracted hospital course involving intubation, extubation, and persistent complications such as pneumothorax, thrombocytopenia, and azotemia. While her AMS has shown slight improvement, she remains below her baseline, with negative MRI findings and an EEG pending. Proximal weakness has developed, starting with the R foot and progressing to her arms, raising concern for a possible neurological or metabolic etiology vs deconditioning.    The persistent AMS in this patient with a history of dementia and low cognitive reserve is likely multifactorial, involving metabolic derangements (azotemia, hypernatremia), infection-related sequelae (prior MSSA bacteremia, influenza A), possible neurotoxicity from meropenem (discontinued) that can take time to resolve even following the resolution of the inciting factor. Chronic cognitive changes with questionable syphilis status.    1/22/2025 Improved mentation as expected. Continued absence of any strength in the R foot including areflexia which is concerning but likely localized peripheral nerve compression; unclear how chronic it may be. Can consider spinal imaging but is likely not urgently needed.    Recommendations  -- Agree with EEG  -- Continued correction of metabolic parameters,  including azotemia, hyperammonia, hypocalcemia and hypernatremia (now resolved)  -- Address past reactive RPR & positive FTA-Abs that does not appear to have been treated  -- Delirium precautions  -- agree with high dose IV thiamine for 5 days, followed by 100mg po  -- Neurology will continue to follow, please call with questions or concerns        VTE Risk Mitigation (From admission, onward)           Ordered     Place sequential compression device  Until discontinued         01/20/25 0859     IP VTE HIGH RISK PATIENT  Once         01/20/25 0853     Place sequential compression device  Until discontinued         01/20/25 0853                    uJlio Waite MD  Neurology  [facility] - Medical ICU

## 2025-01-23 NOTE — ASSESSMENT & PLAN NOTE
Hypernatremia is likely due to Dehydration. The patient's most recent sodium results are listed below.  Recent Labs     01/21/25  1727 01/22/25  0353 01/23/25  0325    143 140     Plan  - Aim to correct the sodium by 8-10mEq in 24 hours.   - Will plan to trend the patient's sodium: Daily  - The patient's hypernatremia is stable

## 2025-01-23 NOTE — HOSPITAL COURSE
"Pt hospital course was complicated by MSSA bacteremia, influenza A, multifocal PNA, and HUSEYIN, necessitating ICU admission. She required intubation and central/arterial line placement due to ARDS and was later extubated on 01/14. Despite treatment, including ABx and Tamiflu, her AMS persisted. Subsequent complications included a R pneumothorax s/p chest tube placement, thrombocytopenia, neutropenia, and hyponatremia. Of note, she has shown some improvement recently, with a slightly more conversant state, though she remains below her cognitive baseline. She is no longer bacteremic, but her azotemia (BUN 49) and hypernatremia (146) persist. MRI brain w/wo was negative, and EEG without epileptiform activity. Neurology will f/u in clinic at [facility]. Mentation notably improved on 1/24, awake and alert though oriented x1 but w/ fluctuations; thus ID began doxycycline for possible neurosyphilis tx (was switched over to continous pencillin with option of reverting back to doxycyline po at time of discharge).  Neurology assessed no reversible causes for her encephalopathy and feels she has delirium on dementia and does not recommend another LP (last LP did not send VDRL but was very unremarkable). Psychiatry seeing her and feels he also she is delirium on dementia Patient has history of memory loss and had work up in 2022. Mentation worsened dramatically after she lost her home November 2025. Family and patient reports chronically poor appetite ("anorexia"). Patient showed limited po intake.  Patient no longer needed higher level subspecialty care at main [facility] and was transferred back to original hospital.       "

## 2025-01-23 NOTE — NURSING
Patient Transferred to NPU Room 942. Upon arrival to the floor, patient greeted and oriented to room. Complete head to toe assessment completed per protocol. Pt with NG tube present and patent. VSS, see flowsheet for details. Neuro assessment completed. Primary team aware of patient's transfer to floor. All current and transfer orders reviewed/reconciled per protocol. All emergency equipment set up in patient's room. Fall precautions initiated per providers orders. 4 Eyes skin assessment performed, see flowsheet for details. Waffle mattress placed on bed. Heel boots on. Reviewed assessment and rounding frequency with patient. Questions were encouraged and addressed. Repositioned patient for comfort with bed locked in lowest position, side rails up x 3, bed alarm set, and call light within reach. Nodded head for understanding. No acute signs of distress noted at this time.

## 2025-01-23 NOTE — PLAN OF CARE
MICU DAILY GOALS     Family/Goals of care/Code Status   Code Status: Full Code    24H Vital Sign Range  Temp:  [97.7 °F (36.5 °C)-98.9 °F (37.2 °C)]   Pulse:  []   Resp:  [15-44]   BP: (118-144)/(62-85)   SpO2:  [99 %-100 %]      Shift Events (include procedures and significant events)   No acute events throughout shift    AWAKE RASS: Goal -    Actual - RASS (Jamison Agitation-Sedation Scale): drowsy    Restraint necessity: Not necessary   BREATHE SBT: Not intubated    Coordinate A & B, analgesics/sedatives Pain: managed   SAT: Not intubated   Delirium CAM-ICU:     Early(intubated/ Progressive (non-intubated) Mobility MOVE Screen (INTUBATED ONLY): Not intubated    Activity: Activity Management: Rolling - L1   Feeding/Nutrition Diet order: Diet/Nutrition Received: tube feeding, Specialty Diet/Nutrition Received: dysphagia mechanically altered   Thrombus DVT prophylaxis: VTE Core Measure: Pharmacological prophylaxis initiated/maintained   HOB Elevation Head of Bed (HOB) Positioning: HOB at 30-45 degrees   Ulcer Prophylaxis GI: yes   Glucose control managed Glycemic Management: blood glucose monitored   Skin Skin assessment:     Sacrum intact/not altered? No  Heels intact/not altered? No  Surgical wound? No    CHECK ONE!   (no altered skin or altered skin) and sub boxes:  [] No Altered Skin Integrity Present    []Prevention Measures Documented    [x] Altered Skin Integrity Present or Discovered   [x] LDA present in EPIC, daily doc completed              [] LDA added if not in EPIC (describe wound).                    When describing wound, do not stage, use descriptive words only.    [] Wound Image Taken (required on admit,                   transfer/discharge and every Tuesday)    Wound Care Consulted? Yes   Bowel Function no issues    Indwelling Catheter Necessity [REMOVED]      Urethral Catheter 01/15/25 1038 Non-latex 16 Fr.-Reason for Continuing Urinary Catheterization: Critically ill in ICU and requiring  hourly monitoring of intake/output       Urethral Catheter 01/20/25 1534-Reason for Continuing Urinary Catheterization: Urinary retention          De-escalation Antibiotics No        VS and assessment per flow sheet, patient progressing towards goals as tolerated, plan of care reviewed with [unfilled], all concerns addressed, will continue to monitor.

## 2025-01-23 NOTE — SUBJECTIVE & OBJECTIVE
Interval History: NAEON. Pt seen and evaluated at bedside this am.  Pt is anxious this am. I reassured her of POC and hospital course so far. NGT uncomplicated. Neurology planning for EEG to continue w/u of encephalopathy. Corrected Ca 8.3. Continuing ancef for MSSA bacteremia.    Review of Systems   Unable to perform ROS: Mental status change     Objective:     Vital Signs (Most Recent):  Temp: 98 °F (36.7 °C) (01/23/25 0821)  Pulse: (!) 118 (01/23/25 0821)  Resp: 18 (01/23/25 0821)  BP: 132/84 (01/23/25 0821)  SpO2: 96 % (01/23/25 0821) Vital Signs (24h Range):  Temp:  [97.3 °F (36.3 °C)-98.5 °F (36.9 °C)] 98 °F (36.7 °C)  Pulse:  [] 118  Resp:  [15-44] 18  SpO2:  [93 %-100 %] 96 %  BP: (121-142)/(70-92) 132/84     Weight: 64.1 kg (141 lb 5 oz)  Body mass index is 22.13 kg/m².    Intake/Output Summary (Last 24 hours) at 1/23/2025 1027  Last data filed at 1/23/2025 0720  Gross per 24 hour   Intake 1500.03 ml   Output 1455 ml   Net 45.03 ml         Physical Exam  Constitutional:       General: She is not in acute distress.     Appearance: She is ill-appearing. She is not toxic-appearing.      Comments: NGT   HENT:      Head: Normocephalic and atraumatic.      Nose: No congestion.      Mouth/Throat:      Mouth: Mucous membranes are dry.   Eyes:      Extraocular Movements: Extraocular movements intact.      Pupils: Pupils are equal, round, and reactive to light.   Cardiovascular:      Rate and Rhythm: Tachycardia present.      Heart sounds: Normal heart sounds. No murmur heard.     No friction rub. No gallop.   Pulmonary:      Effort: No respiratory distress.      Breath sounds: No wheezing.   Abdominal:      General: Bowel sounds are normal. There is no distension.      Palpations: Abdomen is soft.      Tenderness: There is no abdominal tenderness. There is no right CVA tenderness or left CVA tenderness.   Musculoskeletal:         General: No swelling, tenderness, deformity or signs of injury. Normal range of  motion.      Cervical back: Normal range of motion. No tenderness.   Skin:     General: Skin is warm and dry.      Findings: Bruising present. No lesion.   Neurological:      Mental Status: She is alert.   Psychiatric:         Mood and Affect: Mood is anxious.             Significant Labs: All pertinent labs within the past 24 hours have been reviewed.    Significant Imaging: I have reviewed all pertinent imaging results/findings within the past 24 hours.

## 2025-01-23 NOTE — PROGRESS NOTES
[facility] Medicine  Progress Note    Patient Name: Tresa Henry  MRN: [id number]  Patient Class: IP- Inpatient   Admission Date: 1/20/2025  Length of Stay: 3 days  Attending Physician: Ana Mitchell MD  Primary Care Provider: Bina Holguin NP        Subjective     Principal Problem:Encephalopathy acute        HPI:  Tresa Henry is 64 y.o. F with Mhx of f substance use, psychosis, bipolar disorder, HCV, colon cancer, and hyperlipidemia presented to [facility] ICU on 01/05 with AMS. EMS was called to scene where pt was found confused, tachycardic and in pain. Concern for potential drug intoxication and/or withdrawal per EMS. Required admission to ICU for the AMS, Mutlifocal PNA and HUSEYIN. Found to have MSSA bacteremia and positive for influenza A; treated with abx and Tamiflu, respectively. Brief step down from the ICU on 01/09 with improved mentation. Hospital course complicated by large R sided pneumothorax s/p chest tube placement, thrombocytopenia, neutropenia, and hyponatremia. Stepped back to ICU on 01/11 for subsequently requiring intubation, central line, and arterial line for concerns of ARDS; extubated on 01/14. Subsequent Bcx NGTD and NGT placed for nutrition. She required BiPAP intermittently for work breathing for a period of time, but has been on stable nasal cannula recently. Of recent, pt unable to her R foot, followed by inability to arms. Despite prolonged admission and care, patient continued to experience AMS.       With persistent encephalopathy of uncertain etiology, referring team is requested transfer to [facility] for continued treatment of acute encephalopathy.      Prior to transfer :  Na 148, K 4.1, chloride 113, CO2 25, BUN 48, creatinine 1, glucose 144, albumin 2.4, AST 29, ALT 11, magnesium 1.8, phosphorus 2.2, white blood cells 5.69, hemoglobin 8.6, hematocrit 27.3, platelets 118  -pH 7.53, pCO2 36, pO2 84  -chest x-ray showed interval removal of the  small bore chest tube from the right hemithorax.  Decrease in the consolidation in lungs bilaterally since the prior examination.    Overview/Hospital Course:  Pt hospital course was complicated by MSSA bacteremia, influenza A, multifocal PNA, and HUSEYIN, necessitating ICU admission. She required intubation and central/arterial line placement due to ARDS and was later extubated on 01/14. Despite treatment, including ABx and Tamiflu, her AMS persisted. Subsequent complications included a R pneumothorax s/p chest tube placement, thrombocytopenia, neutropenia, and hyponatremia. Of note, she has shown some improvement recently, with a slightly more conversant state, though she remains below her cognitive baseline. She is no longer bacteremic, but her azotemia (BUN 49) and hypernatremia (146) persist. MRI brain w/wo was negative, and EEG is pending. Neurology following    Interval History: ANAEON. Pt seen and evaluated at bedside this am.  Pt is anxious this am. I reassured her of POC and hospital course so far. NGT uncomplicated. Neurology planning for EEG to continue w/u of encephalopathy. Corrected Ca 8.3. Continuing ancef for MSSA bacteremia.    Review of Systems   Unable to perform ROS: Mental status change     Objective:     Vital Signs (Most Recent):  Temp: 98 °F (36.7 °C) (01/23/25 0821)  Pulse: (!) 118 (01/23/25 0821)  Resp: 18 (01/23/25 0821)  BP: 132/84 (01/23/25 0821)  SpO2: 96 % (01/23/25 0821) Vital Signs (24h Range):  Temp:  [97.3 °F (36.3 °C)-98.5 °F (36.9 °C)] 98 °F (36.7 °C)  Pulse:  [] 118  Resp:  [15-44] 18  SpO2:  [93 %-100 %] 96 %  BP: (121-142)/(70-92) 132/84     Weight: 64.1 kg (141 lb 5 oz)  Body mass index is 22.13 kg/m².    Intake/Output Summary (Last 24 hours) at 1/23/2025 1027  Last data filed at 1/23/2025 0720  Gross per 24 hour   Intake 1500.03 ml   Output 1455 ml   Net 45.03 ml         Physical Exam  Constitutional:       General: She is not in acute distress.     Appearance: She is  ill-appearing. She is not toxic-appearing.      Comments: NGT   HENT:      Head: Normocephalic and atraumatic.      Nose: No congestion.      Mouth/Throat:      Mouth: Mucous membranes are dry.   Eyes:      Extraocular Movements: Extraocular movements intact.      Pupils: Pupils are equal, round, and reactive to light.   Cardiovascular:      Rate and Rhythm: Tachycardia present.      Heart sounds: Normal heart sounds. No murmur heard.     No friction rub. No gallop.   Pulmonary:      Effort: No respiratory distress.      Breath sounds: No wheezing.   Abdominal:      General: Bowel sounds are normal. There is no distension.      Palpations: Abdomen is soft.      Tenderness: There is no abdominal tenderness. There is no right CVA tenderness or left CVA tenderness.   Musculoskeletal:         General: No swelling, tenderness, deformity or signs of injury. Normal range of motion.      Cervical back: Normal range of motion. No tenderness.   Skin:     General: Skin is warm and dry.      Findings: Bruising present. No lesion.   Neurological:      Mental Status: She is alert.   Psychiatric:         Mood and Affect: Mood is anxious.             Significant Labs: All pertinent labs within the past 24 hours have been reviewed.    Significant Imaging: I have reviewed all pertinent imaging results/findings within the past 24 hours.    Assessment and Plan     * Encephalopathy acute  Found by EMS with high suspicion for substance use. UDS THC presumed positive. CT head without acute intracranial pathology. HIV/RPR negative. Hx of untreated Hep C. Persistent AMS. Collateral history provided by sister in the chart reports pt with history of worsening dementia however current mentation is not baseline. Prior to admission, sister reports that patient experience cough and fevers. Likely multifactorial: metabolic secondary to electrolyte disturbances, sepsis and/or substance abuse. Likely compounded by delirium given prolonged critical  admission.    - neurochecks  - aspiration, delirium and fall precautions  - Neurology consult; appreciate recs  -- Agree with EEG  -- Continued correction of metabolic parameters, including azotemia, hyperammonia, hypocalcemia and hypernatremia (now resolved)  -- Address past reactive RPR & positive FTA-Abs that does not appear to have been treated  -- Delirium precautions  -- agree with high dose IV thiamine for 5 days, followed by 100mg po  -- Neurology will continue to follow, please call with questions or concerns    Sepsis with acute organ dysfunction  Strep pneumo urine antigen positive   MSSA bacteremia; follow-up Bcx cleared  Influenza A positive  UA negative for infectious etiology     Placed on Vanc and Zosyn --> Cefazolin and Zosyn --> Vanc and Rm (changed per ID due to thrombocytopenia and spiking fevers on 01/11). Completed Tamiflu. ID deferred treatment of the maame dubliniensis respiratory culture.     - f/u TTE   Antibiotics (72h ago, onward)      Start     Stop Route Frequency Ordered    01/20/25 2000  ceFAZolin 2 g         -- IV Every 8 hours (non-standard times) 01/20/25 1452          ID consulted    MSSA bacteremia  Bxc 01/05 MSSA. Was on Vanc and Zosyn; however transitioned to Vanc and Meropenem due to thrombocytopenia per ID.      - on ancef at last hospital, cont for now   - f/u echo  - ID consult once RPR labs come back as well as ancef treatment recs     Tobacco dependency  Dangers of cigarette smoking were reviewed with patient in detail. Patient was Counseled for 3-10 minutes. Nicotine replacement options were discussed. Nicotine replacement was discussed- not prescribed per patient's request    Delirium  Patient with acute delirium. There is no specific treatment. Will avoid narcotics/benzos that are known to worsen condition and add PRN antipsychotics to limit behaviors of self harm. Monitor closely.  Fall and delirium precautions.     Hypernatremia  Hypernatremia is likely due to  Dehydration. The patient's most recent sodium results are listed below.  Recent Labs     01/21/25  1727 01/22/25  0353 01/23/25  0325    143 140     Plan  - Aim to correct the sodium by 8-10mEq in 24 hours.   - Will plan to trend the patient's sodium: Daily  - The patient's hypernatremia is stable    Secondary spontaneous pneumothorax  S/p chest tube placement and adequate re-expansion on repeat CXR. Etiology of pneumothorax potentially from substance inhalation causing lung damage overtime. CT c/a/p 10/2024 with evidence of aerated lungs; however severe lung damage with evidence of ephysema and opacification noted on most recent CT.     Influenza A with pneumonia  RIP 01/05 positive for influenza A. Completed Tamiflu.     Substance abuse  C/f polysubstance abuse. UDS 01/05 presumed positive for THC. Completed Klonopin taper per Psych at OSH for potential benzo withdrawal.    Colon cancer  -h/o noted      VTE Risk Mitigation (From admission, onward)           Ordered     Place sequential compression device  Until discontinued         01/20/25 0859     IP VTE HIGH RISK PATIENT  Once         01/20/25 0853     Place sequential compression device  Until discontinued         01/20/25 0853                    Discharge Planning   NICK: 2/5/2025     Code Status: Full Code   Medical Readiness for Discharge Date:                    Please place Justification for DME        Homar Khan PA-C  Department of Hospital Medicine   [facility] ([facility])

## 2025-01-24 ENCOUNTER — DOCUMENTATION ONLY (OUTPATIENT)
Dept: NEUROLOGY | Facility: CLINIC | Age: 65
End: 2025-01-24
Payer: MEDICAID

## 2025-01-24 ENCOUNTER — PATIENT MESSAGE (OUTPATIENT)
Dept: NEUROLOGY | Facility: CLINIC | Age: 65
End: 2025-01-24
Payer: MEDICAID

## 2025-01-24 ENCOUNTER — TELEPHONE (OUTPATIENT)
Dept: NEUROLOGY | Facility: CLINIC | Age: 65
End: 2025-01-24
Payer: MEDICAID

## 2025-01-24 PROBLEM — A53.9 SYPHILIS: Status: ACTIVE | Noted: 2025-01-24

## 2025-01-24 LAB
ALBUMIN SERPL BCP-MCNC: 2.1 G/DL (ref 3.5–5.2)
ALP SERPL-CCNC: 49 U/L (ref 40–150)
ALT SERPL W/O P-5'-P-CCNC: <5 U/L (ref 10–44)
ANION GAP SERPL CALC-SCNC: 13 MMOL/L (ref 8–16)
AST SERPL-CCNC: 33 U/L (ref 10–40)
BASOPHILS # BLD AUTO: 0 K/UL (ref 0–0.2)
BASOPHILS NFR BLD: 0 % (ref 0–1.9)
BILIRUB SERPL-MCNC: 0.5 MG/DL (ref 0.1–1)
BUN SERPL-MCNC: 41 MG/DL (ref 8–23)
CALCIUM SERPL-MCNC: 7.5 MG/DL (ref 8.7–10.5)
CHLORIDE SERPL-SCNC: 111 MMOL/L (ref 95–110)
CO2 SERPL-SCNC: 23 MMOL/L (ref 23–29)
CREAT SERPL-MCNC: 1 MG/DL (ref 0.5–1.4)
DIFFERENTIAL METHOD BLD: ABNORMAL
EOSINOPHIL # BLD AUTO: 0 K/UL (ref 0–0.5)
EOSINOPHIL NFR BLD: 0 % (ref 0–8)
ERYTHROCYTE [DISTWIDTH] IN BLOOD BY AUTOMATED COUNT: 14.6 % (ref 11.5–14.5)
EST. GFR  (NO RACE VARIABLE): >60 ML/MIN/1.73 M^2
GLUCOSE SERPL-MCNC: 118 MG/DL (ref 70–110)
HCT VFR BLD AUTO: 28.8 % (ref 37–48.5)
HCV RNA SERPL NAA+PROBE-LOG IU: 6.23 LOGIU/ML
HCV RNA SERPL QL NAA+PROBE: DETECTED
HCV RNA SPEC NAA+PROBE-ACNC: ABNORMAL IU/ML
HGB BLD-MCNC: 9.1 G/DL (ref 12–16)
IMM GRANULOCYTES # BLD AUTO: 0.02 K/UL (ref 0–0.04)
IMM GRANULOCYTES NFR BLD AUTO: 0.5 % (ref 0–0.5)
LYMPHOCYTES # BLD AUTO: 0.5 K/UL (ref 1–4.8)
LYMPHOCYTES NFR BLD: 12.9 % (ref 18–48)
MAGNESIUM SERPL-MCNC: 1.7 MG/DL (ref 1.6–2.6)
MCH RBC QN AUTO: 28.9 PG (ref 27–31)
MCHC RBC AUTO-ENTMCNC: 31.6 G/DL (ref 32–36)
MCV RBC AUTO: 91 FL (ref 82–98)
MONOCYTES # BLD AUTO: 0.3 K/UL (ref 0.3–1)
MONOCYTES NFR BLD: 6.4 % (ref 4–15)
NEUTROPHILS # BLD AUTO: 3.1 K/UL (ref 1.8–7.7)
NEUTROPHILS NFR BLD: 80.2 % (ref 38–73)
NRBC BLD-RTO: 0 /100 WBC
PHOSPHATE SERPL-MCNC: 2.8 MG/DL (ref 2.7–4.5)
PLATELET # BLD AUTO: 83 K/UL (ref 150–450)
PMV BLD AUTO: 11.1 FL (ref 9.2–12.9)
POTASSIUM SERPL-SCNC: 3.7 MMOL/L (ref 3.5–5.1)
PROT SERPL-MCNC: 5.7 G/DL (ref 6–8.4)
RBC # BLD AUTO: 3.15 M/UL (ref 4–5.4)
SODIUM SERPL-SCNC: 147 MMOL/L (ref 136–145)
WBC # BLD AUTO: 3.89 K/UL (ref 3.9–12.7)

## 2025-01-24 PROCEDURE — 95816 EEG AWAKE AND DROWSY: CPT

## 2025-01-24 PROCEDURE — 25000003 PHARM REV CODE 250: Performed by: INTERNAL MEDICINE

## 2025-01-24 PROCEDURE — 97112 NEUROMUSCULAR REEDUCATION: CPT

## 2025-01-24 PROCEDURE — 85025 COMPLETE CBC W/AUTO DIFF WBC: CPT

## 2025-01-24 PROCEDURE — 83735 ASSAY OF MAGNESIUM: CPT

## 2025-01-24 PROCEDURE — 97166 OT EVAL MOD COMPLEX 45 MIN: CPT

## 2025-01-24 PROCEDURE — 97530 THERAPEUTIC ACTIVITIES: CPT

## 2025-01-24 PROCEDURE — 99223 1ST HOSP IP/OBS HIGH 75: CPT | Mod: ,,, | Performed by: INTERNAL MEDICINE

## 2025-01-24 PROCEDURE — 95816 EEG AWAKE AND DROWSY: CPT | Mod: 26,,, | Performed by: PSYCHIATRY & NEUROLOGY

## 2025-01-24 PROCEDURE — 36415 COLL VENOUS BLD VENIPUNCTURE: CPT

## 2025-01-24 PROCEDURE — 63600175 PHARM REV CODE 636 W HCPCS

## 2025-01-24 PROCEDURE — 97162 PT EVAL MOD COMPLEX 30 MIN: CPT

## 2025-01-24 PROCEDURE — 80053 COMPREHEN METABOLIC PANEL: CPT

## 2025-01-24 PROCEDURE — 84100 ASSAY OF PHOSPHORUS: CPT

## 2025-01-24 PROCEDURE — 11000001 HC ACUTE MED/SURG PRIVATE ROOM

## 2025-01-24 PROCEDURE — 92526 ORAL FUNCTION THERAPY: CPT

## 2025-01-24 PROCEDURE — 25000003 PHARM REV CODE 250

## 2025-01-24 RX ORDER — ENOXAPARIN SODIUM 100 MG/ML
40 INJECTION SUBCUTANEOUS EVERY 24 HOURS
Status: DISCONTINUED | OUTPATIENT
Start: 2025-01-24 | End: 2025-01-30 | Stop reason: HOSPADM

## 2025-01-24 RX ADMIN — BENZTROPINE MESYLATE 1 MG: 1 TABLET ORAL at 09:01

## 2025-01-24 RX ADMIN — FOLIC ACID 1 MG: 5 INJECTION, SOLUTION INTRAMUSCULAR; INTRAVENOUS; SUBCUTANEOUS at 02:01

## 2025-01-24 RX ADMIN — CEFAZOLIN 2 G: 2 INJECTION, POWDER, FOR SOLUTION INTRAMUSCULAR; INTRAVENOUS at 09:01

## 2025-01-24 RX ADMIN — CHLORHEXIDINE GLUCONATE 0.12% ORAL RINSE 15 ML: 1.2 LIQUID ORAL at 09:01

## 2025-01-24 RX ADMIN — LEVETIRACETAM 500 MG: 100 INJECTION INTRAVENOUS at 09:01

## 2025-01-24 RX ADMIN — THIAMINE HYDROCHLORIDE 100 MG: 100 INJECTION, SOLUTION INTRAMUSCULAR; INTRAVENOUS at 09:01

## 2025-01-24 RX ADMIN — CEFAZOLIN 2 G: 2 INJECTION, POWDER, FOR SOLUTION INTRAMUSCULAR; INTRAVENOUS at 12:01

## 2025-01-24 RX ADMIN — OLANZAPINE 5 MG: 5 TABLET, ORALLY DISINTEGRATING ORAL at 09:01

## 2025-01-24 RX ADMIN — HYDROCORTISONE SODIUM SUCCINATE 50 MG: 100 INJECTION, POWDER, FOR SOLUTION INTRAMUSCULAR; INTRAVENOUS at 09:01

## 2025-01-24 RX ADMIN — CEFAZOLIN 2 G: 2 INJECTION, POWDER, FOR SOLUTION INTRAMUSCULAR; INTRAVENOUS at 03:01

## 2025-01-24 RX ADMIN — MUPIROCIN: 20 OINTMENT TOPICAL at 09:01

## 2025-01-24 RX ADMIN — MUPIROCIN: 20 OINTMENT TOPICAL at 10:01

## 2025-01-24 RX ADMIN — ENOXAPARIN SODIUM 40 MG: 40 INJECTION SUBCUTANEOUS at 06:01

## 2025-01-24 RX ADMIN — PANTOPRAZOLE SODIUM 40 MG: 40 INJECTION, POWDER, FOR SOLUTION INTRAVENOUS at 09:01

## 2025-01-24 NOTE — ASSESSMENT & PLAN NOTE
64 y.o. F with significant comorbidities, including HCV, psychosis, and colon cancer, presents with acute AMS on a background of MSSA bacteremia, influenza A, multifocal PNA, HUSEYIN, and ARDS requiring ICU-level care. She has had a protracted hospital course involving intubation, extubation, and persistent complications such as pneumothorax, thrombocytopenia, and azotemia. While her AMS has shown slight improvement, she remains below her baseline, with negative MRI findings and an EEG pending. Proximal weakness has developed, starting with the R foot and progressing to her arms, raising concern for a possible neurological or metabolic etiology vs deconditioning.    The persistent AMS in this patient with a history of dementia and low cognitive reserve is likely multifactorial, involving metabolic derangements (azotemia, hypernatremia), infection-related sequelae (prior MSSA bacteremia, influenza A), possible neurotoxicity from meropenem (discontinued) that can take time to resolve even following the resolution of the inciting factor. Chronic cognitive changes with questionable syphilis status.    01/23/2025 Further improved mentation. Noted difficulty with breathing, communicated with the primary team. Patient may requires respiratory therapy.    Recommendations  -- Follow up at discharge with neurology (for R peroneal paralysis)  -- Continued correction of metabolic parameters, including azotemia, hyperammonia, hypocalcemia and hypernatremia (now resolved)  -- Address past reactive RPR & positive FTA-Abs that does not appear to have been treated  -- Delirium precautions  -- agree with high dose IV thiamine for 5 days, followed by 100mg po  -- Neurology will sign off and peripherally follow with results of EEG, please call with questions or concerns

## 2025-01-24 NOTE — PLAN OF CARE
Problem: Occupational Therapy  Goal: Occupational Therapy Goal  Description: Goals to be met by: 2/24/25     Patient will increase functional independence with ADLs by performing:    UE Dressing with Moderate Assistance.  LE Dressing with Moderate Assistance.  Grooming while EOB with Moderate Assistance.  Toileting from bedside commode with Moderate Assistance for hygiene and clothing management.   Sitting at edge of bed x15 minutes with Moderate Assistance.  Supine to sit with Moderate Assistance.  Stand pivot transfers with Maximum Assistance.  Step transfer with Total Assistance  Toilet transfer to bedside commode with Maximum Assistance.    Outcome: Progressing

## 2025-01-24 NOTE — ASSESSMENT & PLAN NOTE
Bxc 01/05 MSSA. Was on Vanc and Zosyn; however transitioned to Vanc and Meropenem due to thrombocytopenia per ID.      - on ancef at last hospital, cont for now   - f/u echo  - ID consulted, appreciate recs

## 2025-01-24 NOTE — TELEPHONE ENCOUNTER
----- Message from Julio Waite sent at 1/23/2025  7:00 PM CST -----  Regarding: Urgent hospital follow up with Dr. Guillen or resident staffing with him  Hi Ms Grant,    This patient will need hospital follow up with resident clinic on Thursday morning so that the resident can staff with Dr. Guillen.    Thanks,  Julio

## 2025-01-24 NOTE — ASSESSMENT & PLAN NOTE
Hypernatremia is likely due to Dehydration. The patient's most recent sodium results are listed below.  Recent Labs     01/22/25  0353 01/23/25  0325 01/24/25  0337    140 147*       Plan  - Aim to correct the sodium by 8-10mEq in 24 hours.   - Will plan to trend the patient's sodium: Daily  - The patient's hypernatremia is stable  - encourage PO now that patient is more awake. Can give additional IVF if continued hypernatremia despite PO intake

## 2025-01-24 NOTE — PLAN OF CARE
PT Eval complete, appropriate goals created    Problem: Physical Therapy  Goal: Physical Therapy Goal  Description: Goals to be completed by: 2/7/25    Pt will perform sup<>sit transfers w/ moderate assistance  Pt will have sufficient dynamic balance to sit EOB while performing ADLs/therex w/ minimum assistance  Pt will be able to stand up from EOB w/ maximal assistance using LRAD  Pt will be independent w/ HEP therex on BLE w/ good form and ROM     DME Justifications (see above for complete DME recommendations)  Hospital Bed- Patient requires a hospital bed for positioning of the body in ways that are not feasible with an ordinary bed. The patient requires special positioning for pain relief, limited mobility, and/or being unable to independently make changes in body position without the use of a hospital bed. Pillows and wedges will not be adequate for resolving these positional issues.    Outcome: Progressing

## 2025-01-24 NOTE — ASSESSMENT & PLAN NOTE
C/f polysubstance abuse. UDS 01/05 presumed positive for THC. Completed Klonopin taper per Psych at OSH for potential benzo withdrawal. On empiric IV thiamine with planned transition to PO

## 2025-01-24 NOTE — SUBJECTIVE & OBJECTIVE
Interval History: NAEON. Awake and alert and oriented x1. Calm and in no distress. Denies any acute complaints. Reports wanting to eat some food.     Review of Systems   Unable to perform ROS: Mental status change     Objective:     Vital Signs (Most Recent):  Temp: 98.6 °F (37 °C) (01/24/25 0819)  Pulse: 97 (01/24/25 1138)  Resp: 18 (01/24/25 0819)  BP: (!) 155/84 (01/24/25 0819)  SpO2: 100 % (01/24/25 0819) Vital Signs (24h Range):  Temp:  [98.4 °F (36.9 °C)-98.8 °F (37.1 °C)] 98.6 °F (37 °C)  Pulse:  [] 97  Resp:  [18] 18  SpO2:  [98 %-100 %] 100 %  BP: (125-155)/(84-91) 155/84     Weight: 64.1 kg (141 lb 5 oz)  Body mass index is 22.13 kg/m².  No intake or output data in the 24 hours ending 01/24/25 1506        Physical Exam  Constitutional:       General: She is not in acute distress.     Appearance: She is not ill-appearing, toxic-appearing or diaphoretic.   HENT:      Head: Normocephalic and atraumatic.      Nose: No congestion.      Mouth/Throat:      Mouth: Mucous membranes are dry.   Eyes:      Extraocular Movements: Extraocular movements intact.      Pupils: Pupils are equal, round, and reactive to light.   Cardiovascular:      Rate and Rhythm: Normal rate and regular rhythm.      Heart sounds: Normal heart sounds. No murmur heard.     No friction rub. No gallop.   Pulmonary:      Effort: No respiratory distress.      Breath sounds: No wheezing.   Abdominal:      General: Bowel sounds are normal. There is no distension.      Palpations: Abdomen is soft.      Tenderness: There is no abdominal tenderness. There is no right CVA tenderness or left CVA tenderness.   Musculoskeletal:         General: No swelling, tenderness, deformity or signs of injury. Normal range of motion.      Cervical back: Normal range of motion. No tenderness.   Skin:     General: Skin is warm and dry.      Findings: Bruising present. No lesion.   Neurological:      Mental Status: She is alert.      Comments: Oriented to person  "only (states she is [place] and the year is "September")   Psychiatric:         Mood and Affect: Mood normal.             Significant Labs: All pertinent labs within the past 24 hours have been reviewed.    Significant Imaging: I have reviewed all pertinent imaging results/findings within the past 24 hours.  "

## 2025-01-24 NOTE — PT/OT/SLP EVAL
Physical Therapy Co-Evaluation and Treatment    Patient Name: Tresa Henry   MRN: [id number]  Recent Surgery: * No surgery found *      Recommendations:     Discharge Recommendations: Moderate Intensity Therapy    Discharge Equipment Recommendations: hospital bed, to be determined by next level of care   Barriers to discharge: Increased level of assist  Nursing Mobilization: Patient not clear to transfer with RN/PCT.    Assessment:     Tresa Henry is a 64 y.o. female admitted with a medical diagnosis of Encephalopathy acute. She presents with the following impairments/functional limitations: weakness, impaired endurance, impaired self care skills, impaired functional mobility, impaired sensation, impaired balance, impaired cognition, decreased coordination, decreased upper extremity function, decreased safety awareness, decreased lower extremity function, decreased ROM, impaired coordination, impaired fine motor, impaired skin. Pt AO x2 w/ poor command following but difficulty discerning if due to AMS, dec participation, inability to perform actions asked, or dec initiation. Large fluctuations in strength w/ pt reporting inability to move BLE w/ 0/5 on formal strength assessment despite numerous attempts with different wording and cueing provided, but then once asked to lift leg for sock to be donned was able to kick up both legs. In addition to this, pt sometimes able to hold herself up in sitting at EOB w/ CGA but then at other times requiring maximal assistance to maintain sitting balance. Patient currently demonstrates a need for moderate intensity therapy on a daily basis post acute secondary to a decline in functional status due to illness     Rehab Prognosis:  good-fair ; patient would benefit from acute skilled PT services to address these deficits and reach maximum level of function.  Recent Surgery: * No surgery found *      Plan:     During this hospitalization, patient to be seen 4 x/week to  "address the identified rehab impairments via therapeutic activities, therapeutic exercises, neuromuscular re-education and progress toward the following goals:    Plan of Care Expires: 02/07/25    Subjective     Chief Complaint: "a lot" of pain in butt, unable to rate  Patient/Family Comments/Goals: pt frequently asking about lunch/food in general, discussing how she got  on her birthday but she doesn't like her , and how she helps her sister who has marital issues going on (uncertain of accuracy of any of this)  Pain/Comfort:  Pain Rating 1: 0/10  Pain Rating Post-Intervention 1: 0/10    Patients cultural, spiritual, Muslim conflicts given the current situation: no    Social History:  Living Environment: Patient reported to therapists she lives alone in an apartment but per PT eval from previous hospital pt lives w/ sister in a [facility] w/ 5 DOE, uncertain if that history was obtained from pt or family, hx will need to be confirmed.  Prior Level of Function: Prior to admission, patient with undetermined level of function.  Equipment Used at Home: none    DME owned (not currently used): none  Assistance Upon Discharge: unknown    Objective:     Communicated with RN prior to session. Patient found HOB elevated with peripheral IV, telemetry, herr catheter, bed alarm, pressure relief boots upon PT entry to room. Co-evaluation w/ OT 2/2 suspected pt complexity and requirement of assistance from 2 skilled therapists to maximize treatment potential and maintain pt safety     General Precautions: Standard, aspiration, droplet, fall   Orthopedic Precautions:N/A   Braces: N/A  Respiratory Status: Nasal cannula, flow 1.5 L/min    Exams:  Cognitive Exam: Patient is oriented to Person, Place, pt reported it was September 1912 for date and that she is here "for different things" when asked to clarify reports "treatment and stuff, for different things, I don't really know" , follows commands very minimally but " "difficulty discerning if due to confusion, poor participation, or inability to do the things therapists asked as pt frequently just says "no" when commands given  RLE ROM: WFL  RLE Strength:  0/5 grossly on formal exam w/ pt reporting "I am trying, but I can't do it" to all assessments but then when asked to lift foot for therapist to don sock, able to suddenly kick leg w/ 2+/5 knee extension despite 0/5 previously  LLE ROM: WFL  LLE Strength:  0/5 grossly on formal exam w/ pt reporting "I am trying, but I can't do it" to all assessments but then when asked to lift foot for therapist to don sock, able to suddenly kick leg w/ 2+/5 knee extension despite 0/5 previously; also demonstrates ability to wiggle toes and once spontaneously performed heel slide backwards while sitting EOB demonstrating at least 2-/5 knee flexion but couldn't activate muscles when asked to perform again  Sensation: -       Impaired  light/touch unable to feel plantar aspect of R foot, gives inconsistent reports to sensation throughout rest of BLE  Coordination: unable to assess due to weakness and poor command following  Tone: hypotonic BLE    Functional Mobility:  Bed Mobility:  Rolling Left:  total assistance  Rolling Right: total assistance  Scooting: total assistance  Supine to Sit: total assistance of 2 persons for LE management and trunk management  Sit to Supine: maximal assistance of 2 persons for LE management and trunk management; pt wanting to lay down and able to assist w/ trunk to do so but left BLE hanging off side of bed, therapist encouraged pt to try lifting legs onto bed but she reported "I can't", and appeared comfortable laying in bed on L side w/ legs off the side of bed w/out efforts to reposition independently so therapist dependently picked up BLE  Transfers:  Sit to Stand: total assistance of 2 persons with no muscle activation/assistance provided by pt so only partial stand performed  Balance:   Static Sitting:  " ranged from CGA-maxA due to frequent changes in pt participation/assistance in maintaining trunk control  at EOB    Therapeutic Activities and Exercises:  Patient educated on role of acute care PT and PT POC, safety while in hospital including calling nurse for mobility, and call light usage  Patient performed 2 set(s) of 4 repetitions of  the following bed level exercises: ankle pumps, heel slides, and hip abduction and seated exercises: ankle pumps, long arc quads, marches, and knee flexion for bilateral LE. Patient required skilled PT for instruction of exercises and appropriate cues to perform exercises safely and appropriately. All exercises performed PROM w/ education/cueing for pt participation, motor imagery, and focus on muscle activation  Patient educated about importance of OOB mobility  Rolling L/R in bed to reposition pt w/ wedges and pillows to offload pressure, avoid muscle contractures, and prevent skin breakdown. Pt educated on importance of pt being rotated every 2hrs for these reasons.     AM-PAC 6 CLICK MOBILITY  Turning over in bed (including adjusting bedclothes, sheets and blankets)?: 1  Sitting down on and standing up from a chair with arms (e.g., wheelchair, bedside commode, etc.): 1  Moving from lying on back to sitting on the side of the bed?: 1  Moving to and from a bed to a chair (including a wheelchair)?: 1  Need to walk in hospital room?: 1  Climbing 3-5 steps with a railing?: 1  Basic Mobility Total Score: 6     Patient left left sidelying with HOB elevated with all lines intact, call button in reach, RN notified, and bed alarm on.    GOALS:   Multidisciplinary Problems       Physical Therapy Goals          Problem: Physical Therapy    Goal Priority Disciplines Outcome Interventions   Physical Therapy Goal     PT, PT/OT Progressing    Description: Goals to be completed by: 2/7/25    Pt will perform sup<>sit transfers w/ moderate assistance  Pt will have sufficient dynamic balance to  sit EOB while performing ADLs/therex w/ minimum assistance  Pt will be able to stand up from EOB w/ maximal assistance using LRAD  Pt will be independent w/ HEP therex on BLE w/ good form and ROM     DME Justifications (see above for complete DME recommendations)  Hospital Bed- Patient requires a hospital bed for positioning of the body in ways that are not feasible with an ordinary bed. The patient requires special positioning for pain relief, limited mobility, and/or being unable to independently make changes in body position without the use of a hospital bed. Pillows and wedges will not be adequate for resolving these positional issues.                         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     Bipolar 1 disorder     Depression 1984    [facility].     Encounter for blood transfusion     Hallucination 2018    [facility].     History of psychiatric hospitalization 1984    Patient stated she has been hosptialiized 20 times.     History of psychiatric hospitalization 2018    3/7/18 [facility]    History of psychiatric hospitalization 2019 2/20/19; 4/9/19; 7/31/19 9/23/19 [facility]    Hx of psychiatric care 2015    Tegretal, Topamax, Thorazin, cogentin, Lithium     Liver disease     Psychiatric problem 1990    Anxiety (generalized anxiety disorder.)    Schizophrenia 1984    [facility]    Seizures     Substance abuse     Suicide attempt 11/01/1984    Therapy 2000    [facility]    Weakness 03/09/2021    Withdrawal symptoms, drug or narcotic        Past Surgical History:   Procedure Laterality Date    COLON RESECTION      COLONOSCOPY N/A 11/14/2022    Procedure: COLONOSCOPY;  Surgeon: Maggie Arriaza MD;  Location: [facility];  Service: Endoscopy;  Laterality: N/A;    COLONOSCOPY N/A 03/05/2024    Procedure:  COLONOSCOPY;  Surgeon: Amadeo Crawford MD;  Location: [facility];  Service: General;  Laterality: N/A;    ESOPHAGOGASTRODUODENOSCOPY N/A 11/14/2022    Procedure: EGD (ESOPHAGOGASTRODUODENOSCOPY);  Surgeon: Maggie Arriaza MD;  Location: [facility];  Service: Endoscopy;  Laterality: N/A;    HYSTERECTOMY         Time Tracking:     PT Received On: 01/24/25  PT Start Time: 1137  PT Stop Time: 1207  PT Total Time (min): 30 min     Billable Minutes: Evaluation 7, Therapeutic Activity 10, and Neuromuscular Re-education 13    01/24/2025

## 2025-01-24 NOTE — PROGRESS NOTES
[facility] - Neurosurgery ([facility])  Neurology  Progress Note    Patient Name: Tresa Henry  MRN: [id number]  Admission Date: 1/20/2025  Hospital Length of Stay: 3 days  Code Status: Full Code   Attending Provider: Ana Mitchell MD  Primary Care Physician: Bina Holguin NP   Principal Problem:Encephalopathy acute    HPI:   Neurology is consulted for acute AMS in the case of Tresa Henry, 64 y.o. F with a history of substance use, psychosis, bipolar disorder, HCV, colon cancer, and HLD. She presented to the ICU on 01/05 following an EMS call where she was found confused, tachycardic, and in pain. Initial concerns included drug intoxication and/or withdrawal. Her hospital course was complicated by MSSA bacteremia, influenza A, multifocal PNA, and HUSEYIN, necessitating ICU admission. She required intubation and central/arterial line placement due to ARDS and was later extubated on 01/14.    Despite treatment, including ABx and Tamiflu, her AMS persisted. Subsequent complications included a R pneumothorax s/p chest tube placement, thrombocytopenia, neutropenia, and hyponatremia. Of note, she has shown some improvement recently, with a slightly more conversant state, though she remains below her cognitive baseline. She is no longer bacteremic, but her azotemia (BUN 49) and hypernatremia (146) persist. MRI brain w/wo was negative, and EEG is pending.    Overview/Hospital Course:  01/22/2025 Improved mentation as expected. Continued absence of any strength in the R foot including areflexia which is concerning but unclear how chronic it may be. Can consider spinal imaging but is likely not urgently needed.  01/23/2025 Further improved mentation. Noted difficulty with breathing, communicated with the primary team. Patient may requires respiratory therapy.        Subjective:     Interval History: See hospital course    Current Facility-Administered Medications   Medication Dose Route Frequency Provider Last Rate  Last Admin    0.9%  NaCl infusion (for blood administration)   Intravenous Q24H PRN Soto Dillon MD        acetaminophen tablet 650 mg  650 mg Per NG tube Q8H PRN Rodolfo Groves MD        benztropine tablet 1 mg  1 mg Per NG tube BID Rodolfo Groves MD   1 mg at 01/23/25 0959    ceFAZolin 2 g  2 g Intravenous Q8H Herrera Cabral DO   2 g at 01/23/25 1221    chlorhexidine 0.12 % solution 15 mL  15 mL Mouth/Throat BID Patric Hurt MD   15 mL at 01/23/25 0959    dextrose 50% injection 12.5 g  12.5 g Intravenous PRN Patric Hurt MD        dextrose 50% injection 25 g  25 g Intravenous PRN Patric Hurt MD        folic acid 1 mg in D5W 100 mL IVPB  1 mg Intravenous Daily Rodolfo Groves MD   Stopped at 01/21/25 1825    glucagon (human recombinant) injection 1 mg  1 mg Intramuscular PRN Patric Hurt MD        glucose chewable tablet 16 g  16 g Per NG tube PRN Rodolfo Groves MD        glucose chewable tablet 24 g  24 g Per NG tube PRN Rodolfo Groves MD        hydrocortisone sodium succinate injection 50 mg  50 mg Intravenous BID Patric Hurt MD   50 mg at 01/23/25 0959    levETIRAcetam injection 500 mg  500 mg Intravenous Q12H Patric Hurt MD   500 mg at 01/23/25 0909    mupirocin 2 % ointment   Nasal BID Baumgarten, Katherine L., MD        naloxone 0.4 mg/mL injection 0.02 mg  0.02 mg Intravenous PRN Patric Hurt MD        OLANZapine zydis disintegrating tablet 5 mg  5 mg Per NG tube Daily Rodolfo Groves MD   5 mg at 01/23/25 1009    pantoprazole injection 40 mg  40 mg Intravenous Daily Patric Hurt MD   40 mg at 01/23/25 1009    sodium chloride 0.9% flush 10 mL  10 mL Intravenous Q12H PRN Patric Hurt MD        thiamine injection 100 mg  100 mg Intravenous Daily Herrera Cabral DO   100 mg at 01/23/25 1009     Review of Systems   Unable to perform ROS:  Mental status change     Objective:     Vital Signs (Most Recent):  Temp: 98 °F (36.7 °C) (01/23/25 0821)  Pulse: 108 (01/23/25 1620)  Resp: 18 (01/23/25 0821)  BP: (!) 125/91 (01/23/25 1620)  SpO2: 98 % (01/23/25 1620) Vital Signs (24h Range):  Temp:  [97.3 °F (36.3 °C)-98.5 °F (36.9 °C)] 98 °F (36.7 °C)  Pulse:  [101-127] 108  Resp:  [16-24] 18  SpO2:  [93 %-98 %] 98 %  BP: (122-134)/(75-92) 125/91     Weight: 64.1 kg (141 lb 5 oz)  Body mass index is 22.13 kg/m².     Physical Exam  Constitutional:       Appearance: She is ill-appearing.   HENT:      Mouth/Throat:      Mouth: Mucous membranes are dry.   Eyes:      Pupils: Pupils are equal, round, and reactive to light.   Cardiovascular:      Rate and Rhythm: Normal rate.   Pulmonary:      Effort: No respiratory distress.   Abdominal:      General: There is no distension.   Skin:     Findings: Bruising present.   Neurological:      Motor: Weakness present.          NEUROLOGICAL EXAMINATION:     CRANIAL NERVES     CN III, IV, VI   Pupils are equal, round, and reactive to light.      Neurological Exam:  MENTAL STATUS  Level of consciousness: alert  Orientation: oriented to person, place, and year, but requires significant prompting  Attention reduced. Concentration reduced.  Speech: reduced content and clarity, both aphasic and dysarthric    CRANIAL NERVES  CN II: Visual fields full to confrontation  CN III, IV, VI: PERRL, EOMI  CN V: Facial sensation intact  CN VII: Facial expression symmetric and full  CN IX, X: Phonation normal  CN XII: Tongue midline with normal movements, no atrophy    MOTOR EXAM  Muscle bulk: reduced generally, but worst in bilateral lower extremity  Muscle tone: normal  Pronator drift: absent    Strength - Upper Extremities   Arm abduction Elbow flexion Elbow extension Wrist flexion Wrist extension Finger abduction   Right 4-/5 4-/5 4-/5 4-/5 4-/5 4-/5   Left 4-/5 4-/5 4-/5 4-/5 4-/5 4-/5     Strength - Lower Extremities   Hip flexion Knee  extension Dorsiflexion Plantarflexion   Right 2/5 0/5 2/5 2/5   Left 5/5 1/5 0/5 0/5       REFLEXES   Biceps Triceps Brachioradialis Patellar   Right +2 +2 +2 +0   Left +2 +2 +2 +0     Toes equivocal bilaterally    SENSORY EXAM  Light touch: intact in all 4 extremities     Significant Labs: All pertinent lab results from the past 24 hours have been reviewed.    Significant Imaging: I have reviewed all pertinent imaging results/findings within the past 24 hours.  Assessment and Plan:     * Encephalopathy acute  64 y.o. F with significant comorbidities, including HCV, psychosis, and colon cancer, presents with acute AMS on a background of MSSA bacteremia, influenza A, multifocal PNA, HUSEYIN, and ARDS requiring ICU-level care. She has had a protracted hospital course involving intubation, extubation, and persistent complications such as pneumothorax, thrombocytopenia, and azotemia. While her AMS has shown slight improvement, she remains below her baseline, with negative MRI findings and an EEG pending. Proximal weakness has developed, starting with the R foot and progressing to her arms, raising concern for a possible neurological or metabolic etiology vs deconditioning.    The persistent AMS in this patient with a history of dementia and low cognitive reserve is likely multifactorial, involving metabolic derangements (azotemia, hypernatremia), infection-related sequelae (prior MSSA bacteremia, influenza A), possible neurotoxicity from meropenem (discontinued) that can take time to resolve even following the resolution of the inciting factor. Chronic cognitive changes with questionable syphilis status.    01/23/2025 Further improved mentation. Noted difficulty with breathing, communicated with the primary team. Patient may requires respiratory therapy.    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

## 2025-01-24 NOTE — ASSESSMENT & PLAN NOTE
Strep pneumo urine antigen positive   MSSA bacteremia; follow-up Bcx cleared  Influenza A positive  UA negative for infectious etiology     Placed on Vanc and Zosyn --> Cefazolin and Zosyn --> Vanc and Rm (changed per ID due to thrombocytopenia and spiking fevers on 01/11). Completed Tamiflu. ID deferred treatment of the maame dubliniensis respiratory culture.     - f/u TTE   Antibiotics (72h ago, onward)      Start     Stop Route Frequency Ordered    01/23/25 2100  mupirocin 2 % ointment         01/28/25 2059 Nasl 2 times daily 01/23/25 1532    01/20/25 2000  ceFAZolin 2 g         -- IV Every 8 hours (non-standard times) 01/20/25 1452          -- ID consulted, alfa sanches

## 2025-01-24 NOTE — CONSULTS
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     Bipolar 1 disorder     Depression 1984    [facility].     Encounter for blood transfusion     Hallucination 2018    Tactile Hallucinations.     History of psychiatric hospitalization 1984    Patient stated she has been hosptialiized 20 times.     History of psychiatric hospitalization 2018    3/7/18 [facility]    History of psychiatric hospitalization 2019    2/20/19; 4/9/19; 7/31/19 9/23/19 [facility]    Hx of psychiatric care 2015    Tegretal, Topamax, Thorazin, cogentin, Lithium     Liver disease     Psychiatric problem 1990    Anxiety (generalized anxiety disorder.)    Schizophrenia 1984    [facility]    Seizures     Substance abuse     Suicide attempt 11/01/1984    Therapy 2000    [facility]    Weakness 03/09/2021    Withdrawal symptoms, drug or narcotic        Social History     Socioeconomic History    Marital status: Single    Number of children: 5   Tobacco Use    Smoking status: Every Day     Current packs/day: 0.50     Average packs/day: 0.5 packs/day for 40.1 years (20.0 ttl pk-yrs)     Types: Cigarettes     Start date: 1975     Last attempt to quit: 12/21/2012     Passive exposure: Never    Smokeless tobacco: Never    Tobacco comments:     Pt declined smoking cessation services at this time 12/21/2023, updated smoking status   Substance and Sexual Activity    Alcohol use: No    Drug use: Not Currently     Types: Marijuana, Amphetamines    Sexual activity: Not Currently     Partners: Male   Other Topics Concern    Patient feels they ought to cut down on drinking/drug use No    Patient annoyed by others criticizing their  drinking/drug use No    Patient has felt bad or guilty about drinking/drug use No    Patient has had a drink/used drugs as an eye opener in the AM No     Social Drivers of Health     Financial Resource Strain: Low Risk  (1/23/2025)    Overall Financial Resource Strain (CARDIA)     Difficulty of Paying Living Expenses: Not very hard   Food Insecurity: No Food Insecurity (1/23/2025)    Hunger Vital Sign     Worried About Running Out of Food in the Last Year: Never true     Ran Out of Food in the Last Year: Never true   Transportation Needs: No Transportation Needs (1/23/2025)    TRANSPORTATION NEEDS     Transportation : No   Physical Activity: Inactive (1/6/2025)    Exercise Vital Sign     Days of Exercise per Week: 0 days     Minutes of Exercise per Session: 0 min   Stress: No Stress Concern Present (1/23/2025)    [employer] [place] [employer] - Occupational Stress Questionnaire     Feeling of Stress : Not at all   Housing Stability: Low Risk  (1/23/2025)    Housing Stability Vital Sign     Unable to Pay for Housing in the Last Year: No     Homeless in the Last Year: No       Precautions:     Allergies as of 01/19/2025 - Reviewed 01/19/2025   Allergen Reaction Noted    Haldol [haloperidol lactate] Hallucinations 04/09/2019    Divalproex Rash 08/22/2022    Risperdal [risperidone] Palpitations 04/09/2019       [facility] Assessment Details/Treatment   Patient seen for wound care consultation.  Chart reviewed for this encounter.  See flow sheet for findings.    Pt in bed and agreeable to care. Intact blister to the left heel - DTPI. Bordered foam dressing present. Partial thickness skin loss to the sacrum with a pink and moist wound bed - likely related to pressure and incontinence. Triad applied. The right heel is intact, pink, dry and blanchable. Pt educated on the wound care and the importance of turning every 2 hours.     Recommendations:  - Sacrum and buttocks: bedside nursing to cleanse with bath wipes, pat  dry and apply triad bid/prn  - Left heel: bedside nursing to cleanse with NS, pat dry and apply an aquacel ag foam dressing (dressing silicone ag silver 4x4 = in epic) every 3 days  - Turning every 2 hours  - Heel lift boot - in use   - Waffle mattress overlay - in use     01/24/25 0938        Wound 01/13/25 1900 Pressure Injury Sacral spine   Date First Assessed/Time First Assessed: 01/13/25 1900   Present on Original Admission: No  Primary Wound Type: Pressure Injury  Location: Sacral spine   Wound Image    Pressure Injury Stage 2   Dressing Appearance Open to air   Drainage Amount Scant   Appearance Pink;Moist   Tissue loss description Partial thickness   Periwound Area Intact;Moist   Care Cleansed with:;Other (see comments)  (bath wipes)   Dressing Applied;Other (comment)  (triad)   Periwound Care Moisture barrier applied        Wound 01/20/25 0900 Pressure Injury Left Heel   Date First Assessed/Time First Assessed: 01/20/25 0900   Present on Original Admission: Yes  Primary Wound Type: Pressure Injury  Side: Left  Location: Heel   Wound Image    Pressure Injury Stage U   Dressing Appearance Dry;Intact   Drainage Amount None   Appearance Intact;Pink;Dry;Blistered   Periwound Area Intact;Dry   Dressing Foam     Recommendations made to primary team for above plan via secure chat. Wound care will follow-up as needed.   01/24/2025

## 2025-01-24 NOTE — PLAN OF CARE
General Neurology Service - Plan of Care Note    64 year old woman with significant comorbidities, including HCV, psychosis, and colon cancer, presents with acute AMS on a background of MSSA bacteremia, influenza A, multifocal PNA, HUSEYIN, and ARDS requiring ICU-level care. She has had a protracted hospital course involving intubation, extubation, and persistent complications such as pneumothorax, thrombocytopenia, and azotemia. MRI without explanatory finding for encephalopathy. Proximal weakness has developed, starting with the R foot and progressing to her arms, raising concern for a possible neurological or metabolic etiology vs deconditioning.     The persistent AMS in this patient with a history of dementia and low cognitive reserve is likely multifactorial, involving metabolic derangements (azotemia, hypernatremia), infection-related sequelae (prior MSSA bacteremia, influenza A), possible neurotoxicity from meropenem (discontinued) that can take time to resolve even following the resolution of the inciting factor. Chronic cognitive changes with questionable syphilis status.     EEG obtained with read showing slowing without epileptiform activity. R common peroneal nerve paralysis.    Has referral to Neurology outpatient regarding     Neurology will sign off at this time as no further recommendations. Please call with questions.      Saray Serna MD  Neurology PGY-4    s/p flexible bronchoscopy  /51  HR 75  RR 16  T 96.5  SpO2 95%

## 2025-01-24 NOTE — PROGRESS NOTES
[facility] - Neurosurgery ([facility])  Adult Nutrition  Progress Note    SUMMARY       Recommendations    1.) If EN is medically warranted, recommend increasing TF of Isosource 1.5 to goal rate of 55ml/hr to provide 1980 kcal, 89g PRO, CHO 222g, and 1026 ml of FF- FWF per MD.      2.) Avoid holding TF for residuals less than 500mL unless associated with other s/s of intolerance such as N/V/D, abd pain/distention.      3.) May provide Chance BID to help aid in wound healing.      4.) Consider to add 500mg of VitC BID to help aid in wound healing.      5.) Consider to add 220mg of zinc sulfate daily, x 30 days to help aid in wound healing.      6.) Continue folic acid and thiamine regimen.      7.) RD to monitor wt, tolerance, skin, labs.   Goals: To meet % of EEN/EPN by next RD f/u   Nutrition Goal Status: progressing towards goal  Communication of RD Recs:  (POC)    Assessment and Plan    Nutrition Problem  Increased nutrient needs (PRO, energy)     Related to (etiology):   Increased demand for protein, energy 2/2 wound healing     Signs and Symptoms (as evidenced by):   Unstageable PI on sacrum      Interventions/Recommendations (treatment strategy):  Collaboration of nutritional care with other providers.    EN  Modulars  Vitamins and minerals     Nutrition Diagnosis Status:   Continues     Malnutrition Assessment    Pending NFPE    Reason for Assessment    Reason For Assessment: RD follow-up  Diagnosis: infection/sepsis (Encephalopathy acute)    General Information Comments: RD f/u: RD providing remote coverage.  TF's were discontinued. Pt remains NPO. Pt is at risk for malnutrition. Sacral wound present. Pt did step down from ICU. S/p lumbar puncture. RD team to continue to monitor and f/u.     Nutrition Discharge Planning: as per medical course    Nutrition Related Social Determinants of Health: SDOH: None Identified     Nutrition/Diet History    Spiritual, Cultural Beliefs, Hinduism Practices, Values that  "Affect Care: no  Food Allergies: NKFA  Factors Affecting Nutritional Intake: impaired cognitive status/motor control, NPO    Anthropometrics    Height: 5' 7" (170.2 cm)  Height (inches): 67 in  Height Method: Stated  Weight: 64.1 kg (141 lb 5 oz)  Weight (lb): 141.32 lb  Weight Method: Bed Scale  Ideal Body Weight (IBW), Female: 135 lb  % Ideal Body Weight, Female (lb): 104.68 %  BMI (Calculated): 22.1       Lab/Procedures/Meds    Pertinent Labs Reviewed: reviewed  Pertinent Labs Comments: BUN: 44, gluc: 184, Ca: 7.3, PRO: 5.9  Pertinent Medications Reviewed: reviewed  Pertinent Medications Comments: Folic acid, pantoprazole, KCl, thiamine    Estimated/Assessed Needs    Weight Used For Calorie Calculations: 64.1 kg (141 lb 5 oz)  Energy Calorie Requirements (kcal): 1923 kcal  Energy Need Method: Kcal/kg (30 kcal/kg (skin wounds present))  Protein Requirements: 77- 96g (1.2- 1.5g/kg)  Weight Used For Protein Calculations: 64.1 kg (141 lb 5 oz)  Fluid Requirements (mL): as per MD or RDA  Estimated Fluid Requirement Method: RDA Method  RDA Method (mL): 1923         Nutrition Prescription Ordered    Current Diet Order: NPO  Current Nutrition Support Formula Ordered: Isosource 1.5  Current Nutrition Support Rate Ordered: 10 (ml)    Evaluation of Received Nutrient/Fluid Intake    I/O: +2.3L since admit  Energy Calories Required: not meeting needs  Protein Required: not meeting needs  Fluid Required:  (as per MD)  Comments: LBM 1/21  Tolerance: tolerating  % Intake of Estimated Energy Needs: 0 - 25 %  % Meal Intake: NPO    Nutrition Risk    Level of Risk/Frequency of Follow-up: moderate - high     Monitor and Evaluation    Food and Nutrient Intake: energy intake, enteral nutrition intake  Food and Nutrient Adminstration: enteral and parenteral nutrition administration  Physical Activity and Function: nutrition-related ADLs and IADLs  Anthropometric Measurements: weight, weight change, body mass index  Biochemical Data, " Medical Tests and Procedures: electrolyte and renal panel, gastrointestinal profile, glucose/endocrine profile, inflammatory profile, lipid profile  Nutrition-Focused Physical Findings: overall appearance, skin     Nutrition Follow-Up    RD Follow-up?: Yes

## 2025-01-24 NOTE — PROGRESS NOTES
[facility] - Neurosurgery ([facility])  [facility] Medicine  Progress Note    Patient Name: Tresa Henry  MRN: [id number]  Patient Class: IP- Inpatient   Admission Date: 1/20/2025  Length of Stay: 4 days  Attending Physician: Sin Aguayo MD  Primary Care Provider: Bina Holguin NP        Subjective     Principal Problem:Encephalopathy acute        HPI:  Tresa Henry is 64 y.o. F with Mhx of f substance use, psychosis, bipolar disorder, HCV, colon cancer, and hyperlipidemia presented to [facility] ICU on 01/05 with AMS. EMS was called to scene where pt was found confused, tachycardic and in pain. Concern for potential drug intoxication and/or withdrawal per EMS. Required admission to ICU for the AMS, Mutlifocal PNA and HUSEYIN. Found to have MSSA bacteremia and positive for influenza A; treated with abx and Tamiflu, respectively. Brief step down from the ICU on 01/09 with improved mentation. Hospital course complicated by large R sided pneumothorax s/p chest tube placement, thrombocytopenia, neutropenia, and hyponatremia. Stepped back to ICU on 01/11 for subsequently requiring intubation, central line, and arterial line for concerns of ARDS; extubated on 01/14. Subsequent Bcx NGTD and NGT placed for nutrition. She required BiPAP intermittently for work breathing for a period of time, but has been on stable nasal cannula recently. Of recent, pt unable to her R foot, followed by inability to arms. Despite prolonged admission and care, patient continued to experience AMS.       With persistent encephalopathy of uncertain etiology, referring team is requested transfer to [facility] for continued treatment of acute encephalopathy.      Prior to transfer :  Na 148, K 4.1, chloride 113, CO2 25, BUN 48, creatinine 1, glucose 144, albumin 2.4, AST 29, ALT 11, magnesium 1.8, phosphorus 2.2, white blood cells 5.69, hemoglobin 8.6, hematocrit 27.3, platelets 118  -pH 7.53, pCO2 36, pO2 84  -chest x-ray showed  interval removal of the small bore chest tube from the right hemithorax.  Decrease in the consolidation in lungs bilaterally since the prior examination.    Overview/Hospital Course:  Pt hospital course was complicated by MSSA bacteremia, influenza A, multifocal PNA, and HUSEYIN, necessitating ICU admission. She required intubation and central/arterial line placement due to ARDS and was later extubated on 01/14. Despite treatment, including ABx and Tamiflu, her AMS persisted. Subsequent complications included a R pneumothorax s/p chest tube placement, thrombocytopenia, neutropenia, and hyponatremia. Of note, she has shown some improvement recently, with a slightly more conversant state, though she remains below her cognitive baseline. She is no longer bacteremic, but her azotemia (BUN 49) and hypernatremia (146) persist. MRI brain w/wo was negative, and EEG without epileptiform activity. Neurology will f/u in clinic at [place]. Mentation notably improved on 1/24, awake and alert though oriented x1. Cleared for level 5 diet. Pending ID evaluation for possible syphilis tx.     Interval History: NAEON. Awake and alert and oriented x1. Calm and in no distress. Denies any acute complaints. Reports wanting to eat some food.     Review of Systems   Unable to perform ROS: Mental status change     Objective:     Vital Signs (Most Recent):  Temp: 98.6 °F (37 °C) (01/24/25 0819)  Pulse: 97 (01/24/25 1138)  Resp: 18 (01/24/25 0819)  BP: (!) 155/84 (01/24/25 0819)  SpO2: 100 % (01/24/25 0819) Vital Signs (24h Range):  Temp:  [98.4 °F (36.9 °C)-98.8 °F (37.1 °C)] 98.6 °F (37 °C)  Pulse:  [] 97  Resp:  [18] 18  SpO2:  [98 %-100 %] 100 %  BP: (125-155)/(84-91) 155/84     Weight: 64.1 kg (141 lb 5 oz)  Body mass index is 22.13 kg/m².  No intake or output data in the 24 hours ending 01/24/25 1506        Physical Exam  Constitutional:       General: She is not in acute distress.     Appearance: She is not ill-appearing, toxic-appearing  "or diaphoretic.   HENT:      Head: Normocephalic and atraumatic.      Nose: No congestion.      Mouth/Throat:      Mouth: Mucous membranes are dry.   Eyes:      Extraocular Movements: Extraocular movements intact.      Pupils: Pupils are equal, round, and reactive to light.   Cardiovascular:      Rate and Rhythm: Normal rate and regular rhythm.      Heart sounds: Normal heart sounds. No murmur heard.     No friction rub. No gallop.   Pulmonary:      Effort: No respiratory distress.      Breath sounds: No wheezing.   Abdominal:      General: Bowel sounds are normal. There is no distension.      Palpations: Abdomen is soft.      Tenderness: There is no abdominal tenderness. There is no right CVA tenderness or left CVA tenderness.   Musculoskeletal:         General: No swelling, tenderness, deformity or signs of injury. Normal range of motion.      Cervical back: Normal range of motion. No tenderness.   Skin:     General: Skin is warm and dry.      Findings: Bruising present. No lesion.   Neurological:      Mental Status: She is alert.      Comments: Oriented to person only (states she is [place] and the year is "September")   Psychiatric:         Mood and Affect: Mood normal.             Significant Labs: All pertinent labs within the past 24 hours have been reviewed.    Significant Imaging: I have reviewed all pertinent imaging results/findings within the past 24 hours.    Assessment and Plan     * Encephalopathy acute  Found by EMS with high suspicion for substance use. UDS THC presumed positive. CT head without acute intracranial pathology. HIV hegative, RPR positive. Hx of untreated Hep C. Collateral history provided by sister in the chart reports pt with history of worsening dementia however current mentation is not baseline. Prior to admission, sister reports that patient experience cough and fevers. Likely multifactorial: metabolic secondary to electrolyte disturbances, sepsis and/or substance abuse. Likely " compounded by delirium given prolonged critical admission. Neurology consulted, agree with multifactorial etiology. Will f/u in clinic at [place]. Mentation improving, though still oriented x1 only.     - neurochecks  - aspiration, delirium and fall precautions  - Continued correction of metabolic parameters, including azotemia, hyperammonia, hypocalcemia and hypernatremia (now resolved)  - ID consulted for possible syphilis tx   - Delirium precautions  - empiric high dose IV thiamine for 5 days, followed by 100mg po    Paralysis of right common peroneal nerve  -- will f/u in neuro clinic at [place]       Tobacco dependency  Dangers of cigarette smoking were reviewed with patient in detail. Patient was Counseled for 3-10 minutes. Nicotine replacement options were discussed. Nicotine replacement was discussed- not prescribed per patient's request    Delirium  Patient with acute delirium. There is no specific treatment. Will avoid narcotics/benzos that are known to worsen condition and add PRN antipsychotics to limit behaviors of self harm. Monitor closely.  Fall and delirium precautions.     Hypernatremia  Hypernatremia is likely due to Dehydration. The patient's most recent sodium results are listed below.  Recent Labs     01/22/25  0353 01/23/25  0325 01/24/25  0337    140 147*       Plan  - Aim to correct the sodium by 8-10mEq in 24 hours.   - Will plan to trend the patient's sodium: Daily  - The patient's hypernatremia is stable  - encourage PO now that patient is more awake. Can give additional IVF if continued hypernatremia despite PO intake     Secondary spontaneous pneumothorax  S/p chest tube placement and adequate re-expansion on repeat CXR. Etiology of pneumothorax potentially from substance inhalation causing lung damage overtime. CT c/a/p 10/2024 with evidence of aerated lungs; however severe lung damage with evidence of ephysema and opacification noted on most recent CT.     MSSA bacteremia  Bxc 01/05 MSSA.  Was on Vanc and Zosyn; however transitioned to Vanc and Meropenem due to thrombocytopenia per ID.      - on ancef at last hospital, cont for now   - f/u echo  - ID consulted, appreciate recs     Influenza A with pneumonia  RIP 01/05 positive for influenza A. Completed Tamiflu.     Substance abuse  C/f polysubstance abuse. UDS 01/05 presumed positive for THC. Completed Klonopin taper per Psych at OSH for potential benzo withdrawal. On empiric IV thiamine with planned transition to PO     Sepsis with acute organ dysfunction  Strep pneumo urine antigen positive   MSSA bacteremia; follow-up Bcx cleared  Influenza A positive  UA negative for infectious etiology     Placed on Vanc and Zosyn --> Cefazolin and Zosyn --> Vanc and Rm (changed per ID due to thrombocytopenia and spiking fevers on 01/11). Completed Tamiflu. ID deferred treatment of the maame dubliniensis respiratory culture.     - f/u TTE   Antibiotics (72h ago, onward)      Start     Stop Route Frequency Ordered    01/23/25 2100  mupirocin 2 % ointment         01/28/25 2059 Nasl 2 times daily 01/23/25 1532    01/20/25 2000  ceFAZolin 2 g         -- IV Every 8 hours (non-standard times) 01/20/25 1452          -- ID consulted, appreciate recs     Colon cancer  -h/o noted      VTE Risk Mitigation (From admission, onward)           Ordered     enoxaparin injection 40 mg  Every 24 hours         01/24/25 1503     Place sequential compression device  Until discontinued         01/20/25 0859     IP VTE HIGH RISK PATIENT  Once         01/20/25 0853     Place sequential compression device  Until discontinued         01/20/25 0853                    Discharge Planning   NICK: 2/5/2025     Code Status: Full Code   Medical Readiness for Discharge Date:                    Please place Justification for DME        Sin Aguayo MD  Department of Hospital Medicine   [facility] - Neurosurgery ([facility])

## 2025-01-24 NOTE — PT/OT/SLP PROGRESS
"Speech Language Pathology Treatment    Patient Name:  Tresa Henry   MRN:  [id number]  Admitting Diagnosis: Encephalopathy acute    Recommendations:                 General Recommendations:  Dysphagia therapy  Diet recommendations:  Minced & Moist Diet - IDDSI Level 5, Liquid Diet Level: Thin liquids - IDDSI Level 0   Aspiration Precautions:   1 bite/sip at a time  Assistance with meals  Eliminate distractions  Feed only when awake/alert  HOB to 90 degrees  Meds whole buried in puree  Strict aspiration precautions   General Precautions: Standard, aspiration, droplet, fall  Communication strategies:  none    Assessment:     Tresa Henry is a 64 y.o. female with an SLP diagnosis of Dysphagia. She presents with improving mentation and tolerance of PO intake and is appropriate for upgrade to a modified diet. SLP to continue to follow.      Subjective     Spoke with nursing prior to session. Pt found resting in bed upon SLP entry into room. Pt agreeable to participate in all aspects of session.      Patient goals: "What's going on in the world?"      Pain/Comfort:  Pain Rating 1: 0/10    Respiratory Status: Nasal cannula, flow 2 L/min    Objective:     Has the patient been evaluated by SLP for swallowing?   Yes  Keep patient NPO? No   Current Respiratory Status:        Pt seen for ongoing dysphagia therapy. Significantly improved levels of alertness noted this date though pt remained altered with tangential speech and ongoing need for repetition of task directions. NG tube removed since most recent session though circumstances surround NG dislodgement unclear. HOB elevated for all PO trials. Ice chip trials x4 and tsp trials of thin liquids x3 tolerated without difficulty. Tsp trials of puree difficult to complete 2/2 decreased mouth opening with pt requiring persistent cueing to open mouth wider to administer puree boluses; however, despite continued cueing, trismus remained. Kamaljit cracker pieces coated in " pudding revealed slowed though functional mastication achieved across x3 trials. Solid trials deferred 2/2 oral phase deficits. SLP provided education regarding overall impressions, upgrade to a soft and bite sized diet with thin liquids, safe swallow strategies, and ongoing SLP POC. Pt verbalized understanding but would continue to benefit from ongoing education. Spoke with RN regarding impressions and recommendations and nursing verbalized understanding. Whiteboard updated.       Goals:   Multidisciplinary Problems       SLP Goals          Problem: SLP    Goal Priority Disciplines Outcome   SLP Goal     SLP Progressing   Description: Speech Pathology Goals  To be met by 2/4/25    1. Pt will participate in ongoing diagnostic dysphagia therapy                              Plan:     Patient to be seen:  4 x/week   Plan of Care expires:  01/30/25  Plan of Care reviewed with:  patient   SLP Follow-Up:  Yes       Discharge recommendations:  High Intensity Therapy   Barriers to Discharge:  None    Time Tracking:     SLP Treatment Date:   01/24/25  Speech Start Time:  1031  Speech Stop Time:  1041     Speech Total Time (min):  10 min    Billable Minutes: Treatment Swallowing Dysfunction 10      01/24/2025

## 2025-01-24 NOTE — SUBJECTIVE & OBJECTIVE
Subjective:     Interval History: See hospital course    Current Facility-Administered Medications   Medication Dose Route Frequency Provider Last Rate Last Admin    0.9%  NaCl infusion (for blood administration)   Intravenous Q24H PRN Soto Dillon MD        acetaminophen tablet 650 mg  650 mg Per NG tube Q8H PRN Rodolfo Groves MD        benztropine tablet 1 mg  1 mg Per NG tube BID Rodolfo Groves MD   1 mg at 01/23/25 0959    ceFAZolin 2 g  2 g Intravenous Q8H Herrera Cabral DO   2 g at 01/23/25 1221    chlorhexidine 0.12 % solution 15 mL  15 mL Mouth/Throat BID Patric Hurt MD   15 mL at 01/23/25 0959    dextrose 50% injection 12.5 g  12.5 g Intravenous PRN Patric Hurt MD        dextrose 50% injection 25 g  25 g Intravenous PRN Patric Hurt MD        folic acid 1 mg in D5W 100 mL IVPB  1 mg Intravenous Daily Rodolfo Groves MD   Stopped at 01/21/25 1825    glucagon (human recombinant) injection 1 mg  1 mg Intramuscular PRN Patric Hurt MD        glucose chewable tablet 16 g  16 g Per NG tube PRN Rodolfo Groves MD        glucose chewable tablet 24 g  24 g Per NG tube PRN Rodolfo Groves MD        hydrocortisone sodium succinate injection 50 mg  50 mg Intravenous BID Patric Hurt MD   50 mg at 01/23/25 0959    levETIRAcetam injection 500 mg  500 mg Intravenous Q12H Patric Hurt MD   500 mg at 01/23/25 0909    mupirocin 2 % ointment   Nasal BID Baumgarten, Katherine L., MD        naloxone 0.4 mg/mL injection 0.02 mg  0.02 mg Intravenous PRN Patric Hurt MD        OLANZapine zydis disintegrating tablet 5 mg  5 mg Per NG tube Daily Rodolfo Groves MD   5 mg at 01/23/25 1009    pantoprazole injection 40 mg  40 mg Intravenous Daily Patirc Hurt MD   40 mg at 01/23/25 1009    sodium chloride 0.9% flush 10 mL  10 mL Intravenous Q12H PRN Patric Hurt MD         thiamine injection 100 mg  100 mg Intravenous Daily Herrera Cabral DO   100 mg at 01/23/25 1009     Review of Systems   Unable to perform ROS: Mental status change     Objective:     Vital Signs (Most Recent):  Temp: 98 °F (36.7 °C) (01/23/25 0821)  Pulse: 108 (01/23/25 1620)  Resp: 18 (01/23/25 0821)  BP: (!) 125/91 (01/23/25 1620)  SpO2: 98 % (01/23/25 1620) Vital Signs (24h Range):  Temp:  [97.3 °F (36.3 °C)-98.5 °F (36.9 °C)] 98 °F (36.7 °C)  Pulse:  [101-127] 108  Resp:  [16-24] 18  SpO2:  [93 %-98 %] 98 %  BP: (122-134)/(75-92) 125/91     Weight: 64.1 kg (141 lb 5 oz)  Body mass index is 22.13 kg/m².     Physical Exam  Constitutional:       Appearance: She is ill-appearing.   HENT:      Mouth/Throat:      Mouth: Mucous membranes are dry.   Eyes:      Pupils: Pupils are equal, round, and reactive to light.   Cardiovascular:      Rate and Rhythm: Normal rate.   Pulmonary:      Effort: No respiratory distress.   Abdominal:      General: There is no distension.   Skin:     Findings: Bruising present.   Neurological:      Motor: Weakness present.          NEUROLOGICAL EXAMINATION:     CRANIAL NERVES     CN III, IV, VI   Pupils are equal, round, and reactive to light.      Neurological Exam:  MENTAL STATUS  Level of consciousness: alert  Orientation: oriented to person, place, and year, but requires significant prompting  Attention reduced. Concentration reduced.  Speech: reduced content and clarity, both aphasic and dysarthric    CRANIAL NERVES  CN II: Visual fields full to confrontation  CN III, IV, VI: PERRL, EOMI  CN V: Facial sensation intact  CN VII: Facial expression symmetric and full  CN IX, X: Phonation normal  CN XII: Tongue midline with normal movements, no atrophy    MOTOR EXAM  Muscle bulk: reduced generally, but worst in bilateral lower extremity  Muscle tone: normal  Pronator drift: absent    Strength - Upper Extremities   Arm abduction Elbow flexion Elbow extension Wrist flexion Wrist  extension Finger abduction   Right 4-/5 4-/5 4-/5 4-/5 4-/5 4-/5   Left 4-/5 4-/5 4-/5 4-/5 4-/5 4-/5     Strength - Lower Extremities   Hip flexion Knee extension Dorsiflexion Plantarflexion   Right 2/5 0/5 2/5 2/5   Left 5/5 1/5 0/5 0/5       REFLEXES   Biceps Triceps Brachioradialis Patellar   Right +2 +2 +2 +0   Left +2 +2 +2 +0     Toes equivocal bilaterally    SENSORY EXAM  Light touch: intact in all 4 extremities     Significant Labs: All pertinent lab results from the past 24 hours have been reviewed.    Significant Imaging: I have reviewed all pertinent imaging results/findings within the past 24 hours.

## 2025-01-24 NOTE — ASSESSMENT & PLAN NOTE
Found by EMS with high suspicion for substance use. UDS THC presumed positive. CT head without acute intracranial pathology. HIV hegative, RPR positive. Hx of untreated Hep C. Collateral history provided by sister in the chart reports pt with history of worsening dementia however current mentation is not baseline. Prior to admission, sister reports that patient experience cough and fevers. Likely multifactorial: metabolic secondary to electrolyte disturbances, sepsis and/or substance abuse. Likely compounded by delirium given prolonged critical admission. Neurology consulted, agree with multifactorial etiology. Will f/u in clinic at [facility]. Mentation improving, though still oriented x1 only.     - neurochecks  - aspiration, delirium and fall precautions  - Continued correction of metabolic parameters, including azotemia, hyperammonia, hypocalcemia and hypernatremia (now resolved)  - ID consulted for possible syphilis tx   - Delirium precautions  - empiric high dose IV thiamine for 5 days, followed by 100mg po

## 2025-01-24 NOTE — PROGRESS NOTES
EEG Hook up  No sign of skin breakdown noticed    Skin Integrity: Normal     Ranjith Navarro   01/24/2025 9:45 AM

## 2025-01-24 NOTE — ASSESSMENT & PLAN NOTE
Suspected R common peroneal paralysis     Recommendation  -- Referral to outpatient neurology (order placed)

## 2025-01-24 NOTE — PLAN OF CARE
Problem: Adult Inpatient Plan of Care  Goal: Plan of Care Review  Outcome: Progressing  Goal: Readiness for Transition of Care  Outcome: Progressing     Problem: Acute Kidney Injury/Impairment  Goal: Fluid and Electrolyte Balance  Outcome: Progressing     Problem: Pneumonia  Goal: Effective Oxygenation and Ventilation  Outcome: Progressing     Problem: ARDS (Acute Respiratory Distress Syndrome)  Goal: Effective Oxygenation  Outcome: Progressing     Problem: Wound  Goal: Skin Health and Integrity  Outcome: Progressing     Problem: Skin Injury Risk Increased  Goal: Skin Health and Integrity  Outcome: Progressing     Problem: Fall Injury Risk  Goal: Absence of Fall and Fall-Related Injury  Outcome: Progressing     Problem: Infection  Goal: Absence of Infection Signs and Symptoms  Outcome: Progressing

## 2025-01-24 NOTE — PLAN OF CARE
[facility] - Neurosurgery (Hospital)  Initial Discharge Assessment       Primary Care Provider: Bina Holguin NP    Admission Diagnosis: Acute encephalopathy [G93.40]    Admission Date: 1/20/2025  Expected Discharge Date: 2/5/2025    Transition of Care Barriers: None    Payor: MEDICAID / Plan: [facility] COMMUNITY PLAN [facility] ([place] MEDICAID) / Product Type: Managed Medicaid /     Extended Emergency Contact Information  Primary Emergency Contact: MARKEL CHATMAN  Mobile Phone: [phone]  Relation: Daughter  Preferred language: English   needed? No  Secondary Emergency Contact: Lynda Manzo  Mobile Phone: [phone]  Relation: Sister    Discharge Plan A: Rehab  Discharge Plan B: Skilled Nursing Facility      [facility] [place], [place] - [address]  [address] [facility] [address]  Phone: [phone] Fax: [phone]    [facility]  [address] [place] [id number]  Phone: [phone] Fax: [phone]    [facility] [address] [place] [address] [address] [facility]  [address] [facility] [address]  Phone: [phone] Fax: [phone]    CM obtained discharge planning assessment from medical record and brief conversation with family who was not able to provide much information.  Initial Assessment (most recent)       Adult Discharge Assessment - 01/24/25 1717          Discharge Assessment    Assessment Type Discharge Planning Assessment     Confirmed/corrected address, phone number and insurance Yes     Source of Information family;health record     Communicated NICK with patient/caregiver Date not available/Unable to determine     Reason For Admission Encephalopathy acute     People in Home child(gomez), adult     Facility Arrived From: [facility]     Do you expect to return to your current living situation? Yes     Do you have help at home or someone to help you manage your care at home? Yes     Who are your caregiver(s) and their phone  number(s)? Amber Walters - daughter [phone] and Lynda Manzo - daughter [phone]     Prior to hospitilization cognitive status: Unable to Assess     Current cognitive status: Unable to Assess     Walking or Climbing Stairs Difficulty yes     Walking or Climbing Stairs stair climbing difficulty, dependent     Dressing/Bathing Difficulty yes     Dressing/Bathing bathing difficulty, dependent     Equipment Currently Used at Home none     Readmission within 30 days? No     Patient currently being followed by outpatient case management? No     Do you currently have service(s) that help you manage your care at home? No     Do you take prescription medications? Yes     Do you have prescription coverage? Yes     Coverage MEDICAID - [facility] COMMUNITY [facility] ([place] MEDICAID)     Do you have any problems affording any of your prescribed medications? No     Is the patient taking medications as prescribed? yes     Who is going to help you get home at discharge? family     How do you get to doctors appointments? family or friend will provide     Are you on dialysis? No     Do you take coumadin? No     Discharge Plan A Rehab     Discharge Plan B Skilled Nursing Facility     DME Needed Upon Discharge  other (see comments)   tbd    Transition of Care Barriers None        OTHER    Name(s) of People in Home daughter

## 2025-01-24 NOTE — PROCEDURES
Date of service  01/24/2025    Introduction  Electroencephalographic (EEG) recording is performed with electrodes placed according to the International 10-20 placement system. Thirty two (32) channels of digital signal (sampling rate of 512/sec) including T1 and T2 was simultaneously recorded from the scalp and may include EKG, EMG, and/or eye monitors. Recording band pass was 0.1 to 512 Hz. Digital video recording of the patient is simultaneously recorded with the EEG. The patient is instructed to report clinical symptoms which may occur during the recording session. EEG and video recording is stored and archived in digital format. Activation procedures which include photic stimulation, hyperventilation and instructing patients to perform simple tasks are done in selected patients.    The EEG is displayed on a monitor screen and can be reviewed using different montages. Computer assisted analysis is employed to detect spike and electrographic seizure activity. The entire record is submitted for computer analysis. The entire recording is visually reviewed and, the times identified by computer analysis as being spikes or seizures are reviewed again.     Compressed spectral analysis (CSA) is also performed on the activity recorded from each individual channel. This is displayed as a power display of frequencies from 0 to 30 Hz over time. The CSA is reviewed looking for asymmetries in power between homologous areas of the scalp and then compared with the original EEG recording.     Findings  The short-term video EEG recording during wakefulness contains 9 Hz alpha activity over the posterior head regions. There is mild diffuse slowing in the range of 5-7 Hz, maximal bitemporal.      Photic stimulation and hyperventilation were not performed.     During the recording, the patient became drowsy but did not fall asleep.     Interpretation  The short-term video EEG shows mild diffuse nonspecific slowing of the background.   No potentially epileptiform activity was seen.

## 2025-01-24 NOTE — CONSULTS
[facility] - Neurosurgery ([facility])  Infectious Disease  Consult Note    Patient Name: Tresa Henry  MRN: [id number]  Admission Date: 1/20/2025  Hospital Length of Stay: 4 days  Attending Physician: Sin Aguayo MD  Primary Care Provider: Bina Holguin NP     Isolation Status: No active isolations    Patient information was obtained from patient, past medical records, and ER records.      Inpatient consult to Infectious Diseases  Consult performed by: Lidia Isaac MD  Consult ordered by: Homar Khan PA-C        Assessment/Plan:     ID  MSSA bacteremia  Patient with MSSA bacteremia diagnosed on 1/5 at [facility]. Blood cultures sterile on 1/7. Bacteremia suspect to be a secondary bacterial infection in the setting of influenza A.  Patient has completed at least 2 weeks of antibiotic therapy.  Can likely stop cefazolin.  If concern for endocarditis then would recommend at least a TTE.     Syphilis  I have reviewed hospital notes from   service and other specialty providers. I have also reviewed CBC, CMP/BMP,  cultures and imaging with my interpretation as documented.     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 Per discussion with patient she was treated in the [facility]. Due to the current weather event I was unable to confirm if patient was treated in the past. I was also not able to confirm with the [facility] department STI department whether patient has been treated. She underwent a diagnostic lumbar puncture which failed to show any abnormalities.  Patient not opposed to re-treatment.  If no concern for neurosyphilis then would re-treat with penicillin G benzathine 2.4 million units once per week for 3 weeks.   If concern for neurosyphilis then can consider therapy with aqueous penicillin G vs doxycycline 100 mg PO BID for 28 days.   If not wanting to pursue treatment can call [facility] Dept on Monday to confirm if patient  "has been treated in past and decide next best steps.             Thank you for your consult. I will sign off. Please contact us if you have any additional questions.    Lidia Mckenna MD  Infectious Disease  [facility] - Neurosurgery (Hospital)    75 minutes of total time spent on the encounter, which includes face to face time and non-face to face time preparing to see the patient (eg, review of tests), obtaining and/or reviewing separately obtained history, documenting clinical information in the electronic or other health record, independently interpreting results (not separately reported) and communicating results to the patient/family/caregiver, or care coordination (not separately reported).     Subjective:     Principal Problem: Encephalopathy acute    HPI: A 64-year-old woman with colon adenocarcinoma s/p resection with right hemicolectomy, chronic HCV, depression, bipolar 1 disorder, and history of substance use who originally presented to [facility] with encephalopathy. She was admitted and diagnosed with multifocal pneumonia and HUSEYIN. Work up subsequently revealed influenza A infection and MSSA bacteremia. Her hospital course was further complicated by large right sided pneumothorax requiring chest tube placement, hyponatremia, neutropenia and thrombocytopenia. [place] [facility] [place]opathy of unclear etiology.     Infectious Diseases is consulted on 1/24 for "RPR & positive FTA-Abs seemingly untreated. MSSA bacteremia on ancef"          Past Medical History:   Diagnosis Date    Addiction to drug 1984    [facility]    Anxiety 11/1984    [facility].     Bipolar 1 disorder     Depression 1984    [facility].     Encounter for blood transfusion     Hallucination 2018    Tactile Hallucinations.     History of psychiatric hospitalization 1984    Patient stated she has been hosptialiized 20 times.     " History of psychiatric hospitalization 2018    3/7/18 [facility]    History of psychiatric hospitalization 2019 2/20/19; 4/9/19; 7/31/19 9/23/19 [facility]    Hx of psychiatric care 2015    Tegretal, Topamax, Thorazin, cogentin, Lithium     Liver disease     Psychiatric problem 1990    Anxiety (generalized anxiety disorder.)    Schizophrenia 1984    [facility] [place]    Seizures     Substance abuse     Suicide attempt 11/01/1984    Therapy 2000    [facility]    Weakness 03/09/2021    Withdrawal symptoms, drug or narcotic        Past Surgical History:   Procedure Laterality Date    COLON RESECTION      COLONOSCOPY N/A 11/14/2022    Procedure: COLONOSCOPY;  Surgeon: Maggie Arriaza MD;  Location: [facility] ENDO;  Service: Endoscopy;  Laterality: N/A;    COLONOSCOPY N/A 03/05/2024    Procedure: COLONOSCOPY;  Surgeon: Amadeo Crawford MD;  Location: [facility];  Service: General;  Laterality: N/A;    ESOPHAGOGASTRODUODENOSCOPY N/A 11/14/2022    Procedure: EGD (ESOPHAGOGASTRODUODENOSCOPY);  Surgeon: Maggie Arriaza MD;  Location: [facility];  Service: Endoscopy;  Laterality: N/A;    HYSTERECTOMY         Review of patient's allergies indicates:   Allergen Reactions    Haldol [haloperidol lactate] Hallucinations    Divalproex Rash    Risperdal [risperidone] Palpitations       Medications:  Medications Prior to Admission   Medication Sig    atorvastatin (LIPITOR) 10 MG tablet TAKE ONE TABLET BY MOUTH ONCE DAILY FOR CHOLESTEROL    benztropine (COGENTIN) 1 MG tablet Take 1 mg by mouth 2 (two) times daily.    carBAMazepine (TEGRETOL) 200 mg tablet Take 200 mg by mouth 2 (two) times a day.    chlorproMAZINE (THORAZINE) 100 MG tablet Take 200 mg by mouth every evening.     clonazePAM (KLONOPIN) 1 MG tablet Take 1 mg by mouth 2 (two) times daily.    ibuprofen (ADVIL,MOTRIN) 800 MG tablet TAKE ONE TABLET BY MOUTH EVERY 8 HOURS WITH FOOD or milk AS NEEDED FOR PAIN    levETIRAcetam (KEPPRA) 500 MG Tab TAKE  ONE TABLET BY MOUTH 2 TIMES A DAY    LINZESS 290 mcg Cap capsule Take 290 mcg by mouth.    lurasidone (LATUDA) 80 mg Tab tablet Take 40 mg by mouth 2 (two) times a day.    prazosin (MINIPRESS) 1 MG Cap Take 1 mg by mouth 2 (two) times daily.     Antibiotics (From admission, onward)      Start     Stop Route Frequency Ordered    01/23/25 2100  mupirocin 2 % ointment         01/28/25 2059 Nasl 2 times daily 01/23/25 1532 01/20/25 2000  ceFAZolin 2 g         -- IV Every 8 hours (non-standard times) 01/20/25 1452          Antifungals (From admission, onward)      None          Antivirals (From admission, onward)      None             Immunization History   Administered Date(s) Administered    Tdap 09/20/2019       Family History       Problem Relation (Age of Onset)    Liver cancer Brother    No Known Problems Father, Sister, Maternal Aunt    Schizophrenia Mother          Social History     Socioeconomic History    Marital status: Single    Number of children: 5   Tobacco Use    Smoking status: Every Day     Current packs/day: 0.50     Average packs/day: 0.5 packs/day for 40.1 years (20.0 ttl pk-yrs)     Types: Cigarettes     Start date: 1975     Last attempt to quit: 12/21/2012     Passive exposure: Never    Smokeless tobacco: Never    Tobacco comments:     Pt declined smoking cessation services at this time 12/21/2023, updated smoking status   Substance and Sexual Activity    Alcohol use: No    Drug use: Not Currently     Types: Marijuana, Amphetamines    Sexual activity: Not Currently     Partners: Male   Other Topics Concern    Patient feels they ought to cut down on drinking/drug use No    Patient annoyed by others criticizing their drinking/drug use No    Patient has felt bad or guilty about drinking/drug use No    Patient has had a drink/used drugs as an eye opener in the AM No     Social Drivers of Health     Financial Resource Strain: Low Risk  (1/23/2025)    Overall Financial Resource Strain (CARDIA)      Difficulty of Paying Living Expenses: Not very hard   Food Insecurity: No Food Insecurity (1/23/2025)    Hunger Vital Sign     Worried About Running Out of Food in the Last Year: Never true     Ran Out of Food in the Last Year: Never true   Transportation Needs: No Transportation Needs (1/23/2025)    TRANSPORTATION NEEDS     Transportation : No   Physical Activity: Inactive (1/6/2025)    Exercise Vital Sign     Days of Exercise per Week: 0 days     Minutes of Exercise per Session: 0 min   Stress: No Stress Concern Present (1/23/2025)    [employer] [place] [employer] - Occupational Stress Questionnaire     Feeling of Stress : Not at all   Housing Stability: Low Risk  (1/23/2025)    Housing Stability Vital Sign     Unable to Pay for Housing in the Last Year: No     Homeless in the Last Year: No     Review of Systems   Constitutional:  Negative for chills, fatigue, fever and unexpected weight change.   HENT:  Negative for ear pain, facial swelling, hearing loss, mouth sores, nosebleeds, rhinorrhea, sinus pressure, sore throat, tinnitus, trouble swallowing and voice change.    Eyes:  Negative for photophobia, pain, redness and visual disturbance.   Respiratory:  Negative for cough, chest tightness, shortness of breath and wheezing.    Cardiovascular:  Negative for chest pain, palpitations and leg swelling.   Gastrointestinal:  Negative for abdominal pain, blood in stool, constipation, diarrhea, nausea and vomiting.   Endocrine: Negative for cold intolerance, heat intolerance, polydipsia, polyphagia and polyuria.   Genitourinary:  Negative for decreased urine volume, dysuria, flank pain, frequency, hematuria, menstrual problem, urgency, vaginal bleeding, vaginal discharge and vaginal pain.   Musculoskeletal:  Negative for arthralgias, back pain, joint swelling, myalgias and neck pain.   Skin:  Negative for rash.   Allergic/Immunologic: Negative for environmental allergies, food allergies and immunocompromised  state.   Neurological:  Negative for dizziness, seizures, syncope, weakness, light-headedness, numbness and headaches.   Hematological:  Negative for adenopathy. Does not bruise/bleed easily.   Psychiatric/Behavioral:  Positive for confusion. Negative for hallucinations, self-injury, sleep disturbance and suicidal ideas. The patient is not nervous/anxious.      Objective:     Vital Signs (Most Recent):  Temp: 99.3 °F (37.4 °C) (01/24/25 1921)  Pulse: 98 (01/24/25 1922)  Resp: 18 (01/24/25 1921)  BP: (!) 163/95 (01/24/25 1922)  SpO2: 98 % (01/24/25 1922) Vital Signs (24h Range):  Temp:  [98.4 °F (36.9 °C)-99.3 °F (37.4 °C)] 99.3 °F (37.4 °C)  Pulse:  [] 98  Resp:  [18] 18  SpO2:  [98 %-100 %] 98 %  BP: (131-166)/(76-95) 163/95     Weight: 64.1 kg (141 lb 5 oz)  Body mass index is 22.13 kg/m².    Estimated Creatinine Clearance: 55.3 mL/min (based on SCr of 1 mg/dL).     Physical Exam  Vitals and nursing note reviewed.   Constitutional:       Appearance: She is well-developed.   HENT:      Head: Normocephalic and atraumatic.      Right Ear: External ear normal.      Left Ear: External ear normal.      Nose:      Comments: NG tube in place.   Eyes:      General: No scleral icterus.        Right eye: No discharge.         Left eye: No discharge.      Conjunctiva/sclera: Conjunctivae normal.   Cardiovascular:      Rate and Rhythm: Normal rate and regular rhythm.      Heart sounds: Normal heart sounds. No murmur heard.     No friction rub. No gallop.   Pulmonary:      Effort: Pulmonary effort is normal. No respiratory distress.      Breath sounds: Normal breath sounds. No stridor. No wheezing or rales.   Abdominal:      General: Bowel sounds are absent.   Musculoskeletal:         General: No tenderness. Normal range of motion.   Skin:     General: Skin is warm and dry.   Neurological:      Mental Status: She is alert.      Comments: Oriented in person and place.    Psychiatric:         Mood and Affect: Mood is  depressed. Affect is flat.         Behavior: Behavior is slowed.          Significant Labs: CBC:   Recent Labs   Lab 01/23/25 0325 01/24/25  0337   WBC 4.85 3.89*   HGB 10.5* 9.1*   HCT 32.1* 28.8*   PLT 88* 83*     CMP:   Recent Labs   Lab 01/23/25  0325 01/24/25  0337    147*   K 3.7 3.7    111*   CO2 25 23   * 118*   BUN 44* 41*   CREATININE 0.8 1.0   CALCIUM 7.3* 7.5*   PROT 5.9* 5.7*   ALBUMIN 2.2* 2.1*   BILITOT 0.6 0.5   ALKPHOS 61 49   AST 39 33   ALT 7* <5*   ANIONGAP 9 13     Microbiology Results (last 7 days)       Procedure Component Value Units Date/Time    CSF culture [[id number]] Collected: 01/21/25 1541    Order Status: Completed Specimen: CSF (Spinal Fluid) from CSF Tap, Tube 2 Updated: 01/24/25 1032     CSF CULTURE No Growth to date     Gram Stain Result Cytospin indicates:      Rare WBC's      No organisms seen    Gram stain [[id number]] Collected: 01/21/25 1541    Order Status: Canceled Specimen: CSF (Spinal Fluid) from CSF Tap, Tube 2             Significant Imaging: I have reviewed all pertinent imaging results/findings within the past 24 hours.

## 2025-01-24 NOTE — PLAN OF CARE
1.) If EN is medically warranted, recommend increasing TF of Isosource 1.5 to goal rate of 55ml/hr to provide 1980 kcal, 89g PRO, CHO 222g, and 1026 ml of FF- FWF per MD.      2.) Avoid holding TF for residuals less than 500mL unless associated with other s/s of intolerance such as N/V/D, abd pain/distention.      3.) May provide Chance BID to help aid in wound healing.      4.) Consider to add 500mg of VitC BID to help aid in wound healing.      5.) Consider to add 220mg of zinc sulfate daily, x 30 days to help aid in wound healing.      6.) Continue folic acid and thiamine regimen.      7.) RD to monitor wt, tolerance, skin, labs.

## 2025-01-24 NOTE — PT/OT/SLP EVAL
Occupational Therapy  Co- Evaluation    Name: Tresa Henry  MRN: [id number]  Admitting Diagnosis: Encephalopathy acute  Recent Surgery: * No surgery found *      Recommendations:     Discharge Recommendations: Moderate Intensity Therapy  Discharge Equipment Recommendations:  to be determined by next level of care  Barriers to discharge:  None    Assessment:     Tresa Henry is a 64 y.o. female with a medical diagnosis of Encephalopathy acute.  She presents with decrease functional status secondary to medical diagnosis. Performance deficits affecting function: weakness, impaired endurance, impaired self care skills, impaired functional mobility, impaired cardiopulmonary response to activity, decreased lower extremity function, decreased ROM, impaired cognition, decreased safety awareness. Patient AO x2 with limited command following, decreased initiation, and AMS. Patient scoring 3/15 on BIMS cognitive assessment indicating severe cognitive impairment. Patient UE presenting with 3-/5 strength when formally tested; however, assessment may be inaccurate 2/2 patient mental status. OT will continue to assess patient for functional ability and ambulation performance.    Rehab Prognosis: Good; patient would benefit from acute skilled OT services to address these deficits and reach maximum level of function.       Plan:     Patient to be seen 4 x/week to address the above listed problems via self-care/home management, therapeutic activities, therapeutic exercises, neuromuscular re-education  Plan of Care Expires: 02/24/25  Plan of Care Reviewed with: patient    Subjective     Chief Complaint: Unknown   Patient/Family Comments/goals: DC    Occupational Profile:  Living Environment: Patient reported to therapists she lives alone in an apartment but per PT eval from previous hospital pt lives w/ sister in a [facility] w/ 5 DOE, uncertain if that history was obtained from pt or family, hx will need to be confirmed   Previous  "level of function: Undetermined   Roles and Routines: Unknown   Equipment Used at Home: none  Assistance upon Discharge: Unknown     Pain/Comfort:  Pain Rating 1: 0/10    Patients cultural, spiritual, [employer]yb[employer]ian conflicts given the current situation: no    Objective:     Communicated with: RN prior to session.  Patient found supine with peripheral IV, telemetry, pulse ox (continuous), blood pressure cuff, oxygen, central line, herr catheter upon OT entry to room.    General Precautions: Standard, aspiration, droplet, fall  Orthopedic Precautions: N/A  Braces: N/A  Respiratory Status: Room air    Occupational Performance:    Bed Mobility:    Patient completed Rolling/Turning to Left with  total assistance  Patient completed Rolling/Turning to Right with total assistance  Patient completed Scooting/Bridging with total assistance  Patient completed Supine to Sit with total assistance and 2 persons  Patient completed Sit to Supine with maximal assistance and 2 persons  Patient sat EOB with CGA-max A due to increased posterior lean and fluctuation in patient participation     Functional Mobility/Transfers:  Patient completed Sit <> Stand Transfer with total assistance and of 2 persons  with  no initiation/muscular activation during trial   Functional Mobility: Unable to perform     Activities of Daily Living:  Lower Body Dressing: total assistance to don socks EOB     Cognitive/Visual Perceptual:  Cognitive/Psychosocial Skills:     -       Oriented to: Person and Place   -       Follows Commands/attention:Easily distracted; follows some commands   -       Communication: clear/fluent; patient unable to remain on topic   -       Safety awareness/insight to disability: impaired   Visual/Perceptual:      -Intact     Brief Interview of Mental Status (BIMS):   Repetition of 3 words: "Sock, Blue, Bed": 3/3  3- Three  Temporal Orientation: 0/6         Able to report correct year: 0 - Missed by > 5 years  Able to report correct " "month: 0- Missed by > 1 month  Able to report correct day of the week: 0- Incorrect or no answer   Recall: 0/6  Able to recall "Sock": 0-  No, could not recall   Able to recall "Blue": 0- No, could not recall  Able to recall "Bed": 0- No, could not recall  Summary score: 3/15  13-15: Cognitively Intact  8-12: Moderately Impaired   0-7: Severe Impairment     Physical Exam:  Upper Extremity Range of Motion:     -       Right Upper Extremity: Proximally limited   -       Left Upper Extremity: Proximally limited   Upper Extremity Strength:    -       Right Upper Extremity: 3-/5  -       Left Upper Extremity: 3-/5   Strength:    -       Right Upper Extremity: significant impairment   -       Left Upper Extremity: significant impairment   Fine Motor Coordination: significant impairment     AMPAC 6 Click ADL:  AMPAC Total Score: 6    Treatment & Education:  Co-evaluation completed due to patient medical instability and to ensure patient safety. OT provided education on home recommendations and fall prevention in preparation for D/C. Provided education regarding role of OT, POC, & discharge recommendations.  Pt with no further questions/concerns at this time.     Patient left supine with all lines intact and call button in reach    GOALS:   Multidisciplinary Problems       Occupational Therapy Goals          Problem: Occupational Therapy    Goal Priority Disciplines Outcome Interventions   Occupational Therapy Goal     OT, PT/OT Progressing    Description: Goals to be met by: 2/24/25     Patient will increase functional independence with ADLs by performing:    UE Dressing with Moderate Assistance.  LE Dressing with Moderate Assistance.  Grooming while EOB with Moderate Assistance.  Toileting from bedside commode with Moderate Assistance for hygiene and clothing management.   Sitting at edge of bed x15 minutes with Moderate Assistance.  Supine to sit with Moderate Assistance.  Stand pivot transfers with Maximum " Assistance.  Step transfer with Total Assistance  Toilet transfer to bedside commode with Maximum Assistance.                         DME Justifications:  No DME recommended requiring DME justifications    History:     Past Medical History:   Diagnosis Date    Addiction to drug 1984    [facility]    Anxiety 11/1984    [facility].     Bipolar 1 disorder     Depression [address] [place] [address].     Encounter for blood transfusion     Hallucination 2018    Tactile Hallucinations.     History of psychiatric hospitalization 1984    Patient stated she has been hosptialiized 20 times.     History of psychiatric hospitalization 2018    3/7/18 [facility]    History of psychiatric hospitalization 2019    2/20/19; 4/9/19; 7/31/19 9/23/19 [facility]    Hx of psychiatric care 2015    Tegretal, Topamax, Thorazin, cogentin, Lithium     Liver disease     Psychiatric problem 1990    Anxiety (generalized anxiety disorder.)    Schizophrenia 1984    [facility]    Seizures     Substance abuse     Suicide attempt 11/01/1984    Therapy 2000    [facility]    Weakness 03/09/2021    Withdrawal symptoms, drug or narcotic          Past Surgical History:   Procedure Laterality Date    COLON RESECTION      COLONOSCOPY N/A 11/14/2022    Procedure: COLONOSCOPY;  Surgeon: Maggie Arriaza MD;  Location: [facility];  Service: Endoscopy;  Laterality: N/A;    COLONOSCOPY N/A 03/05/2024    Procedure: COLONOSCOPY;  Surgeon: Amadeo Crawford MD;  Location: [facility];  Service: General;  Laterality: N/A;    ESOPHAGOGASTRODUODENOSCOPY N/A 11/14/2022    Procedure: EGD (ESOPHAGOGASTRODUODENOSCOPY);  Surgeon: Maggie Arriaza MD;  Location: [facility];  Service: Endoscopy;  Laterality: N/A;    HYSTERECTOMY         Time Tracking:     OT Date of Treatment: 01/24/25  OT Start Time: 1137  OT Stop Time: 1207  OT Total Time (min): 30 min    Billable Minutes:Evaluation  7  Therapeutic Activity 13  Neuromuscular Re-education 10    1/24/2025

## 2025-01-25 PROBLEM — E27.40 ADRENAL INSUFFICIENCY: Status: ACTIVE | Noted: 2025-01-25

## 2025-01-25 LAB
ALBUMIN SERPL BCP-MCNC: 2.2 G/DL (ref 3.5–5.2)
ALP SERPL-CCNC: 54 U/L (ref 40–150)
ALT SERPL W/O P-5'-P-CCNC: <5 U/L (ref 10–44)
ANION GAP SERPL CALC-SCNC: 8 MMOL/L (ref 8–16)
ASCENDING AORTA: 3.47 CM
AST SERPL-CCNC: 33 U/L (ref 10–40)
AV AREA BY CONTINUOUS VTI: 3.5 CM2
AV INDEX (PROSTH): 1.03
AV LVOT MEAN GRADIENT: 3 MMHG
AV LVOT PEAK GRADIENT: 6 MMHG
AV MEAN GRADIENT: 4 MMHG
AV PEAK GRADIENT: 8 MMHG
AV VALVE AREA BY VELOCITY RATIO: 3 CM²
AV VALVE AREA: 3.6 CM2
AV VELOCITY RATIO: 0.86
BASOPHILS # BLD AUTO: 0 K/UL (ref 0–0.2)
BASOPHILS NFR BLD: 0 % (ref 0–1.9)
BILIRUB SERPL-MCNC: 0.5 MG/DL (ref 0.1–1)
BSA FOR ECHO PROCEDURE: 1.74 M2
BUN SERPL-MCNC: 40 MG/DL (ref 8–23)
CALCIUM SERPL-MCNC: 7.6 MG/DL (ref 8.7–10.5)
CHLORIDE SERPL-SCNC: 108 MMOL/L (ref 95–110)
CO2 SERPL-SCNC: 24 MMOL/L (ref 23–29)
CREAT SERPL-MCNC: 0.8 MG/DL (ref 0.5–1.4)
CV ECHO LV RWT: 0.54 CM
DIFFERENTIAL METHOD BLD: ABNORMAL
DOP CALC AO PEAK VEL: 1.4 M/S
DOP CALC AO VTI: 24.6 CM
DOP CALC LVOT AREA: 3.5 CM2
DOP CALC LVOT DIAMETER: 2.1 CM
DOP CALC LVOT PEAK VEL: 1.2 M/S
DOP CALC LVOT STROKE VOLUME: 87.9 CM3
DOP CALCLVOT PEAK VEL VTI: 25.4 CM
E WAVE DECELERATION TIME: 211 MS
E/A RATIO: 0.77
E/E' RATIO: 13 M/S
ECHO EF ESTIMATED: 58 %
ECHO LV POSTERIOR WALL: 1 CM (ref 0.6–1.1)
EOSINOPHIL # BLD AUTO: 0 K/UL (ref 0–0.5)
EOSINOPHIL NFR BLD: 0 % (ref 0–8)
ERYTHROCYTE [DISTWIDTH] IN BLOOD BY AUTOMATED COUNT: 14.2 % (ref 11.5–14.5)
EST. GFR  (NO RACE VARIABLE): >60 ML/MIN/1.73 M^2
FRACTIONAL SHORTENING: 29.7 % (ref 28–44)
GLUCOSE SERPL-MCNC: 117 MG/DL (ref 70–110)
HCT VFR BLD AUTO: 27.1 % (ref 37–48.5)
HGB BLD-MCNC: 9 G/DL (ref 12–16)
HSV1, PCR, CSF: NEGATIVE
HSV2, PCR, CSF: NEGATIVE
IMM GRANULOCYTES # BLD AUTO: 0.02 K/UL (ref 0–0.04)
IMM GRANULOCYTES NFR BLD AUTO: 0.6 % (ref 0–0.5)
INTERVENTRICULAR SEPTUM: 1.3 CM (ref 0.6–1.1)
LA MAJOR: 4.6 CM
LA MINOR: 5 CM
LA WIDTH: 4.4 CM
LEFT ATRIUM SIZE: 2.9 CM
LEFT ATRIUM VOLUME INDEX MOD: 36 ML/M2
LEFT ATRIUM VOLUME INDEX: 30 ML/M2
LEFT ATRIUM VOLUME MOD: 62 ML
LEFT ATRIUM VOLUME: 52 CM3
LEFT INTERNAL DIMENSION IN SYSTOLE: 2.6 CM (ref 2.1–4)
LEFT VENTRICLE DIASTOLIC VOLUME INDEX: 33.26 ML/M2
LEFT VENTRICLE DIASTOLIC VOLUME: 57.88 ML
LEFT VENTRICLE MASS INDEX: 79.4 G/M2
LEFT VENTRICLE SYSTOLIC VOLUME INDEX: 13.8 ML/M2
LEFT VENTRICLE SYSTOLIC VOLUME: 24.03 ML
LEFT VENTRICULAR INTERNAL DIMENSION IN DIASTOLE: 3.7 CM (ref 3.5–6)
LEFT VENTRICULAR MASS: 138.2 G
LV LATERAL E/E' RATIO: 12.5
LV SEPTAL E/E' RATIO: 12.5
LYMPHOCYTES # BLD AUTO: 0.4 K/UL (ref 1–4.8)
LYMPHOCYTES NFR BLD: 13 % (ref 18–48)
MAGNESIUM SERPL-MCNC: 1.6 MG/DL (ref 1.6–2.6)
MCH RBC QN AUTO: 29.6 PG (ref 27–31)
MCHC RBC AUTO-ENTMCNC: 33.2 G/DL (ref 32–36)
MCV RBC AUTO: 89 FL (ref 82–98)
MONOCYTES # BLD AUTO: 0.2 K/UL (ref 0.3–1)
MONOCYTES NFR BLD: 6.9 % (ref 4–15)
MV A" WAVE DURATION": 117.98 MS
MV PEAK A VEL: 0.97 M/S
MV PEAK E VEL: 0.75 M/S
NEUTROPHILS # BLD AUTO: 2.6 K/UL (ref 1.8–7.7)
NEUTROPHILS NFR BLD: 79.5 % (ref 38–73)
NRBC BLD-RTO: 0 /100 WBC
OHS CV RV/LV RATIO: 0.73 CM
PHOSPHATE SERPL-MCNC: 3.2 MG/DL (ref 2.7–4.5)
PISA TR MAX VEL: 2.8 M/S
PLATELET # BLD AUTO: 87 K/UL (ref 150–450)
PMV BLD AUTO: 10.9 FL (ref 9.2–12.9)
POTASSIUM SERPL-SCNC: 2.9 MMOL/L (ref 3.5–5.1)
PROT SERPL-MCNC: 5.7 G/DL (ref 6–8.4)
PULM VEIN A" WAVE DURATION": 117.98 MS
PULM VEIN S/D RATIO: 1.97
PULMONIC VEIN PEAK A VELOCITY: 0.3 M/S
PV PEAK D VEL: 0.38 M/S
PV PEAK S VEL: 0.75 M/S
RA MAJOR: 4.1 CM
RA PRESSURE ESTIMATED: 3 MMHG
RA WIDTH: 2.79 CM
RBC # BLD AUTO: 3.04 M/UL (ref 4–5.4)
RIGHT VENTRICLE DIASTOLIC BASEL DIMENSION: 2.7 CM
RV TB RVSP: 6 MMHG
SINUS: 3.18 CM
SODIUM SERPL-SCNC: 140 MMOL/L (ref 136–145)
STJ: 2.55 CM
TDI LATERAL: 0.06 M/S
TDI SEPTAL: 0.06 M/S
TDI: 0.06 M/S
TR MAX PG: 31 MMHG
TRICUSPID ANNULAR PLANE SYSTOLIC EXCURSION: 1.9 CM
TV PEAK GRADIENT: 32 MMHG
TV REST PULMONARY ARTERY PRESSURE: 34 MMHG
WBC # BLD AUTO: 3.31 K/UL (ref 3.9–12.7)
Z-SCORE OF LEFT VENTRICULAR DIMENSION IN END DIASTOLE: -2.64
Z-SCORE OF LEFT VENTRICULAR DIMENSION IN END SYSTOLE: -1.08

## 2025-01-25 PROCEDURE — 25000003 PHARM REV CODE 250: Performed by: INTERNAL MEDICINE

## 2025-01-25 PROCEDURE — 25000003 PHARM REV CODE 250

## 2025-01-25 PROCEDURE — 63600175 PHARM REV CODE 636 W HCPCS

## 2025-01-25 PROCEDURE — 85025 COMPLETE CBC W/AUTO DIFF WBC: CPT

## 2025-01-25 PROCEDURE — 84100 ASSAY OF PHOSPHORUS: CPT

## 2025-01-25 PROCEDURE — 83735 ASSAY OF MAGNESIUM: CPT

## 2025-01-25 PROCEDURE — 36415 COLL VENOUS BLD VENIPUNCTURE: CPT

## 2025-01-25 PROCEDURE — 80053 COMPREHEN METABOLIC PANEL: CPT

## 2025-01-25 PROCEDURE — 11000001 HC ACUTE MED/SURG PRIVATE ROOM

## 2025-01-25 RX ORDER — HYDROCORTISONE 5 MG/1
10 TABLET ORAL 2 TIMES DAILY
Status: DISCONTINUED | OUTPATIENT
Start: 2025-01-31 | End: 2025-01-27

## 2025-01-25 RX ORDER — LEVETIRACETAM 500 MG/1
500 TABLET ORAL 2 TIMES DAILY
Status: DISCONTINUED | OUTPATIENT
Start: 2025-01-25 | End: 2025-01-27

## 2025-01-25 RX ORDER — HYDROCORTISONE 20 MG/1
20 TABLET ORAL 2 TIMES DAILY
Status: DISCONTINUED | OUTPATIENT
Start: 2025-01-27 | End: 2025-01-27

## 2025-01-25 RX ORDER — HYDROCORTISONE 5 MG/1
15 TABLET ORAL 2 TIMES DAILY
Status: DISCONTINUED | OUTPATIENT
Start: 2025-01-29 | End: 2025-01-27

## 2025-01-25 RX ORDER — HYDROCODONE BITARTRATE AND ACETAMINOPHEN 5; 325 MG/1; MG/1
1 TABLET ORAL EVERY 6 HOURS PRN
Status: DISCONTINUED | OUTPATIENT
Start: 2025-01-25 | End: 2025-01-27

## 2025-01-25 RX ORDER — HYDROCORTISONE 5 MG/1
5 TABLET ORAL 2 TIMES DAILY
Status: DISCONTINUED | OUTPATIENT
Start: 2025-02-02 | End: 2025-01-27

## 2025-01-25 RX ORDER — THIAMINE HCL 100 MG
100 TABLET ORAL DAILY
Status: DISCONTINUED | OUTPATIENT
Start: 2025-01-26 | End: 2025-01-30 | Stop reason: HOSPADM

## 2025-01-25 RX ADMIN — FOLIC ACID 1 MG: 5 INJECTION, SOLUTION INTRAMUSCULAR; INTRAVENOUS; SUBCUTANEOUS at 08:01

## 2025-01-25 RX ADMIN — CHLORHEXIDINE GLUCONATE 0.12% ORAL RINSE 15 ML: 1.2 LIQUID ORAL at 09:01

## 2025-01-25 RX ADMIN — LEVETIRACETAM 500 MG: 100 INJECTION INTRAVENOUS at 09:01

## 2025-01-25 RX ADMIN — PANTOPRAZOLE SODIUM 40 MG: 40 INJECTION, POWDER, FOR SOLUTION INTRAVENOUS at 09:01

## 2025-01-25 RX ADMIN — OLANZAPINE 5 MG: 5 TABLET, ORALLY DISINTEGRATING ORAL at 09:01

## 2025-01-25 RX ADMIN — LEVETIRACETAM 500 MG: 500 TABLET, FILM COATED ORAL at 09:01

## 2025-01-25 RX ADMIN — HYDROCORTISONE 25 MG: 5 TABLET ORAL at 09:01

## 2025-01-25 RX ADMIN — CEFAZOLIN 2 G: 2 INJECTION, POWDER, FOR SOLUTION INTRAMUSCULAR; INTRAVENOUS at 01:01

## 2025-01-25 RX ADMIN — CEFAZOLIN 2 G: 2 INJECTION, POWDER, FOR SOLUTION INTRAMUSCULAR; INTRAVENOUS at 03:01

## 2025-01-25 RX ADMIN — MUPIROCIN: 20 OINTMENT TOPICAL at 09:01

## 2025-01-25 RX ADMIN — ACETAMINOPHEN 650 MG: 325 TABLET ORAL at 04:01

## 2025-01-25 RX ADMIN — THIAMINE HYDROCHLORIDE 100 MG: 100 INJECTION, SOLUTION INTRAMUSCULAR; INTRAVENOUS at 09:01

## 2025-01-25 RX ADMIN — HYDROCORTISONE SODIUM SUCCINATE 50 MG: 100 INJECTION, POWDER, FOR SOLUTION INTRAMUSCULAR; INTRAVENOUS at 08:01

## 2025-01-25 RX ADMIN — ENOXAPARIN SODIUM 40 MG: 40 INJECTION SUBCUTANEOUS at 04:01

## 2025-01-25 RX ADMIN — BENZTROPINE MESYLATE 1 MG: 1 TABLET ORAL at 09:01

## 2025-01-25 NOTE — HPI
"A 64-year-old woman with colon adenocarcinoma s/p resection with right hemicolectomy, chronic HCV, depression, bipolar 1 disorder, and history of substance use who originally presented to [facility] with encephalopathy. She was admitted and diagnosed with multifocal pneumonia and HUSEYIN. Work up subsequently revealed influenza A infection and MSSA bacteremia. Her hospital course was further complicated by large right sided pneumothorax requiring chest tube placement, hyponatremia, neutropenia and thrombocytopenia. She was subsequently transferred to [facility] due to ongoing encephalopathy of unclear etiology.     Infectious Diseases is consulted on 1/24 for "RPR & positive FTA-Abs seemingly untreated. MSSA bacteremia on ancef"        "

## 2025-01-25 NOTE — ASSESSMENT & PLAN NOTE
Relative adrenal insufficiency  Hydrocortisone started during sepsis three weeks ago  Wean off over a weak    Will not treat associated elevated blood pressures

## 2025-01-25 NOTE — PROGRESS NOTES
Hospital Medicine  Progress note    Team: [facility] MED R Krista León MD  Admit Date: 1/20/2025  Code status: Full Code    Principal Problem:  Encephalopathy acute    Interval hx:  have some pain with elevated blood pressures    PEx  Temp:  [97.4 °F (36.3 °C)-99.3 °F (37.4 °C)]   Pulse:  []   Resp:  [17-18]   BP: (158-197)/()   SpO2:  [95 %-98 %]     Intake/Output Summary (Last 24 hours) at 1/25/2025 1659  Last data filed at 1/24/2025 1722  Gross per 24 hour   Intake --   Output 1100 ml   Net -1100 ml       General Appearance: no acute distress, WD, chronically ill-appeaing  Heart: regular rate and rhythm, no heave  Respiratory: Normal respiratory effort, symmetric excursion, bilateral vesicular breath sounds   Abdomen: Soft, non-tender; bowel sounds active  Skin: intact, no rash, no ulcers  Neurologic:  No focal numbness or weakness  Mental status: Alert, oriented to name only, affect appropriate    Recent Labs   Lab 01/23/25 0325 01/24/25 0337 01/25/25  1015   WBC 4.85 3.89* 3.31*   HGB 10.5* 9.1* 9.0*   HCT 32.1* 28.8* 27.1*   PLT 88* 83* 87*     Recent Labs   Lab 01/23/25 0325 01/24/25 0337 01/25/25  1015    147* 140   K 3.7 3.7 2.9*    111* 108   CO2 25 23 24   BUN 44* 41* 40*   CREATININE 0.8 1.0 0.8   * 118* 117*   CALCIUM 7.3* 7.5* 7.6*   MG 1.8 1.7 1.6   PHOS 2.7 2.8 3.2     Recent Labs   Lab 01/23/25 0325 01/24/25 0337 01/25/25  1015   ALKPHOS 61 49 54   ALT 7* <5* <5*   AST 39 33 33   ALBUMIN 2.2* 2.1* 2.2*   PROT 5.9* 5.7* 5.7*   BILITOT 0.6 0.5 0.5        Scheduled Meds:   benztropine  1 mg Per NG tube BID    chlorhexidine  15 mL Mouth/Throat BID    enoxparin  40 mg Subcutaneous Q24H (prophylaxis, 1700)    [START ON 1/26/2025] folic acid-vit B6-vit B12 2.5-25-2 mg  1 tablet Oral Daily    hydrocortisone  25 mg Oral BID    Followed by    [START ON 1/27/2025] hydrocortisone  20 mg Oral BID    Followed by    [START ON 1/29/2025] hydrocortisone  15 mg Oral BID     Followed by    [START ON 1/31/2025] hydrocortisone  10 mg Oral BID    Followed by    [START ON 2/2/2025] hydrocortisone  5 mg Oral BID    levETIRAcetam  500 mg Oral BID    mupirocin   Nasal BID    OLANZapine zydis  5 mg Per NG tube Daily    thiamine (B-1) injection  100 mg Intravenous Daily    [START ON 1/26/2025] thiamine  100 mg Oral Daily     Continuous Infusions:  As Needed:    Current Facility-Administered Medications:     0.9%  NaCl infusion (for blood administration), , Intravenous, Q24H PRN    acetaminophen, 650 mg, Per NG tube, Q8H PRN    dextrose 50%, 12.5 g, Intravenous, PRN    dextrose 50%, 25 g, Intravenous, PRN    glucagon (human recombinant), 1 mg, Intramuscular, PRN    glucose, 16 g, Per NG tube, PRN    glucose, 24 g, Per NG tube, PRN    naloxone, 0.02 mg, Intravenous, PRN    sodium chloride 0.9%, 10 mL, Intravenous, Q12H PRN    Assessment and Plan  / Problems managed today    * Encephalopathy acute  Found by EMS with high suspicion for substance use. UDS THC presumed positive. CT head without acute intracranial pathology. HIV hegative, RPR positive. Hx of untreated Hep C. Collateral history provided by sister in the chart reports pt with history of worsening dementia however current mentation is not baseline. Prior to admission, sister reports that patient experience cough and fevers. Likely multifactorial: metabolic secondary to electrolyte disturbances, sepsis and/or substance abuse. Likely compounded by delirium given prolonged critical admission. Neurology consulted, agree with multifactorial etiology. Will f/u in clinic at [place]. Mentation improving, though still oriented x1 only.     - neurochecks  - aspiration, delirium and fall precautions  - Continued correction of metabolic parameters, including azotemia, hyperammonia, hypocalcemia and hypernatremia (now resolved)  - ID consulted for possible syphilis tx   - Delirium precautions  - empiric high dose IV thiamine for 5 days, followed by 100mg  po    Adrenal insufficiency  Started during sepsis three weeks ago  Wean off over a weak    Will not treat associated elevated blood pressures    Paralysis of right common peroneal nerve  -- will f/u in neuro clinic at [place]       Tobacco dependency  Dangers of cigarette smoking were reviewed with patient in detail. Patient was Counseled for 3-10 minutes. Nicotine replacement options were discussed. Nicotine replacement was discussed- not prescribed per patient's request    Delirium  Patient with acute delirium. There is no specific treatment. Will avoid narcotics/benzos that are known to worsen condition and add PRN antipsychotics to limit behaviors of self harm. Monitor closely.  Fall and delirium precautions.     Hypernatremia  Hypernatremia is likely due to Dehydration. The patient's most recent sodium results are listed below.  Recent Labs     01/22/25  0353 01/23/25  0325 01/24/25  0337    140 147*       Plan  - Aim to correct the sodium by 8-10mEq in 24 hours.   - Will plan to trend the patient's sodium: Daily  - The patient's hypernatremia is stable  - encourage PO now that patient is more awake. Can give additional IVF if continued hypernatremia despite PO intake     Secondary spontaneous pneumothorax  S/p chest tube placement and adequate re-expansion on repeat CXR. Etiology of pneumothorax potentially from substance inhalation causing lung damage overtime. CT c/a/p 10/2024 with evidence of aerated lungs; however severe lung damage with evidence of ephysema and opacification noted on most recent CT.     MSSA bacteremia  Bxc 01/05 MSSA. Was on Vanc and Zosyn; however transitioned to Vanc and Meropenem due to thrombocytopenia per ID.      - on ancef at last hospital, cont for now   - f/u echo  - ID consulted, appreciate recs     Influenza A with pneumonia  RIP 01/05 positive for influenza A. Completed Tamiflu.     Substance abuse  C/f polysubstance abuse. UDS 01/05 presumed positive for THC. Completed  Klonopin taper per Psych at OSH for potential benzo withdrawal. On empiric IV thiamine with planned transition to PO     Sepsis with acute organ dysfunction  Strep pneumo urine antigen positive   MSSA bacteremia; follow-up Bcx cleared  Influenza A positive  UA negative for infectious etiology     Placed on Vanc and Zosyn --> Cefazolin and Zosyn --> Vanc and Rm (changed per ID due to thrombocytopenia and spiking fevers on 01/11). Completed Tamiflu. ID deferred treatment of the maame dubliniensis respiratory culture.     - f/u TTE   Antibiotics (72h ago, onward)      Start     Stop Route Frequency Ordered    01/23/25 2100  mupirocin 2 % ointment         01/28/25 2059 Nasl 2 times daily 01/23/25 1532    01/20/25 2000  ceFAZolin 2 g         -- IV Every 8 hours (non-standard times) 01/20/25 1452          -- ID consulted, appreciate recs     Colon cancer  -h/o noted           Diet:  dental soft  GI PPx: not needed  DVT PPx:  enoxaparin  Airways: room air  Wounds: none    Goals of Care:  Return to prior functional status     Discharge Planning   NICK: 2/5/2025   Is the patient medically ready for discharge?:     Reason for patient still in hospital (select all that apply): Patient trending condition and Treatment  Discharge Plan A: Rehab        Krista León MD

## 2025-01-25 NOTE — ASSESSMENT & PLAN NOTE
Patient with MSSA bacteremia diagnosed on 1/5 at [facility]. Blood cultures sterile on 1/7. Bacteremia suspect to be a secondary bacterial infection in the setting of influenza A.  Patient has completed at least 2 weeks of antibiotic therapy.  Can likely stop cefazolin.  If concern for endocarditis then would recommend at least a TTE.

## 2025-01-25 NOTE — PLAN OF CARE
Problem: Adult Inpatient Plan of Care  Goal: Plan of Care Review  Outcome: Progressing  Goal: Readiness for Transition of Care  Outcome: Progressing     Problem: Pneumonia  Goal: Effective Oxygenation and Ventilation  Outcome: Progressing     Problem: Wound  Goal: Skin Health and Integrity  Outcome: Progressing     Problem: Skin Injury Risk Increased  Goal: Skin Health and Integrity  Outcome: Progressing     Problem: Fall Injury Risk  Goal: Absence of Fall and Fall-Related Injury  Outcome: Progressing     Problem: Infection  Goal: Absence of Infection Signs and Symptoms  Outcome: Progressing

## 2025-01-25 NOTE — CLINICAL REVIEW
Sepsis Screen (most recent)       Sepsis Screen (IP) - 01/25/25 2084       Is the patient's history or complaint suggestive of a possible infection? Yes  -    Are there at least two of the following signs and symptoms present? Yes  -    Sepsis signs/symptoms - Tachycardia Tachycardia     >90  -    Sepsis signs/symptoms - WBC WBC < 4,000 or WBC > 12,000  -    Sepsis signs/symptoms - Altered Mental Status Altered Mental Status  -    Are any of the following organ dysfunction criteria present and not considered to be due to a chronic condition? Yes  -    Organ Dysfunction Criteria Total Bilirubin > 2.0 Platelet count < 100,000  -    Initiate Sepsis Protocol No  -    Reason sepsis not considered Pt. receiving appropriate management  -              User Key  (r) = Recorded By, (t) = Taken By, (c) = Cosigned By      Initials Name    Lillian Oreilly RN

## 2025-01-25 NOTE — ASSESSMENT & PLAN NOTE
I have reviewed hospital notes from   service and other specialty providers. I have also reviewed CBC, CMP/BMP,  cultures and imaging with my interpretation as documented.     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 Per discussion with patient she was treated in the [facility]. Due to the current weather event I was unable to confirm if patient was treated in the past. I was also not able to confirm with the [facility] department STI department whether patient has been treated. She underwent a diagnostic lumbar puncture which failed to show any abnormalities.  Patient not opposed to re-treatment.  If no concern for neurosyphilis then would re-treat with penicillin G benzathine 2.4 million units once per week for 3 weeks.   If concern for neurosyphilis then can consider therapy with aqueous penicillin G vs doxycycline 100 mg PO BID for 28 days.   If not wanting to pursue treatment can call [facility] Dept on Monday to confirm if patient has been treated in past and decide next best steps.

## 2025-01-25 NOTE — SUBJECTIVE & OBJECTIVE
Past Medical History:   Diagnosis Date    Addiction to drug 1984    [facility]    Anxiety 11/1984    [facility].     Bipolar 1 disorder     Depression 1984    [facility] in [place].     Encounter for blood transfusion     Hallucination 2018    [facility].     History of psychiatric hospitalization 1984    Patient stated she has been hosptialiized 20 times.     History of psychiatric hospitalization 2018    3/7/18 [facility]    History of psychiatric hospitalization 2019    2/20/19; 4/9/19; 7/31/19 9/23/19 [facility]    Hx of psychiatric care 2015    Tegretal, Topamax, Thorazin, cogentin, Lithium     Liver disease     Psychiatric problem 1990    Anxiety (generalized anxiety disorder.)    Schizophrenia 1984    [facility]    Seizures     Substance abuse     Suicide attempt 11/01/1984    Therapy 2000    [facility]    Weakness 03/09/2021    Withdrawal symptoms, drug or narcotic        Past Surgical History:   Procedure Laterality Date    COLON RESECTION      COLONOSCOPY N/A 11/14/2022    Procedure: COLONOSCOPY;  Surgeon: Maggie Arriaza MD;  Location: [facility];  Service: Endoscopy;  Laterality: N/A;    COLONOSCOPY N/A 03/05/2024    Procedure: COLONOSCOPY;  Surgeon: Amadeo Crawford MD;  Location: [facility];  Service: General;  Laterality: N/A;    ESOPHAGOGASTRODUODENOSCOPY N/A 11/14/2022    Procedure: EGD (ESOPHAGOGASTRODUODENOSCOPY);  Surgeon: Maggie Arriaza MD;  Location: [facility];  Service: Endoscopy;  Laterality: N/A;    HYSTERECTOMY         Review of patient's allergies indicates:   Allergen Reactions    Haldol [haloperidol lactate] Hallucinations    Divalproex Rash    Risperdal [risperidone] Palpitations       Medications:  Medications Prior to Admission   Medication Sig    atorvastatin (LIPITOR) 10 MG tablet TAKE ONE TABLET BY MOUTH ONCE DAILY FOR CHOLESTEROL    benztropine (COGENTIN) 1 MG tablet Take 1 mg by  mouth 2 (two) times daily.    carBAMazepine (TEGRETOL) 200 mg tablet Take 200 mg by mouth 2 (two) times a day.    chlorproMAZINE (THORAZINE) 100 MG tablet Take 200 mg by mouth every evening.     clonazePAM (KLONOPIN) 1 MG tablet Take 1 mg by mouth 2 (two) times daily.    ibuprofen (ADVIL,MOTRIN) 800 MG tablet TAKE ONE TABLET BY MOUTH EVERY 8 HOURS WITH FOOD or milk AS NEEDED FOR PAIN    levETIRAcetam (KEPPRA) 500 MG Tab TAKE ONE TABLET BY MOUTH 2 TIMES A DAY    LINZESS 290 mcg Cap capsule Take 290 mcg by mouth.    lurasidone (LATUDA) 80 mg Tab tablet Take 40 mg by mouth 2 (two) times a day.    prazosin (MINIPRESS) 1 MG Cap Take 1 mg by mouth 2 (two) times daily.     Antibiotics (From admission, onward)      Start     Stop Route Frequency Ordered    01/23/25 2100  mupirocin 2 % ointment         01/28/25 2059 Nasl 2 times daily 01/23/25 1532    01/20/25 2000  ceFAZolin 2 g         -- IV Every 8 hours (non-standard times) 01/20/25 1452          Antifungals (From admission, onward)      None          Antivirals (From admission, onward)      None             Immunization History   Administered Date(s) Administered    Tdap 09/20/2019       Family History       Problem Relation (Age of Onset)    Liver cancer Brother    No Known Problems Father, Sister, Maternal Aunt    Schizophrenia Mother          Social History     Socioeconomic History    Marital status: Single    Number of children: 5   Tobacco Use    Smoking status: Every Day     Current packs/day: 0.50     Average packs/day: 0.5 packs/day for 40.1 years (20.0 ttl pk-yrs)     Types: Cigarettes     Start date: 1975     Last attempt to quit: 12/21/2012     Passive exposure: Never    Smokeless tobacco: Never    Tobacco comments:     Pt declined smoking cessation services at this time 12/21/2023, updated smoking status   Substance and Sexual Activity    Alcohol use: No    Drug use: Not Currently     Types: Marijuana, Amphetamines    Sexual activity: Not Currently      Partners: Male   Other Topics Concern    Patient feels they ought to cut down on drinking/drug use No    Patient annoyed by others criticizing their drinking/drug use No    Patient has felt bad or guilty about drinking/drug use No    Patient has had a drink/used drugs as an eye opener in the AM No     Social Drivers of Health     Financial Resource Strain: Low Risk  (1/23/2025)    Overall Financial Resource Strain (CARDIA)     Difficulty of Paying Living Expenses: Not very hard   Food Insecurity: No Food Insecurity (1/23/2025)    Hunger Vital Sign     Worried About Running Out of Food in the Last Year: Never true     Ran Out of Food in the Last Year: Never true   Transportation Needs: No Transportation Needs (1/23/2025)    TRANSPORTATION NEEDS     Transportation : No   Physical Activity: Inactive (1/6/2025)    Exercise Vital Sign     Days of Exercise per Week: 0 days     Minutes of Exercise per Session: 0 min   Stress: No Stress Concern Present (1/23/2025)    [employer] [place] [facility] [employer] - Occupational Stress Questionnaire     Feeling of Stress : Not at all   Housing Stability: Low Risk  (1/23/2025)    Housing Stability Vital Sign     Unable to Pay for Housing in the Last Year: No     Homeless in the Last Year: No     Review of Systems   Constitutional:  Negative for chills, fatigue, fever and unexpected weight change.   HENT:  Negative for ear pain, facial swelling, hearing loss, mouth sores, nosebleeds, rhinorrhea, sinus pressure, sore throat, tinnitus, trouble swallowing and voice change.    Eyes:  Negative for photophobia, pain, redness and visual disturbance.   Respiratory:  Negative for cough, chest tightness, shortness of breath and wheezing.    Cardiovascular:  Negative for chest pain, palpitations and leg swelling.   Gastrointestinal:  Negative for abdominal pain, blood in stool, constipation, diarrhea, nausea and vomiting.   Endocrine: Negative for cold intolerance, heat intolerance,  polydipsia, polyphagia and polyuria.   Genitourinary:  Negative for decreased urine volume, dysuria, flank pain, frequency, hematuria, menstrual problem, urgency, vaginal bleeding, vaginal discharge and vaginal pain.   Musculoskeletal:  Negative for arthralgias, back pain, joint swelling, myalgias and neck pain.   Skin:  Negative for rash.   Allergic/Immunologic: Negative for environmental allergies, food allergies and immunocompromised state.   Neurological:  Negative for dizziness, seizures, syncope, weakness, light-headedness, numbness and headaches.   Hematological:  Negative for adenopathy. Does not bruise/bleed easily.   Psychiatric/Behavioral:  Positive for confusion. Negative for hallucinations, self-injury, sleep disturbance and suicidal ideas. The patient is not nervous/anxious.      Objective:     Vital Signs (Most Recent):  Temp: 99.3 °F (37.4 °C) (01/24/25 1921)  Pulse: 98 (01/24/25 1922)  Resp: 18 (01/24/25 1921)  BP: (!) 163/95 (01/24/25 1922)  SpO2: 98 % (01/24/25 1922) Vital Signs (24h Range):  Temp:  [98.4 °F (36.9 °C)-99.3 °F (37.4 °C)] 99.3 °F (37.4 °C)  Pulse:  [] 98  Resp:  [18] 18  SpO2:  [98 %-100 %] 98 %  BP: (131-166)/(76-95) 163/95     Weight: 64.1 kg (141 lb 5 oz)  Body mass index is 22.13 kg/m².    Estimated Creatinine Clearance: 55.3 mL/min (based on SCr of 1 mg/dL).     Physical Exam  Vitals and nursing note reviewed.   Constitutional:       Appearance: She is well-developed.   HENT:      Head: Normocephalic and atraumatic.      Right Ear: External ear normal.      Left Ear: External ear normal.      Nose:      Comments: NG tube in place.   Eyes:      General: No scleral icterus.        Right eye: No discharge.         Left eye: No discharge.      Conjunctiva/sclera: Conjunctivae normal.   Cardiovascular:      Rate and Rhythm: Normal rate and regular rhythm.      Heart sounds: Normal heart sounds. No murmur heard.     No friction rub. No gallop.   Pulmonary:      Effort:  Pulmonary effort is normal. No respiratory distress.      Breath sounds: Normal breath sounds. No stridor. No wheezing or rales.   Abdominal:      General: Bowel sounds are absent.   Musculoskeletal:         General: No tenderness. Normal range of motion.   Skin:     General: Skin is warm and dry.   Neurological:      Mental Status: She is alert.      Comments: Oriented in person and place.    Psychiatric:         Mood and Affect: Mood is depressed. Affect is flat.         Behavior: Behavior is slowed.          Significant Labs: CBC:   Recent Labs   Lab 01/23/25 0325 01/24/25 0337   WBC 4.85 3.89*   HGB 10.5* 9.1*   HCT 32.1* 28.8*   PLT 88* 83*     CMP:   Recent Labs   Lab 01/23/25 0325 01/24/25 0337    147*   K 3.7 3.7    111*   CO2 25 23   * 118*   BUN 44* 41*   CREATININE 0.8 1.0   CALCIUM 7.3* 7.5*   PROT 5.9* 5.7*   ALBUMIN 2.2* 2.1*   BILITOT 0.6 0.5   ALKPHOS 61 49   AST 39 33   ALT 7* <5*   ANIONGAP 9 13     Microbiology Results (last 7 days)       Procedure Component Value Units Date/Time    CSF culture [[id number]] Collected: 01/21/25 1546    Order Status: Completed Specimen: CSF (Spinal Fluid) from CSF Tap, Tube 2 Updated: 01/24/25 1032     CSF CULTURE No Growth to date     Gram Stain Result Cytospin indicates:      Rare WBC's      No organisms seen    Gram stain [[id number]] Collected: 01/21/25 1540    Order Status: Canceled Specimen: CSF (Spinal Fluid) from CSF Tap, Tube 2             Significant Imaging: I have reviewed all pertinent imaging results/findings within the past 24 hours.

## 2025-01-26 LAB
ALBUMIN SERPL BCP-MCNC: 2.1 G/DL (ref 3.5–5.2)
ALP SERPL-CCNC: 50 U/L (ref 40–150)
ALT SERPL W/O P-5'-P-CCNC: 5 U/L (ref 10–44)
ANION GAP SERPL CALC-SCNC: 10 MMOL/L (ref 8–16)
AST SERPL-CCNC: 39 U/L (ref 10–40)
BACTERIA CSF CULT: NO GROWTH
BASOPHILS # BLD AUTO: 0 K/UL (ref 0–0.2)
BASOPHILS NFR BLD: 0 % (ref 0–1.9)
BILIRUB SERPL-MCNC: 0.5 MG/DL (ref 0.1–1)
BUN SERPL-MCNC: 38 MG/DL (ref 8–23)
CALCIUM SERPL-MCNC: 7.5 MG/DL (ref 8.7–10.5)
CHLORIDE SERPL-SCNC: 110 MMOL/L (ref 95–110)
CO2 SERPL-SCNC: 24 MMOL/L (ref 23–29)
CREAT SERPL-MCNC: 0.8 MG/DL (ref 0.5–1.4)
DIFFERENTIAL METHOD BLD: ABNORMAL
EOSINOPHIL # BLD AUTO: 0 K/UL (ref 0–0.5)
EOSINOPHIL NFR BLD: 0 % (ref 0–8)
ERYTHROCYTE [DISTWIDTH] IN BLOOD BY AUTOMATED COUNT: 14.2 % (ref 11.5–14.5)
EST. GFR  (NO RACE VARIABLE): >60 ML/MIN/1.73 M^2
GLUCOSE SERPL-MCNC: 84 MG/DL (ref 70–110)
GRAM STN SPEC: NORMAL
HCT VFR BLD AUTO: 25.9 % (ref 37–48.5)
HGB BLD-MCNC: 8.4 G/DL (ref 12–16)
IMM GRANULOCYTES # BLD AUTO: 0.01 K/UL (ref 0–0.04)
IMM GRANULOCYTES NFR BLD AUTO: 0.4 % (ref 0–0.5)
LYMPHOCYTES # BLD AUTO: 0.5 K/UL (ref 1–4.8)
LYMPHOCYTES NFR BLD: 19.5 % (ref 18–48)
MAGNESIUM SERPL-MCNC: 1.6 MG/DL (ref 1.6–2.6)
MCH RBC QN AUTO: 29.6 PG (ref 27–31)
MCHC RBC AUTO-ENTMCNC: 32.4 G/DL (ref 32–36)
MCV RBC AUTO: 91 FL (ref 82–98)
MONOCYTES # BLD AUTO: 0.3 K/UL (ref 0.3–1)
MONOCYTES NFR BLD: 9.8 % (ref 4–15)
NEUTROPHILS # BLD AUTO: 1.8 K/UL (ref 1.8–7.7)
NEUTROPHILS NFR BLD: 70.3 % (ref 38–73)
NRBC BLD-RTO: 0 /100 WBC
PHOSPHATE SERPL-MCNC: 3.5 MG/DL (ref 2.7–4.5)
PLATELET # BLD AUTO: 89 K/UL (ref 150–450)
PMV BLD AUTO: 10.9 FL (ref 9.2–12.9)
POTASSIUM SERPL-SCNC: 3.1 MMOL/L (ref 3.5–5.1)
PROT SERPL-MCNC: 5.5 G/DL (ref 6–8.4)
RBC # BLD AUTO: 2.84 M/UL (ref 4–5.4)
SODIUM SERPL-SCNC: 144 MMOL/L (ref 136–145)
WBC # BLD AUTO: 2.56 K/UL (ref 3.9–12.7)

## 2025-01-26 PROCEDURE — 25000003 PHARM REV CODE 250: Performed by: INTERNAL MEDICINE

## 2025-01-26 PROCEDURE — 85025 COMPLETE CBC W/AUTO DIFF WBC: CPT

## 2025-01-26 PROCEDURE — 63600175 PHARM REV CODE 636 W HCPCS

## 2025-01-26 PROCEDURE — 11000001 HC ACUTE MED/SURG PRIVATE ROOM

## 2025-01-26 PROCEDURE — 83735 ASSAY OF MAGNESIUM: CPT

## 2025-01-26 PROCEDURE — 36415 COLL VENOUS BLD VENIPUNCTURE: CPT

## 2025-01-26 PROCEDURE — 84100 ASSAY OF PHOSPHORUS: CPT

## 2025-01-26 PROCEDURE — 80053 COMPREHEN METABOLIC PANEL: CPT

## 2025-01-26 PROCEDURE — 25000003 PHARM REV CODE 250

## 2025-01-26 RX ORDER — POTASSIUM CHLORIDE 750 MG/1
30 CAPSULE, EXTENDED RELEASE ORAL
Status: DISCONTINUED | OUTPATIENT
Start: 2025-01-26 | End: 2025-01-30 | Stop reason: HOSPADM

## 2025-01-26 RX ORDER — LANOLIN ALCOHOL/MO/W.PET/CERES
400 CREAM (GRAM) TOPICAL 2 TIMES DAILY
Status: DISCONTINUED | OUTPATIENT
Start: 2025-01-26 | End: 2025-01-30 | Stop reason: HOSPADM

## 2025-01-26 RX ORDER — HYDRALAZINE HYDROCHLORIDE 25 MG/1
25 TABLET, FILM COATED ORAL EVERY 4 HOURS PRN
Status: DISCONTINUED | OUTPATIENT
Start: 2025-01-26 | End: 2025-01-30 | Stop reason: HOSPADM

## 2025-01-26 RX ADMIN — BENZTROPINE MESYLATE 1 MG: 1 TABLET ORAL at 09:01

## 2025-01-26 RX ADMIN — MUPIROCIN: 20 OINTMENT TOPICAL at 09:01

## 2025-01-26 RX ADMIN — FOLIC ACID-PYRIDOXINE-CYANOCOBALAMIN TAB 2.5-25-2 MG 1 TABLET: 2.5-25-2 TAB at 09:01

## 2025-01-26 RX ADMIN — CHLORHEXIDINE GLUCONATE 0.12% ORAL RINSE 15 ML: 1.2 LIQUID ORAL at 09:01

## 2025-01-26 RX ADMIN — LEVETIRACETAM 500 MG: 500 TABLET, FILM COATED ORAL at 09:01

## 2025-01-26 RX ADMIN — HYDROCORTISONE 25 MG: 5 TABLET ORAL at 09:01

## 2025-01-26 RX ADMIN — Medication 100 MG: at 09:01

## 2025-01-26 RX ADMIN — ENOXAPARIN SODIUM 40 MG: 40 INJECTION SUBCUTANEOUS at 06:01

## 2025-01-26 RX ADMIN — Medication 400 MG: at 09:01

## 2025-01-26 RX ADMIN — OLANZAPINE 5 MG: 5 TABLET, ORALLY DISINTEGRATING ORAL at 09:01

## 2025-01-26 NOTE — PLAN OF CARE
Problem: Adult Inpatient Plan of Care  Goal: Plan of Care Review  Outcome: Progressing  Goal: Readiness for Transition of Care  Outcome: Progressing     Problem: Wound  Goal: Skin Health and Integrity  Outcome: Progressing     Problem: Skin Injury Risk Increased  Goal: Skin Health and Integrity  Outcome: Progressing     Problem: Fall Injury Risk  Goal: Absence of Fall and Fall-Related Injury  Outcome: Progressing     Problem: Infection  Goal: Absence of Infection Signs and Symptoms  Outcome: Progressing

## 2025-01-27 PROBLEM — F03.918 DEMENTIA WITH BEHAVIORAL DISTURBANCE: Status: ACTIVE | Noted: 2025-01-27

## 2025-01-27 PROBLEM — R45.1 AGITATION: Status: ACTIVE | Noted: 2025-01-27

## 2025-01-27 PROBLEM — G93.40 ACUTE ENCEPHALOPATHY: Status: ACTIVE | Noted: 2025-01-27

## 2025-01-27 PROBLEM — E87.6 HYPOKALEMIA: Status: ACTIVE | Noted: 2025-01-27

## 2025-01-27 PROBLEM — G93.41 ENCEPHALOPATHY, METABOLIC: Status: ACTIVE | Noted: 2025-01-06

## 2025-01-27 PROBLEM — R78.81 BACTEREMIA: Status: ACTIVE | Noted: 2025-01-27

## 2025-01-27 PROBLEM — F29 PSYCHOSES: Status: ACTIVE | Noted: 2025-01-27

## 2025-01-27 PROBLEM — D68.59 HYPERCOAGULABLE STATE: Status: ACTIVE | Noted: 2025-01-27

## 2025-01-27 PROBLEM — E43 SEVERE MALNUTRITION: Status: ACTIVE | Noted: 2025-01-27

## 2025-01-27 PROBLEM — D61.818 PANCYTOPENIA: Status: ACTIVE | Noted: 2025-01-27

## 2025-01-27 PROBLEM — F19.20 DRUG DEPENDENCE: Status: ACTIVE | Noted: 2025-01-05

## 2025-01-27 PROBLEM — G82.50 QUADRIPARESIS: Status: ACTIVE | Noted: 2025-01-27

## 2025-01-27 LAB
ALBUMIN SERPL BCP-MCNC: 2.5 G/DL (ref 3.5–5.2)
ALP SERPL-CCNC: 65 U/L (ref 40–150)
ALT SERPL W/O P-5'-P-CCNC: 13 U/L (ref 10–44)
ANION GAP SERPL CALC-SCNC: 13 MMOL/L (ref 8–16)
AST SERPL-CCNC: 60 U/L (ref 10–40)
BASOPHILS # BLD AUTO: 0 K/UL (ref 0–0.2)
BASOPHILS NFR BLD: 0 % (ref 0–1.9)
BILIRUB SERPL-MCNC: 0.8 MG/DL (ref 0.1–1)
BUN SERPL-MCNC: 36 MG/DL (ref 8–23)
CALCIUM SERPL-MCNC: 8 MG/DL (ref 8.7–10.5)
CHLORIDE SERPL-SCNC: 108 MMOL/L (ref 95–110)
CO2 SERPL-SCNC: 23 MMOL/L (ref 23–29)
CREAT SERPL-MCNC: 0.8 MG/DL (ref 0.5–1.4)
DIFFERENTIAL METHOD BLD: ABNORMAL
EOSINOPHIL # BLD AUTO: 0 K/UL (ref 0–0.5)
EOSINOPHIL NFR BLD: 0 % (ref 0–8)
ERYTHROCYTE [DISTWIDTH] IN BLOOD BY AUTOMATED COUNT: 14.6 % (ref 11.5–14.5)
EST. GFR  (NO RACE VARIABLE): >60 ML/MIN/1.73 M^2
GLUCOSE SERPL-MCNC: 94 MG/DL (ref 70–110)
HCT VFR BLD AUTO: 32.2 % (ref 37–48.5)
HGB BLD-MCNC: 10.7 G/DL (ref 12–16)
IMM GRANULOCYTES # BLD AUTO: 0.01 K/UL (ref 0–0.04)
IMM GRANULOCYTES NFR BLD AUTO: 0.2 % (ref 0–0.5)
LYMPHOCYTES # BLD AUTO: 0.9 K/UL (ref 1–4.8)
LYMPHOCYTES NFR BLD: 19.4 % (ref 18–48)
MAGNESIUM SERPL-MCNC: 1.7 MG/DL (ref 1.6–2.6)
MCH RBC QN AUTO: 29.4 PG (ref 27–31)
MCHC RBC AUTO-ENTMCNC: 33.2 G/DL (ref 32–36)
MCV RBC AUTO: 89 FL (ref 82–98)
MONOCYTES # BLD AUTO: 0.4 K/UL (ref 0.3–1)
MONOCYTES NFR BLD: 8.6 % (ref 4–15)
NEUTROPHILS # BLD AUTO: 3.2 K/UL (ref 1.8–7.7)
NEUTROPHILS NFR BLD: 71.8 % (ref 38–73)
NRBC BLD-RTO: 0 /100 WBC
PF4 HEPARIN CMPLX AB SER QL: 0.24 OD (ref 0–0.4)
PHOSPHATE SERPL-MCNC: 3.2 MG/DL (ref 2.7–4.5)
PLATELET # BLD AUTO: 137 K/UL (ref 150–450)
PMV BLD AUTO: 10.3 FL (ref 9.2–12.9)
POTASSIUM SERPL-SCNC: 2.8 MMOL/L (ref 3.5–5.1)
PROT SERPL-MCNC: 6.4 G/DL (ref 6–8.4)
RBC # BLD AUTO: 3.64 M/UL (ref 4–5.4)
SODIUM SERPL-SCNC: 144 MMOL/L (ref 136–145)
WBC # BLD AUTO: 4.44 K/UL (ref 3.9–12.7)

## 2025-01-27 PROCEDURE — 97112 NEUROMUSCULAR REEDUCATION: CPT

## 2025-01-27 PROCEDURE — 63600175 PHARM REV CODE 636 W HCPCS: Performed by: INTERNAL MEDICINE

## 2025-01-27 PROCEDURE — 90792 PSYCH DIAG EVAL W/MED SRVCS: CPT | Mod: AF,HB,, | Performed by: PSYCHIATRY & NEUROLOGY

## 2025-01-27 PROCEDURE — 36415 COLL VENOUS BLD VENIPUNCTURE: CPT

## 2025-01-27 PROCEDURE — 97530 THERAPEUTIC ACTIVITIES: CPT

## 2025-01-27 PROCEDURE — 25000003 PHARM REV CODE 250: Performed by: INTERNAL MEDICINE

## 2025-01-27 PROCEDURE — 84100 ASSAY OF PHOSPHORUS: CPT

## 2025-01-27 PROCEDURE — 85025 COMPLETE CBC W/AUTO DIFF WBC: CPT

## 2025-01-27 PROCEDURE — 80053 COMPREHEN METABOLIC PANEL: CPT

## 2025-01-27 PROCEDURE — 97535 SELF CARE MNGMENT TRAINING: CPT

## 2025-01-27 PROCEDURE — 92526 ORAL FUNCTION THERAPY: CPT

## 2025-01-27 PROCEDURE — 63600175 PHARM REV CODE 636 W HCPCS

## 2025-01-27 PROCEDURE — 25000003 PHARM REV CODE 250

## 2025-01-27 PROCEDURE — 83735 ASSAY OF MAGNESIUM: CPT

## 2025-01-27 PROCEDURE — 11000001 HC ACUTE MED/SURG PRIVATE ROOM

## 2025-01-27 RX ORDER — QUETIAPINE FUMARATE 25 MG/1
25 TABLET, FILM COATED ORAL EVERY 6 HOURS PRN
Status: DISCONTINUED | OUTPATIENT
Start: 2025-01-27 | End: 2025-01-30 | Stop reason: HOSPADM

## 2025-01-27 RX ORDER — DEXTROSE MONOHYDRATE, SODIUM CHLORIDE, AND POTASSIUM CHLORIDE 50; 1.49; 4.5 G/1000ML; G/1000ML; G/1000ML
INJECTION, SOLUTION INTRAVENOUS CONTINUOUS
Status: DISPENSED | OUTPATIENT
Start: 2025-01-27 | End: 2025-01-28

## 2025-01-27 RX ORDER — LEVETIRACETAM 500 MG/5ML
500 INJECTION, SOLUTION, CONCENTRATE INTRAVENOUS 2 TIMES DAILY
Status: DISCONTINUED | OUTPATIENT
Start: 2025-01-27 | End: 2025-01-30 | Stop reason: HOSPADM

## 2025-01-27 RX ORDER — QUETIAPINE FUMARATE 25 MG/1
50 TABLET, FILM COATED ORAL NIGHTLY
Status: DISCONTINUED | OUTPATIENT
Start: 2025-01-27 | End: 2025-01-29

## 2025-01-27 RX ORDER — POTASSIUM CHLORIDE 750 MG/1
30 CAPSULE, EXTENDED RELEASE ORAL
Status: DISCONTINUED | OUTPATIENT
Start: 2025-01-27 | End: 2025-01-27

## 2025-01-27 RX ORDER — ACETAMINOPHEN 325 MG/1
650 TABLET ORAL EVERY 4 HOURS PRN
Status: DISCONTINUED | OUTPATIENT
Start: 2025-01-27 | End: 2025-01-29

## 2025-01-27 RX ADMIN — MUPIROCIN: 20 OINTMENT TOPICAL at 09:01

## 2025-01-27 RX ADMIN — LEVETIRACETAM 500 MG: 100 INJECTION INTRAVENOUS at 04:01

## 2025-01-27 RX ADMIN — POTASSIUM CHLORIDE 30 MEQ: 750 CAPSULE, EXTENDED RELEASE ORAL at 06:01

## 2025-01-27 RX ADMIN — BENZTROPINE MESYLATE 1 MG: 1 TABLET ORAL at 09:01

## 2025-01-27 RX ADMIN — HYDROCORTISONE SODIUM SUCCINATE 25 MG: 250 INJECTION, POWDER, FOR SOLUTION INTRAMUSCULAR; INTRAVENOUS at 11:01

## 2025-01-27 RX ADMIN — ENOXAPARIN SODIUM 40 MG: 40 INJECTION SUBCUTANEOUS at 04:01

## 2025-01-27 RX ADMIN — QUETIAPINE FUMARATE 50 MG: 25 TABLET ORAL at 09:01

## 2025-01-27 RX ADMIN — LEVETIRACETAM 500 MG: 100 INJECTION INTRAVENOUS at 09:01

## 2025-01-27 RX ADMIN — Medication 400 MG: at 09:01

## 2025-01-27 RX ADMIN — CHLORHEXIDINE GLUCONATE 0.12% ORAL RINSE 15 ML: 1.2 LIQUID ORAL at 09:01

## 2025-01-27 NOTE — ASSESSMENT & PLAN NOTE
Patient's most recent potassium results are listed below.   Recent Labs     01/27/25  0323 01/28/25  0433 01/29/25  0848   K 2.8* 3.1* 4.8     Plan  - Replete potassium per protocol  - Monitor potassium Daily  - Patient's hypokalemia is worsening. Will adjust treatment as follows: IV replacement through fluids  IV and oral replacement

## 2025-01-27 NOTE — ASSESSMENT & PLAN NOTE
9/2023 FTA Abs weekly positive; 8/18/2024 FTA reactive and weakly reactive per office of public health - she was not treated as felt to be false positives  RPR 1:1 here  ID saw patient. Recommended treatments based on our suspicion of syphilis or neurosyphilis  Given terrible mental status and little improvement since admission will treat for neurosyphilis - continuous penicillin G 24 million units daily until enteral intake is stable and sustained (then can switch to doxycyline 100 mg  BID) for 21 days total.

## 2025-01-27 NOTE — ASSESSMENT & PLAN NOTE
Minimal intake since admission  Oral intake not improved as her alertness improved  Nursing to assist with all feedings  She may need PEG at this time - calorie count

## 2025-01-27 NOTE — PT/OT/SLP PROGRESS
Physical Therapy Co-Treatment    Patient Name:  Tresa Henry   MRN:  [id number]    Recommendations:     Discharge Recommendations: Moderate Intensity Therapy  Discharge Equipment Recommendations: hospital bed  Barriers to discharge: None    Assessment:     Tresa Henry is a 64 y.o. female admitted with a medical diagnosis of Encephalopathy, metabolic.  She presents with the following impairments/functional limitations: weakness, impaired endurance, impaired self care skills, impaired functional mobility, gait instability, impaired balance, visual deficits, impaired cognition, decreased coordination, decreased upper extremity function, decreased lower extremity function, decreased safety awareness. Pt more altered today, unable to actively participate in treatment due to cognitive state. Her eyes were only open briefly 3x during session and she followed no commands. RN called to bedside due to significant change in pt status compared to previous tx session, w/ RN reporting this is how pt has been all shift. MD alerted after session due to therapist inability to determine when the change began to ensure MD team aware.    Rehab Prognosis: Fair; patient would benefit from acute skilled PT services to address these deficits and reach maximum level of function.    Recent Surgery: * No surgery found *      Plan:     During this hospitalization, patient to be seen 4 x/week to address the identified rehab impairments via therapeutic activities, therapeutic exercises, neuromuscular re-education and progress toward the following goals:    Plan of Care Expires:  02/07/25    Subjective     Chief Complaint: NA 2/2 AMS  Patient/Family Comments/goals: pt mumbling nonsensically the entire session and only stops occasionally to count (ex: asked how many people in room and started counting up into the 30s by ones until she returned to mumbling)  Pain/Comfort:  Pain Rating 1: 0/10  Pain Rating Post-Intervention 1:  0/10      Objective:     Communicated with RN prior to session.  Patient found right sidelying with telemetry, herr catheter, bed alarm, pressure relief boots upon PT entry to room. Co-tx w/ OT 2/2 suspected pt complexity and requirement of 2 skilled therapists to assist in order to maximize pt treatment     General Precautions: Standard, aspiration, fall  Orthopedic Precautions: N/A  Braces: N/A  Respiratory Status: Room air     Functional Mobility:  Bed Mobility:     Rolling Left:  dependence  Rolling Right: dependence  Scooting: dependence  Supine to Sit: dependence and of 2 persons  Sit to Supine: dependence and of 2 persons  Balance: EOB sitting balance max-total A w/ R lean, posterior lean, and pt intermittently leaning onto R forearm to try and lay back down      AM-PAC 6 CLICK MOBILITY  Turning over in bed (including adjusting bedclothes, sheets and blankets)?: 1  Sitting down on and standing up from a chair with arms (e.g., wheelchair, bedside commode, etc.): 1  Moving from lying on back to sitting on the side of the bed?: 1  Moving to and from a bed to a chair (including a wheelchair)?: 1  Need to walk in hospital room?: 1  Climbing 3-5 steps with a railing?: 1  Basic Mobility Total Score: 6       Treatment & Education:  Pt educated on PT POC/goals, d/c recs, and continued treatment  PROM and stretching BLE, WB through B lower legs in sitting EOB, inc time and verbal/tactile cueing to attempt to get pt participation  Rolling L/R in bed to reposition pt w/ wedges and pillows to offload pressure, avoid muscle contractures, and prevent skin breakdown. Pt educated on importance of pt being rotated every 2hrs for these reasons.     Patient left left sidelying with all lines intact, call button in reach, bed alarm on, and RN and MD notified..    GOALS:   Multidisciplinary Problems       Physical Therapy Goals          Problem: Physical Therapy    Goal Priority Disciplines Outcome Interventions   Physical  Therapy Goal     PT, PT/OT Progressing    Description: Goals to be completed by: 2/7/25    Pt will perform sup<>sit transfers w/ moderate assistance  Pt will have sufficient dynamic balance to sit EOB while performing ADLs/therex w/ minimum assistance  Pt will be able to stand up from EOB w/ maximal assistance using LRAD  Pt will be independent w/ HEP therex on BLE w/ good form and ROM     DME Justifications (see above for complete DME recommendations)  Hospital Bed- Patient requires a hospital bed for positioning of the body in ways that are not feasible with an ordinary bed. The patient requires special positioning for pain relief, limited mobility, and/or being unable to independently make changes in body position without the use of a hospital bed. Pillows and wedges will not be adequate for resolving these positional issues.                         DME Justifications:  Tresa requires a hospital bed due to her requiring positioning of the body in ways not feasible with an ordinary bed due to being completely immobile and cannot independently make changes in body position without the use of the bed.The positioning of the body cannot be sufficiently resolved by the use of pillows and wedges.    Time Tracking:     PT Received On: 01/27/25  PT Start Time: 0939     PT Stop Time: 1012  PT Total Time (min): 33 min     Billable Minutes: Therapeutic Activity 15 and Neuromuscular Re-education 18    Treatment Type: Treatment  PT/PTA: PT     Number of PTA visits since last PT visit: 0     01/27/2025

## 2025-01-27 NOTE — CARE UPDATE
I have reviewed the chart of Tresa Henry who is hospitalized for the following:    Active Hospital Problems    Diagnosis    *Encephalopathy, metabolic    Hypercoagulable state     Due to colon cancer      Pancytopenia    Agitation    Dementia with behavioral disturbance    Hypokalemia     Monitor with daily labs  replace      Quadriparesis     Therapy to evaluate and treat      Adrenal insufficiency    Syphilis    Tobacco dependency    Paralysis of right common peroneal nerve    Delirium    Hypernatremia    Secondary spontaneous pneumothorax    MSSA bacteremia    Influenza A with pneumonia    Acute hypoxemic respiratory failure    Drug dependence    Sepsis with acute organ dysfunction    Thrombocytopenia     Monitor with daily cbc      Colon cancer        Venita Quinones NP  Unit Based [facility]

## 2025-01-27 NOTE — ASSESSMENT & PLAN NOTE
Strep pneumo urine antigen positive   MSSA bacteremia; follow-up Bcx cleared  Influenza A positive  UA negative for infectious etiology     Placed on Vanc and Zosyn --> Cefazolin and Zosyn --> Vanc and Rm (changed per ID due to thrombocytopenia and spiking fevers on 01/11). Completed Tamiflu. ID deferred treatment of the maame dubliniensis respiratory culture.     - TTE negative for endocarditis  - completed two week course of cefazolin

## 2025-01-27 NOTE — PROGRESS NOTES
[facility] Medicine  Progress note    Team: [facility]P MED R Krista León MD  Admit Date: 1/20/2025  Code status: Full Code    Principal Problem:  Encephalopathy, metabolic    Interval hx: Nursing and therapy reports patient is less responsive today.     PEx  Temp:  [96.9 °F (36.1 °C)-98.2 °F (36.8 °C)]   Pulse:  []   Resp:  [16-19]   BP: (150-180)/()   SpO2:  [95 %-97 %]     Intake/Output Summary (Last 24 hours) at 1/27/2025 1415  Last data filed at 1/27/2025 1100  Gross per 24 hour   Intake 800 ml   Output 1600 ml   Net -800 ml       General Appearance: no acute distress, WD, chronically ill-appeaing  Heart: regular rate and rhythm, no heave  Respiratory: Normal respiratory effort, symmetric excursion, bilateral vesicular breath sounds   Abdomen: Soft, non-tender; bowel sounds active  Skin: intact, no rash, no ulcers  Neurologic:  No focal numbness or weakness  Mental status: Alert, oriented to name only, affect appropriate    Recent Labs   Lab 01/25/25  1015 01/26/25  0434 01/27/25  0323   WBC 3.31* 2.56* 4.44   HGB 9.0* 8.4* 10.7*   HCT 27.1* 25.9* 32.2*   PLT 87* 89* 137*     Recent Labs   Lab 01/25/25 1015 01/26/25  0434 01/27/25  0323    144 144   K 2.9* 3.1* 2.8*    110 108   CO2 24 24 23   BUN 40* 38* 36*   CREATININE 0.8 0.8 0.8   * 84 94   CALCIUM 7.6* 7.5* 8.0*   MG 1.6 1.6 1.7   PHOS 3.2 3.5 3.2     Recent Labs   Lab 01/25/25  1015 01/26/25  0434 01/27/25  0323   ALKPHOS 54 50 65   ALT <5* 5* 13   AST 33 39 60*   ALBUMIN 2.2* 2.1* 2.5*   PROT 5.7* 5.5* 6.4   BILITOT 0.5 0.5 0.8        Scheduled Meds:   benztropine  1 mg Per NG tube BID    chlorhexidine  15 mL Mouth/Throat BID    enoxparin  40 mg Subcutaneous Q24H (prophylaxis, 1700)    folic acid-vit B6-vit B12 2.5-25-2 mg  1 tablet Oral Daily    hydrocortisone  25 mg Oral BID    Followed by    hydrocortisone  20 mg Oral BID    Followed by    [START ON 1/29/2025] hydrocortisone  15 mg Oral BID    Followed by    [START ON  1/31/2025] hydrocortisone  10 mg Oral BID    Followed by    [START ON 2/2/2025] hydrocortisone  5 mg Oral BID    levETIRAcetam  500 mg Oral BID    magnesium oxide  400 mg Oral BID    mupirocin   Nasal BID    OLANZapine zydis  5 mg Per NG tube Daily    potassium chloride  30 mEq Oral with breakfast    potassium chloride  30 mEq Oral Q2H    thiamine  100 mg Oral Daily     Continuous Infusions:  As Needed:    Current Facility-Administered Medications:     0.9%  NaCl infusion (for blood administration), , Intravenous, Q24H PRN    acetaminophen, 650 mg, Per NG tube, Q4H PRN    hydrALAZINE, 25 mg, Oral, Q4H PRN    naloxone, 0.02 mg, Intravenous, PRN    sodium chloride 0.9%, 10 mL, Intravenous, Q12H PRN    Assessment and Plan  / Problems managed today    * Encephalopathy, metabolic  Found by EMS with high suspicion for substance use. UDS THC presumed positive. CT head without acute intracranial pathology. HIV hegative, RPR positive. Hx of untreated Hep C. Collateral history provided by sister in the chart reports pt with history of worsening dementia however current mentation is not baseline. Prior to admission, sister reports that patient experience cough and fevers. Likely multifactorial: metabolic secondary to electrolyte disturbances, sepsis and/or substance abuse. Likely compounded by delirium given prolonged critical admission. Neurology consulted, agree with multifactorial etiology. Will f/u in clinic at [facility]. Mentation improving, though still oriented x1 only.     - neurochecks  - aspiration, delirium and fall precautions  - Continued correction of metabolic parameters, including azotemia, hyperammonia, hypocalcemia and hypernatremia (now resolved)  - ID consulted for possible syphilis tx   - Delirium precautions  - empiric high dose IV thiamine for 5 days, followed by 100mg po    1/27 Dramatic changed in MS. I have low suspicion for CVA. Will do CT head any ways  Maybe non-epileptic status? EEG  Psych medication  related or psych related ? Psychiatry consulted  Reported history of dementia - is the rapidly progressing...CJD or lewey body dementia? Neurology consulted  Will start treatment for neurosyphillis given persistently positive syphilis testing    Adrenal insufficiency  Started during sepsis three weeks ago  Wean off over a weak    Will not treat associated elevated blood pressures    Syphilis  9/2023 FTA Abs weekly positive; 8/18/2024 FTA reactive and weakly reactive per office of public health - she was not treated as felt to be false positives  RPR 1:1 here  ID saw patient. Recommended treatments based on our suspicion of syphilis or neurosyphilis  Given terrible mental status and little improvement since admission will treat for neurosyphilis - continuous penicillin G 24 million units daily until enteral intake is stable and sustained (then can switch to doxycyline 100 mg  BID) for 21 days total.    Paralysis of right common peroneal nerve  -- will f/u in neuro clinic at [facility]       Tobacco dependency  Dangers of cigarette smoking were reviewed with patient in detail. Patient was Counseled for 3-10 minutes. Nicotine replacement options were discussed. Nicotine replacement was discussed- not prescribed per patient's request    Delirium  Patient with acute delirium. There is no specific treatment. Will avoid narcotics/benzos that are known to worsen condition and add PRN antipsychotics to limit behaviors of self harm. Monitor closely.  Fall and delirium precautions.     Hypernatremia  Hypernatremia is likely due to Dehydration. The patient's most recent sodium results are listed below.  Recent Labs     01/22/25  0353 01/23/25  0325 01/24/25  0337    140 147*       Plan  - Aim to correct the sodium by 8-10mEq in 24 hours.   - Will plan to trend the patient's sodium: Daily  - The patient's hypernatremia is stable  - encourage PO now that patient is more awake. Can give additional IVF if continued hypernatremia  despite PO intake     Secondary spontaneous pneumothorax  S/p chest tube placement and adequate re-expansion on repeat CXR. Etiology of pneumothorax potentially from substance inhalation causing lung damage overtime. CT c/a/p 10/2024 with evidence of aerated lungs; however severe lung damage with evidence of ephysema and opacification noted on most recent CT.     MSSA bacteremia  Bxc 01/05 MSSA. Was on Vanc and Zosyn; however transitioned to Vanc and Meropenem due to thrombocytopenia per ID.      - [facility] ancef [facility], cont for now   - f/u echo  - ID consulted, appreciate recs     Influenza A with pneumonia  RIP 01/05 positive for influenza A. Completed Tamiflu.     Drug dependence  C/f polysubstance abuse. UDS 01/05 presumed positive for THC. Completed Klonopin taper per Psych at [facility]SH for potential benzo withdrawal. On empiric IV thiamine with planned transition to PO     Sepsis with acute organ dysfunction  Strep pneumo urine antigen positive   MSSA bacteremia; follow-up Bcx cleared  Influenza A positive  UA negative for infectious etiology     Placed on Vanc and Zosyn --> Cefazolin and Zosyn --> Vanc and Rm (changed per ID due to thrombocytopenia and spiking fevers on 01/11). Completed Tamiflu. ID deferred treatment of the maame dubliniensis respiratory culture.     - TTE negative for endocarditis  - completed two week course of cefazolin    Colon cancer  -h/o noted           Diet:  dental soft  GI PPx: not needed  DVT PPx:  enoxaparin  Airways: room air  Wounds: none    Goals of Care:  Return to prior functional status     Discharge Planning   NICK: 2/5/2025   Is the patient medically ready for discharge?:     Reason for patient still in hospital (select all that apply): Patient trending condition and Treatment  Discharge Plan A: Rehab   Discharge Delays: None known at this time    Krista León MD

## 2025-01-27 NOTE — ASSESSMENT & PLAN NOTE
Found by EMS with high suspicion for substance use. UDS THC presumed positive. CT head without acute intracranial pathology. HIV hegative, RPR positive.  Neurology deems no reversible tx'd causes at this time, suspect multifactorial etiology.   - neurochecks  - aspiration, delirium and fall precautions  Likely with delirium on dementia  Waxing/waning - could be new baseline  Cannot live alone w/ current impaired cognitive state

## 2025-01-27 NOTE — ASSESSMENT & PLAN NOTE
Hypernatremia is likely due to Dehydration. The patient's most recent sodium results are listed below.  Recent Labs     01/27/25  0323 01/28/25  0433 01/29/25  0848    144 139     Plan  - Aim to correct the sodium by 8-10mEq in 24 hours.   - Will plan to trend the patient's sodium: Daily  - The patient's hypernatremia is stable  - encourage PO now that patient is more awake. Can give additional IVF if continued hypernatremia despite PO intake   Due to poor intake. May need to consider PEG is she keeps requiring IV hydration

## 2025-01-27 NOTE — ASSESSMENT & PLAN NOTE
Bxc 01/05 MSSA. Was on Vanc and Zosyn; however transitioned to Vanc and Meropenem due to thrombocytopenia per ID.      - on ancef at [facility] hospital, cont for now   - f/u echo negative for vegetation  - ID consulted, appreciate recs . Completed treatment

## 2025-01-27 NOTE — PLAN OF CARE
[facility] Neurosurgery ([facility])  Discharge Reassessment    Primary Care Provider: Bina Holguin NP    Expected Discharge Date: 2/5/2025    Patient is not medically ready for discharge.  Patient is moderate intensity therapy level.  Referrals to be sent out for appropriate level of care once patient is closer to med ready status.  PASSR completed and faxed to [facility].   requested CHW call in [facility].    Discharge Plan A and Plan B have been determined by review of patient's clinical status, future medical and therapeutic needs, and coverage/benefits for post-acute care in coordination with multidisciplinary team members.     Reassessment (most recent)       Discharge Reassessment - 01/27/25 0958          Discharge Reassessment    Assessment Type Discharge Planning Reassessment     Did the patient's condition or plan change since previous assessment? No     Communicated NICK with patient/caregiver Date not available/Unable to determine     Discharge Plan A Rehab     Discharge Plan B Skilled Nursing Facility     DME Needed Upon Discharge  other (see comments)   tbd    Transition of Care Barriers None     Why the patient remains in the hospital Requires continued medical care        Post-Acute Status    Discharge Delays None known at this time

## 2025-01-27 NOTE — CONSULTS
"CONSULTATION LIAISON PSYCHIATRY INITIAL EVALUATION    Patient Name: Tresa Henry  MRN: [id number]  Patient Class: IP- Inpatient  Admission Date: 1/20/2025  Attending Physician: Krista León MD      HPI:   Tresa Henry is a 64 y.o. female with past psychiatric history of schizophrenia spectrum vs bipolar disorder w psychotic features, methamphetamine use and cannabis use & past pertinent medical history of HLD, hep C, colon cancer s/p resection presents to the ED/admitted to the hospital for AMS.    Patient presented to OSH on 1/5 after EMS was contacted by unknown caller expressing concern for patient. On arrival, EMS found several people at patient's house intoxication. Patient noted to be confused on arrival and was taken to ED for evaluation. UDS + THC. She was found to be bacteremic and influenza positive with an HUSEYIN and electrolyte derangements. Patient's mentation improved but she subsequently became more agitated and was found to have pneumothorax. She was intubated for treatment and extubated on 1/14. She was transferred to [facility] for further management of acute encephalopathy.     Psychiatry consulted for "history of schizophrenia, but with dramatic waxing and waning mental status. Can we hold her psych meds to rule them out as cause? She is much less responsive and she has catatonia?"     On psych exam, patient is slumped over in bed talking to herself. She states that she came to the hospital because she was not doing what she was supposed to be doing. She was unable to elaborate further on this. She is oriented to self but not place (states that she is in a school) or time. She reports that she is having back pain. Asked whether she is having AVH she states "all the time" but is unable to elaborate further. She stated that she was feeling very sad and worried.     On subsequent evaluation on rounds, patient is again slumped over in bed and partially disrobed. She remained disorganized with " "poor attention. She states that she has seen psychiatrist before for schizophrenia. When discussing medications she states that she cannot take Risperdal because it did something to her heart, cannot take Haldol, and cannot take Depakote. She ruminated throughout interview on being cold. Asked whether she was experiencing any concern that others were attempting to harm her she stated "all the time". She replied similarly when asked about AVH. When asked what she sees she started talking about baby dolls and wigs.     Medical Review of Systems:  Musculoskeletal:positive for back pain    Psychiatric Review of Systems (is patient experiencing or having changes in):  sleep: yes, states that she doesn't sleep well   appetite: ALONDRA  weight: ALONDRA  energy/anergy: ALONDRA  interest/pleasure/anhedonia: ALONDRA  somatic symptoms: ALONDRA  libido: ALONDRA  anxiety/panic: ALONDRA  guilty/hopelessness: ALONDRA  concentration: ALONDRA  Marizol:ALONDRA  Psychosis: yes  Trauma: ALONDRA  S.I.B.s/risky behavior: ALONDRA    Past Psychiatric History:  Previous Medication Trials: yes  Previous Psychiatric Hospitalizations: yes   Previous Suicide Attempts: yes  History of Violence: not asked  Outpatient Psychiatrist: yes, Lazara Suresh NP  Family Psychiatric History: yes, mother w schizophrenia    Substance Abuse History (with emphasis over the last 12 months):  Recreational Drugs:  UDS + THC, history of methamphetamine use  Use of Alcohol: denied  History of complicated alcohol withdrawal (delirium tremens, seizures): denies  Tobacco Use: denies, yes per chart review  Rehab History: not asked    Social History:  Marital Status:   Children: 5  Employment Status/Info:  not asked  : no  Education: high school diploma/GED  Special Ed: not asked  Housing Status: Not asked  Access to gun: not asked  Psychosocial Stressors: ALONDRA  Functioning Relationships: ALONDRA    Legal History:  Past Charges/Incarcerations: not asked  Pending charges: not asked    Mental Status " "Exam:  General Appearance: thin and gaunt, poorly groomed, disheveled, partially or completely disrobed  Behavior: Calm, participatory, fidgety   Involuntary Movements and Motor Activity: no abnormal involuntary movements noted; no tics, no tremors, no akathisia, no dystonia, no evidence of tardive dyskinesia; no psychomotor agitation or retardation  Gait and Station: unable to assess - patient lying down or seated  Speech and Language: normal rate, soft, mumbling  Mood: "Not good"  Affect: constricted  Thought Process and Associations: disorganized, +loosening of associations  Thought Content: no suicidal ideation, no homicidal ideation, + auditory hallucinations, + visual hallucinations, + paranoid ideation  Perceptual Disturbances: hallucinations:  auditory and visual  Sensorium and Orientation: oriented to person only  Recent and Remote Memory: impaired  Attention and Concentration: impaired  Fund of Knowledge: impaired  Insight: impaired  Judgment: impaired    CAM ICU positive? yes      ASSESSMENT & RECOMMENDATIONS   Unspecified schizophrenia spectrum and other psychotic disorders  Unspecified delirium   H/O methamphetamine use disorder    Scheduled Medication(s):  Discontinue Zyprexa   Discontinue Cogentin as anticholinergic medications can worsen delirium   Start Seroquel 50 mg nightly for treatment of delirium and h/o psychotic disorder    PRN Medication(s):  Seroquel 25 mg q6hr PRN for non-redirectable agitation.     Other Recommendations (labs, imaging, further consults, etc.):  Please provide patient assistance with meals       Delirium Behavior Management  PLEASE utilize PRN meds first for agitation. Minimize use of PHYSICAL restraints OR have periods of being out of physical restraints if possible.  Keep window shades open and room lit during day and room dim at night in order to promote normal sleep-wake cycles  Encourage family at bedside. [place] patient often to situation, location, date.  Continue " to Limit or Discontinue use of Narcotics, Benzos and Anti-cholinergic medications as they may worsen delirium.  Continue medical workup for causative etiology of Delirium.     Medical dx complicating psych dx  Syphilis  - RPR 1:1   - Started on empiric neurosyphilis treatment with penicillin    Sepsis, resolved   - MSSA bacteremia s/p Abx, subsequent Bcx clear  - UA without LE, nitrates, pyuria     Adrenal insufficiency   - 2/2 sepsis   - on steroid taper          Risk Assessment / Legal Status  Patient does not meet criteria for PEC or involuntary inpatient psychiatric admission at this time. Recommend to rescind PEC if one was placed. Patient is not currently an imminent danger to self or others and is not gravely disabled due to a psychiatric illness.    Follow-up:  Will follow-up while in house.    Disposition:   Defer to primary team.    Please contact ON CALL psychiatry service (24/7) for any acute issues that may arise.    Jer Teresa MD, PhD  [facility], PGY-2   [facility]  1/27/2025 4:56 PM        --------------------------------------------------------------------------------------------------------------------------------------------------------------------------------------------------------------------------------------    CONTINUED HISTORY & OBJECTIVE clinical data & findings reviewed and noted for above decision making    Past Medical/Surgical History:   Past Medical History:   Diagnosis Date    Addiction to drug 1984    [facility]     Bipolar 1 disorder     Dementia with behavioral disturbance 1/27/2025    Depression 1984    [facility] in [place].     Encounter for blood transfusion     Hallucination 2018    Tactile Hallucinations.     History of psychiatric hospitalization 1984    Patient stated she has been hosptialiized 20 times.     History of psychiatric hospitalization  2018    3/7/18 [facility]    History of psychiatric hospitalization 2019 2/20/19; 4/9/19; 7/31/19 9/23/19 [facility]    Hx of psychiatric care 2015    Tegretal, Topamax, Thorazin, cogentin, Lithium     Liver disease     Psychiatric problem 1990    Anxiety (generalized anxiety disorder.)    Schizophrenia 1984    [facility] in [place]    Seizures     Substance abuse     Suicide attempt 11/01/1984    Therapy 2000    [facility]    Weakness 03/09/2021    Withdrawal symptoms, drug or narcotic      Past Surgical History:   Procedure Laterality Date    COLON RESECTION      COLONOSCOPY N/A 11/14/2022    Procedure: COLONOSCOPY;  Surgeon: Maggie Arriaza MD;  Location: [facility] ENDO;  Service: Endoscopy;  Laterality: N/A;    COLONOSCOPY N/A 03/05/2024    Procedure: COLONOSCOPY;  Surgeon: Amadeo Crawford MD;  Location: [facility];  Service: General;  Laterality: N/A;    ESOPHAGOGASTRODUODENOSCOPY N/A 11/14/2022    Procedure: EGD (ESOPHAGOGASTRODUODENOSCOPY);  Surgeon: Maggie Arriaza MD;  Location: [facility];  Service: Endoscopy;  Laterality: N/A;    HYSTERECTOMY         Current Medications:   Scheduled Meds:    benztropine  1 mg Per NG tube BID    chlorhexidine  15 mL Mouth/Throat BID    enoxparin  40 mg Subcutaneous Q24H (prophylaxis, 1700)    folic acid-vit B6-vit B12 2.5-25-2 mg  1 tablet Oral Daily    hydrocortisone sod succ (PF)  25 mg Intravenous Once    Followed by    hydrocortisone sod succ (PF)  20 mg Intravenous BID    Followed by    [START ON 1/29/2025] hydrocortisone sod succ (PF)  20 mg Intravenous BID    Followed by    [START ON 1/31/2025] hydrocortisone sod succ (PF)  15 mg Intravenous BID    Followed by    [START ON 2/2/2025] hydrocortisone sod succ (PF)  10 mg Intravenous BID    Followed by    [START ON 2/4/2025] hydrocortisone sod succ (PF)  5 mg Intravenous BID    levETIRAcetam (Keppra) IV (PEDS and ADULTS)  500 mg Intravenous BID    magnesium oxide  400 mg Oral BID    mupirocin    Nasal BID    OLANZapine zydis  5 mg Per NG tube Daily    penicillin G potassium 24 Million Units in D5W 500 mL CONTINUOUS INFUSION  24 Million Units Intravenous Q24H    potassium chloride  30 mEq Oral with breakfast    thiamine  100 mg Oral Daily     PRN Meds:   Current Facility-Administered Medications:     0.9%  NaCl infusion (for blood administration), , Intravenous, Q24H PRN    acetaminophen, 650 mg, Per NG tube, Q4H PRN    hydrALAZINE, 25 mg, Oral, Q4H PRN    naloxone, 0.02 mg, Intravenous, PRN    sodium chloride 0.9%, 10 mL, Intravenous, Q12H PRN    Allergies:   Review of patient's allergies indicates:   Allergen Reactions    Haldol [haloperidol lactate] Hallucinations    Divalproex Rash    Risperdal [risperidone] Palpitations       Vitals  Vitals:    01/27/25 1600   BP: (!) 178/91   Pulse: 82   Resp: 18   Temp: 97.4 °F (36.3 °C)       Labs/Imaging/Studies:  Recent Results (from the past 24 hours)   Phosphorus    Collection Time: 01/27/25  3:23 AM   Result Value Ref Range    Phosphorus 3.2 2.7 - 4.5 mg/dL   Magnesium    Collection Time: 01/27/25  3:23 AM   Result Value Ref Range    Magnesium 1.7 1.6 - 2.6 mg/dL   Comprehensive Metabolic Panel    Collection Time: 01/27/25  3:23 AM   Result Value Ref Range    Sodium 144 136 - 145 mmol/L    Potassium 2.8 (L) 3.5 - 5.1 mmol/L    Chloride 108 95 - 110 mmol/L    CO2 23 23 - 29 mmol/L    Glucose 94 70 - 110 mg/dL    BUN 36 (H) 8 - 23 mg/dL    Creatinine 0.8 0.5 - 1.4 mg/dL    Calcium 8.0 (L) 8.7 - 10.5 mg/dL    Total Protein 6.4 6.0 - 8.4 g/dL    Albumin 2.5 (L) 3.5 - 5.2 g/dL    Total Bilirubin 0.8 0.1 - 1.0 mg/dL    Alkaline Phosphatase 65 40 - 150 U/L    AST 60 (H) 10 - 40 U/L    ALT 13 10 - 44 U/L    eGFR >60.0 >60 mL/min/1.73 m^2    Anion Gap 13 8 - 16 mmol/L   CBC Auto Differential    Collection Time: 01/27/25  3:23 AM   Result Value Ref Range    WBC 4.44 3.90 - 12.70 K/uL    RBC 3.64 (L) 4.00 - 5.40 M/uL    Hemoglobin 10.7 (L) 12.0 - 16.0 g/dL    Hematocrit  32.2 (L) 37.0 - 48.5 %    MCV 89 82 - 98 fL    MCH 29.4 27.0 - 31.0 pg    MCHC 33.2 32.0 - 36.0 g/dL    RDW 14.6 (H) 11.5 - 14.5 %    Platelets 137 (L) 150 - 450 K/uL    MPV 10.3 9.2 - 12.9 fL    Immature Granulocytes 0.2 0.0 - 0.5 %    Gran # (ANC) 3.2 1.8 - 7.7 K/uL    Immature Grans (Abs) 0.01 0.00 - 0.04 K/uL    Lymph # 0.9 (L) 1.0 - 4.8 K/uL    Mono # 0.4 0.3 - 1.0 K/uL    Eos # 0.0 0.0 - 0.5 K/uL    Baso # 0.00 0.00 - 0.20 K/uL    nRBC 0 0 /100 WBC    Gran % 71.8 38.0 - 73.0 %    Lymph % 19.4 18.0 - 48.0 %    Mono % 8.6 4.0 - 15.0 %    Eosinophil % 0.0 0.0 - 8.0 %    Basophil % 0.0 0.0 - 1.9 %    Differential Method Automated

## 2025-01-27 NOTE — PT/OT/SLP PROGRESS
"Speech Language Pathology Treatment    Patient Name:  Tresa Henry   MRN:  [id number]  Admitting Diagnosis: Encephalopathy, metabolic    Recommendations:                 General Recommendations:  Dysphagia therapy  Diet recommendations:  Minced & Moist Diet - IDDSI Level 5, Liquid Diet Level: Thin liquids - IDDSI Level 0   Aspiration Precautions:   1 bite/sip at a time  Assistance with meals  Eliminate distractions  Feed only when awake/alert  HOB to 90 degrees  Meds whole buried in puree  Strict aspiration precautions   General Precautions: Standard, aspiration, droplet, fall  Communication strategies:  none    Assessment:     Tresa Henry is a 64 y.o. female with an SLP diagnosis of Dysphagia. Further complicated by encephalopathy and waxing/waning mentation. SLP to continue to follow.      Subjective     Spoke with nursing prior to session. Pt found resting in bed upon SLP entry into room. Pt agreeable to participate in all aspects of session.      Patient goals: "Can I get a ride with you and what's with all that purple?"      Pain/Comfort:    No pain reported     Respiratory Status: Room air    Objective:     Has the patient been evaluated by SLP for swallowing?   Yes      Pt seen for ongoing dysphagia therapy. Pt drowsy but awake. Pt seen during lunch meal. AM meal at bedside and remained essentially untouched. PCT at bedside reporting pt with limited PO intake. Pt agreeable to participating in PO intake at this time. She consumed cyclic sips from a straw of thin liquids without concern for aspiration or swallow difficulty. Pt also managed teaspoons of minced moist diet x4 without difficulty. Following these trials pt would not participate in any additional intake. Pt reporting she would eat once at home and SLP attempting to orient pt and provide rationale rE: hospitalization and importance of participating in meals at this time. Pt continued to refuse PO intake and turn head away from SLP and all PO " trials. SLP provided education regarding overall impressions,  minced moist  diet with thin liquids, safe swallow strategies, and ongoing SLP POC. Pt nodding in agreement however would continue to benefit from ongoing education. Spoke with RN regarding pt performance  and ongoing recommendations Whiteboard current speech to monitor.     Goals:   Multidisciplinary Problems       SLP Goals          Problem: SLP    Goal Priority Disciplines Outcome   SLP Goal     SLP Progressing   Description: Speech Pathology Goals  To be met by 2/4/25    1. Pt will participate in ongoing diagnostic dysphagia therapy                              Plan:     Patient to be seen:  3 x/week   Plan of Care expires:  01/30/25  Plan of Care reviewed with:  patient   SLP Follow-Up:  Yes       Discharge recommendations:  High Intensity Therapy   Barriers to Discharge:  None    Time Tracking:     SLP Treatment Date:   01/27/25  Speech Start Time:  1150  Speech Stop Time:  1204     Speech Total Time (min):  14 min    Billable Minutes: Treatment Swallowing Dysfunction 14      01/27/2025

## 2025-01-27 NOTE — PT/OT/SLP PROGRESS
Occupational Therapy   Co Treatment    Name: Tresa Henry  MRN: [id number]  Admitting Diagnosis:  Encephalopathy, metabolic       Recommendations:     Discharge Recommendations: Moderate Intensity Therapy  Discharge Equipment Recommendations:  wheelchair, hospital bed, lift device  Barriers to discharge:  Other (Comment) (increased skilled A needed)    Assessment:     Tresa Henry is a 64 y.o. female with a medical diagnosis of Encephalopathy, metabolic.  She presents with decreased self-care and functional mobility 2/2 AMS. Performance deficits affecting function are gait instability, weakness, decreased upper extremity function, decreased ROM, impaired endurance, impaired balance, decreased lower extremity function, impaired coordination, visual deficits, decreased safety awareness, impaired fine motor, impaired self care skills, impaired cognition, impaired functional mobility, decreased coordination. This date pt with decreased functional status with eyes open for 3 brief instances with maximal encouragement and pt with incomprehensible mumbling t/o session. Nurse notified though reporting no difference in presentation t/o morning with PT notifying MD as therapists unsure of timing of change in presentation. Pt requiring increased assistance for all mobility and self-care this date with no commands followed. Pt taking sips of drinks at bedside with no coughing noted, though with pt displaying no attempts at self-feeding. Due to increased assistance needed, pt would continue to benefit from skilled OT services. Patient continues to demonstrate the need for moderate intensity therapy on a daily basis post acute exhibited by decreased independence with self-care and functional mobility    Co-Treatment conducted due to patient medical instability and to promote patient safety.       Rehab Prognosis:  Fair; patient would benefit from acute skilled OT services to address these deficits and reach maximum level  of function.       Plan:     Patient to be seen 4 x/week to address the above listed problems via self-care/home management, therapeutic activities, therapeutic exercises, neuromuscular re-education  Plan of Care Expires: 02/24/25  Plan of Care Reviewed with: patient    Subjective     Chief Complaint: Pt counting from 1-30 to therapist question of how many people were in the room  Patient/Family Comments/goals: n/a  Pain/Comfort:  Pain Rating 1: 0/10    Objective:     Communicated with: nursing prior to session.  Patient found HOB elevated with telemetry, bed alarm, herr catheter, pressure relief boots upon OT entry to room.    General Precautions: Standard, aspiration, fall    Orthopedic Precautions:N/A  Braces: N/A  Respiratory Status: Room air     Occupational Performance:     Bed Mobility:    Patient completed Scooting/Bridging with total assistance and 2 persons  Patient completed Supine to Sit with dependent  Patient completed Sit to Supine with dependent   Pt seated EOB with maximal to total assistance 2/2 multidirectional lean and decreased postural control despite maximal verbal and tactile cues    Functional Mobility/Transfers:  Deferred 2/2 increased lethargy and decreased arousal    Activities of Daily Living:  Feeding:  maximal assistance for small sips of water/coke at bedside - pt with eyes closed for duration of activity; pt encouraged to complete activity though with decreased participation and self-initiation  Pt indicating no further ADL needs    [facility] 6 Click ADL: 7    Treatment & Education:  Session this date targeted self-care and therapeutic activities to increase pt's independence  Pt educated on OT roles, POC, call button for assistance, and importance of participation in OT sessions    Patient left HOB elevated with all lines intact, call button in reach, bed alarm on, and nursing and medical team notified    GOALS:   Multidisciplinary Problems       Occupational Therapy Goals           Problem: Occupational Therapy    Goal Priority Disciplines Outcome Interventions   Occupational Therapy Goal     OT, PT/OT Progressing    Description: Goals to be met by: 2/24/25     Patient will increase functional independence with ADLs by performing:    UE Dressing with Moderate Assistance.  LE Dressing with Moderate Assistance.  Grooming while EOB with Moderate Assistance.  Toileting from bedside commode with Moderate Assistance for hygiene and clothing management.   Sitting at edge of bed x15 minutes with Moderate Assistance.  Supine to sit with Moderate Assistance.  Stand pivot transfers with Maximum Assistance.  Step transfer with Total Assistance  Toilet transfer to bedside commode with Maximum Assistance.                         DME Justifications:  Tresa requires a hospital bed due to her requiring positioning of the body in ways not feasible with an ordinary bed due to limited ability and cannot independently make changes in body position without the use of the bed.The positioning of the body cannot be sufficiently resolved by the use of pillows and wedges.  Tresa Henry has a mobility limitation that significantly impairs her ability to participate in one or more mobility related activities of daily living (MRADLs) such as toileting, feeding, dressing, grooming, and bathing in customary locations in the home.  The mobility limitation cannot be sufficiently resolved by the use of a cane or walker.   The use of a manual wheelchair will significantly improve the patients ability to participate in MRADLS and the patient will use it on regular basis in the home.  Tresa Henry has expressed her willingness to use a manual wheelchair in the home. Patients upper body strength is sufficient for propulsion.  He/She also has a caregiver who is available, willing, and able to provide assistance with the wheelchair when needed.      Time Tracking:     OT Date of Treatment: 01/27/25  OT Start Time: 0939  OT Stop Time:  1010  OT Total Time (min): 31 min    Billable Minutes:Self Care/Home Management 16  Therapeutic Activity 15               1/27/2025

## 2025-01-27 NOTE — PROGRESS NOTES
[facility] Medicine  Progress note    Team: [facility] HOSP MED R Krista León MD  Admit Date: 1/20/2025  Code status: Full Code    Principal Problem:  Encephalopathy acute    Interval hx:  have some pain with elevated blood pressures    PEx  Temp:  [97.5 °F (36.4 °C)-99 °F (37.2 °C)]   Pulse:  []   Resp:  [18]   BP: (131-180)/()   SpO2:  [95 %-98 %]     Intake/Output Summary (Last 24 hours) at 1/26/2025 1815  Last data filed at 1/26/2025 1129  Gross per 24 hour   Intake --   Output 1850 ml   Net -1850 ml       General Appearance: no acute distress, WD, chronically ill-appeaing  Heart: regular rate and rhythm, no heave  Respiratory: Normal respiratory effort, symmetric excursion, bilateral vesicular breath sounds   Abdomen: Soft, non-tender; bowel sounds active  Skin: intact, no rash, no ulcers  Neurologic:  No focal numbness or weakness  Mental status: Alert, oriented to name only, affect appropriate    Recent Labs   Lab 01/24/25 0337 01/25/25 1015 01/26/25  0434   WBC 3.89* 3.31* 2.56*   HGB 9.1* 9.0* 8.4*   HCT 28.8* 27.1* 25.9*   PLT 83* 87* 89*     Recent Labs   Lab 01/24/25 0337 01/25/25  1015 01/26/25  0434   * 140 144   K 3.7 2.9* 3.1*   * 108 110   CO2 23 24 24   BUN 41* 40* 38*   CREATININE 1.0 0.8 0.8   * 117* 84   CALCIUM 7.5* 7.6* 7.5*   MG 1.7 1.6 1.6   PHOS 2.8 3.2 3.5     Recent Labs   Lab 01/24/25 0337 01/25/25  1015 01/26/25  0434   ALKPHOS 49 54 50   ALT <5* <5* 5*   AST 33 33 39   ALBUMIN 2.1* 2.2* 2.1*   PROT 5.7* 5.7* 5.5*   BILITOT 0.5 0.5 0.5        Scheduled Meds:   benztropine  1 mg Per NG tube BID    chlorhexidine  15 mL Mouth/Throat BID    enoxparin  40 mg Subcutaneous Q24H (prophylaxis, 1700)    folic acid-vit B6-vit B12 2.5-25-2 mg  1 tablet Oral Daily    hydrocortisone  25 mg Oral BID    Followed by    [START ON 1/27/2025] hydrocortisone  20 mg Oral BID    Followed by    [START ON 1/29/2025] hydrocortisone  15 mg Oral BID    Followed by    [START ON  1/31/2025] hydrocortisone  10 mg Oral BID    Followed by    [START ON 2/2/2025] hydrocortisone  5 mg Oral BID    levETIRAcetam  500 mg Oral BID    mupirocin   Nasal BID    OLANZapine zydis  5 mg Per NG tube Daily    thiamine  100 mg Oral Daily     Continuous Infusions:  As Needed:    Current Facility-Administered Medications:     0.9%  NaCl infusion (for blood administration), , Intravenous, Q24H PRN    acetaminophen, 650 mg, Per NG tube, Q8H PRN    dextrose 50%, 12.5 g, Intravenous, PRN    dextrose 50%, 25 g, Intravenous, PRN    glucagon (human recombinant), 1 mg, Intramuscular, PRN    glucose, 16 g, Per NG tube, PRN    glucose, 24 g, Per NG tube, PRN    HYDROcodone-acetaminophen, 1 tablet, Oral, Q6H PRN    naloxone, 0.02 mg, Intravenous, PRN    sodium chloride 0.9%, 10 mL, Intravenous, Q12H PRN    Assessment and Plan  / Problems managed today    * Encephalopathy acute  Found by EMS with high suspicion for substance use. UDS THC presumed positive. CT head without acute intracranial pathology. HIV hegative, RPR positive. Hx of untreated Hep C. Collateral history provided by sister in the chart reports pt with history of worsening dementia however current mentation is not baseline. Prior to admission, sister reports that patient experience cough and fevers. Likely multifactorial: metabolic secondary to electrolyte disturbances, sepsis and/or substance abuse. Likely compounded by delirium given prolonged critical admission. Neurology consulted, agree with multifactorial etiology. Will f/u in clinic at [place]. Mentation improving, though still oriented x1 only.     - neurochecks  - aspiration, delirium and fall precautions  - Continued correction of metabolic parameters, including azotemia, hyperammonia, hypocalcemia and hypernatremia (now resolved)  - ID consulted for possible syphilis tx   - Delirium precautions  - empiric high dose IV thiamine for 5 days, followed by 100mg po    Adrenal insufficiency  Started during  sepsis three weeks ago  Wean off over a weak    Will not treat associated elevated blood pressures    Paralysis of right common peroneal nerve  -- will f/u in neuro clinic at [place]       Tobacco dependency  Dangers of cigarette smoking were reviewed with patient in detail. Patient was Counseled for 3-10 minutes. Nicotine replacement options were discussed. Nicotine replacement was discussed- not prescribed per patient's request    Delirium  Patient with acute delirium. There is no specific treatment. Will avoid narcotics/benzos that are known to worsen condition and add PRN antipsychotics to limit behaviors of self harm. Monitor closely.  Fall and delirium precautions.     Hypernatremia  Hypernatremia is likely due to Dehydration. The patient's most recent sodium results are listed below.  Recent Labs     01/22/25  0353 01/23/25  0325 01/24/25  0337    140 147*       Plan  - Aim to correct the sodium by 8-10mEq in 24 hours.   - Will plan to trend the patient's sodium: Daily  - The patient's hypernatremia is stable  - encourage PO now that patient is more awake. Can give additional IVF if continued hypernatremia despite PO intake     Secondary spontaneous pneumothorax  S/p chest tube placement and adequate re-expansion on repeat CXR. Etiology of pneumothorax potentially from substance inhalation causing lung damage overtime. CT c/a/p 10/2024 with evidence of aerated lungs; however severe lung damage with evidence of ephysema and opacification noted on most recent CT.     MSSA bacteremia  Bxc 01/05 MSSA. Was on Vanc and Zosyn; however transitioned to Vanc and Meropenem due to thrombocytopenia per ID.      - on ancef at last hospital, cont for now   - f/u echo  - ID consulted, appreciate recs     Influenza A with pneumonia  RIP 01/05 positive for influenza A. Completed Tamiflu.     Substance abuse  C/f polysubstance abuse. UDS 01/05 presumed positive for THC. Completed Klonopin taper per Psych at OSH for potential  benzo withdrawal. On empiric IV thiamine with planned transition to PO     Sepsis with acute organ dysfunction  Strep pneumo urine antigen positive   MSSA bacteremia; follow-up Bcx cleared  Influenza A positive  UA negative for infectious etiology     Placed on Vanc and Zosyn --> Cefazolin and Zosyn --> Vanc and Rm (changed per ID due to thrombocytopenia and spiking fevers on 01/11). Completed Tamiflu. ID deferred treatment of the maame dubliniensis respiratory culture.     - f/u TTE   Antibiotics (72h ago, onward)      Start     Stop Route Frequency Ordered    01/23/25 2100  mupirocin 2 % ointment         01/28/25 2059 Nasl 2 times daily 01/23/25 1532    01/20/25 2000  ceFAZolin 2 g         -- IV Every 8 hours (non-standard times) 01/20/25 1452          -- ID consulted, appreciate recs     Colon cancer  -h/o noted           Diet:  dental soft  GI PPx: not needed  DVT PPx:  enoxaparin  Airways: room air  Wounds: none    Goals of Care:  Return to prior functional status     Discharge Planning   NICK: 2/5/2025   Is the patient medically ready for discharge?:     Reason for patient still in hospital (select all that apply): Patient trending condition and Treatment  Discharge Plan A: Rehab        Krista León MD

## 2025-01-28 ENCOUNTER — DOCUMENTATION ONLY (OUTPATIENT)
Dept: NEUROLOGY | Facility: CLINIC | Age: 65
End: 2025-01-28
Payer: MEDICAID

## 2025-01-28 PROBLEM — F20.9 SCHIZOPHRENIA: Status: ACTIVE | Noted: 2025-01-27

## 2025-01-28 PROBLEM — F03.92 DEMENTIA WITH PSYCHOTIC DISTURBANCE: Status: ACTIVE | Noted: 2025-01-27

## 2025-01-28 PROBLEM — R03.0 ELEVATED BLOOD PRESSURE READING: Status: ACTIVE | Noted: 2025-01-28

## 2025-01-28 PROBLEM — Z59.00 HOMELESSNESS: Status: ACTIVE | Noted: 2025-01-28

## 2025-01-28 LAB
ALBUMIN SERPL BCP-MCNC: 2.2 G/DL (ref 3.5–5.2)
ALP SERPL-CCNC: 54 U/L (ref 40–150)
ALT SERPL W/O P-5'-P-CCNC: 12 U/L (ref 10–44)
ANION GAP SERPL CALC-SCNC: 8 MMOL/L (ref 8–16)
ANISOCYTOSIS BLD QL SMEAR: SLIGHT
AST SERPL-CCNC: 46 U/L (ref 10–40)
BASOPHILS NFR BLD: 0 % (ref 0–1.9)
BILIRUB SERPL-MCNC: 0.6 MG/DL (ref 0.1–1)
BUN SERPL-MCNC: 29 MG/DL (ref 8–23)
CALCIUM SERPL-MCNC: 7.5 MG/DL (ref 8.7–10.5)
CHLORIDE SERPL-SCNC: 112 MMOL/L (ref 95–110)
CO2 SERPL-SCNC: 24 MMOL/L (ref 23–29)
CREAT SERPL-MCNC: 0.8 MG/DL (ref 0.5–1.4)
DIFFERENTIAL METHOD BLD: ABNORMAL
EOSINOPHIL NFR BLD: 0 % (ref 0–8)
ERYTHROCYTE [DISTWIDTH] IN BLOOD BY AUTOMATED COUNT: 14.9 % (ref 11.5–14.5)
EST. GFR  (NO RACE VARIABLE): >60 ML/MIN/1.73 M^2
GLUCOSE SERPL-MCNC: 94 MG/DL (ref 70–110)
HCT VFR BLD AUTO: 27.3 % (ref 37–48.5)
HGB BLD-MCNC: 8.9 G/DL (ref 12–16)
HYPOCHROMIA BLD QL SMEAR: ABNORMAL
IMM GRANULOCYTES # BLD AUTO: ABNORMAL K/UL (ref 0–0.04)
IMM GRANULOCYTES NFR BLD AUTO: ABNORMAL % (ref 0–0.5)
LYMPHOCYTES NFR BLD: 12 % (ref 18–48)
MAGNESIUM SERPL-MCNC: 1.7 MG/DL (ref 1.6–2.6)
MCH RBC QN AUTO: 30.1 PG (ref 27–31)
MCHC RBC AUTO-ENTMCNC: 32.6 G/DL (ref 32–36)
MCV RBC AUTO: 92 FL (ref 82–98)
MONOCYTES NFR BLD: 8 % (ref 4–15)
NEUTROPHILS NFR BLD: 80 % (ref 38–73)
NRBC BLD-RTO: 0 /100 WBC
PHOSPHATE SERPL-MCNC: 3.5 MG/DL (ref 2.7–4.5)
PLATELET # BLD AUTO: 102 K/UL (ref 150–450)
PLATELET BLD QL SMEAR: ABNORMAL
PMV BLD AUTO: 10.1 FL (ref 9.2–12.9)
POIKILOCYTOSIS BLD QL SMEAR: SLIGHT
POTASSIUM SERPL-SCNC: 3.1 MMOL/L (ref 3.5–5.1)
PROT SERPL-MCNC: 5.5 G/DL (ref 6–8.4)
RBC # BLD AUTO: 2.96 M/UL (ref 4–5.4)
SODIUM SERPL-SCNC: 144 MMOL/L (ref 136–145)
WBC # BLD AUTO: 1.85 K/UL (ref 3.9–12.7)

## 2025-01-28 PROCEDURE — 83735 ASSAY OF MAGNESIUM: CPT

## 2025-01-28 PROCEDURE — 25000003 PHARM REV CODE 250

## 2025-01-28 PROCEDURE — 36415 COLL VENOUS BLD VENIPUNCTURE: CPT

## 2025-01-28 PROCEDURE — 85027 COMPLETE CBC AUTOMATED: CPT

## 2025-01-28 PROCEDURE — 25000003 PHARM REV CODE 250: Performed by: INTERNAL MEDICINE

## 2025-01-28 PROCEDURE — 99222 1ST HOSP IP/OBS MODERATE 55: CPT | Mod: ,,, | Performed by: NURSE PRACTITIONER

## 2025-01-28 PROCEDURE — 95816 EEG AWAKE AND DROWSY: CPT

## 2025-01-28 PROCEDURE — 85007 BL SMEAR W/DIFF WBC COUNT: CPT

## 2025-01-28 PROCEDURE — 97530 THERAPEUTIC ACTIVITIES: CPT

## 2025-01-28 PROCEDURE — 63600175 PHARM REV CODE 636 W HCPCS

## 2025-01-28 PROCEDURE — 63600175 PHARM REV CODE 636 W HCPCS: Performed by: INTERNAL MEDICINE

## 2025-01-28 PROCEDURE — 80053 COMPREHEN METABOLIC PANEL: CPT

## 2025-01-28 PROCEDURE — 84100 ASSAY OF PHOSPHORUS: CPT

## 2025-01-28 PROCEDURE — 95816 EEG AWAKE AND DROWSY: CPT | Mod: 26,,, | Performed by: PSYCHIATRY & NEUROLOGY

## 2025-01-28 PROCEDURE — 11000001 HC ACUTE MED/SURG PRIVATE ROOM

## 2025-01-28 RX ORDER — HYDROCORTISONE 5 MG/1
10 TABLET ORAL 2 TIMES DAILY
Status: DISCONTINUED | OUTPATIENT
Start: 2025-01-30 | End: 2025-01-30 | Stop reason: HOSPADM

## 2025-01-28 RX ORDER — HYDROCORTISONE 5 MG/1
10 TABLET ORAL 2 TIMES DAILY
Status: DISCONTINUED | OUTPATIENT
Start: 2025-01-29 | End: 2025-01-28

## 2025-01-28 RX ORDER — POTASSIUM CHLORIDE 750 MG/1
30 CAPSULE, EXTENDED RELEASE ORAL
Status: COMPLETED | OUTPATIENT
Start: 2025-01-28 | End: 2025-01-28

## 2025-01-28 RX ADMIN — BENZTROPINE MESYLATE 1 MG: 1 TABLET ORAL at 08:01

## 2025-01-28 RX ADMIN — POTASSIUM CHLORIDE 30 MEQ: 750 CAPSULE, EXTENDED RELEASE ORAL at 06:01

## 2025-01-28 RX ADMIN — POTASSIUM CHLORIDE 30 MEQ: 750 CAPSULE, EXTENDED RELEASE ORAL at 01:01

## 2025-01-28 RX ADMIN — LEVETIRACETAM 500 MG: 100 INJECTION INTRAVENOUS at 08:01

## 2025-01-28 RX ADMIN — POTASSIUM CHLORIDE 30 MEQ: 750 CAPSULE, EXTENDED RELEASE ORAL at 11:01

## 2025-01-28 RX ADMIN — HYDROCORTISONE 25 MG: 5 TABLET ORAL at 11:01

## 2025-01-28 RX ADMIN — CHLORHEXIDINE GLUCONATE 0.12% ORAL RINSE 15 ML: 1.2 LIQUID ORAL at 08:01

## 2025-01-28 RX ADMIN — MUPIROCIN: 20 OINTMENT TOPICAL at 08:01

## 2025-01-28 RX ADMIN — QUETIAPINE FUMARATE 50 MG: 25 TABLET ORAL at 08:01

## 2025-01-28 RX ADMIN — Medication 100 MG: at 08:01

## 2025-01-28 RX ADMIN — DEXTROSE MONOHYDRATE 24 MILLION UNITS: 25000 INJECTION, SOLUTION INTRAVENOUS at 03:01

## 2025-01-28 RX ADMIN — Medication 400 MG: at 08:01

## 2025-01-28 RX ADMIN — ENOXAPARIN SODIUM 40 MG: 40 INJECTION SUBCUTANEOUS at 03:01

## 2025-01-28 RX ADMIN — HYDROCORTISONE 25 MG: 5 TABLET ORAL at 08:01

## 2025-01-28 RX ADMIN — FOLIC ACID-PYRIDOXINE-CYANOCOBALAMIN TAB 2.5-25-2 MG 1 TABLET: 2.5-25-2 TAB at 08:01

## 2025-01-28 NOTE — CARE UPDATE
I have reviewed the chart of Tresa Henry who is hospitalized for the following:    Active Hospital Problems    Diagnosis    *Encephalopathy, metabolic    Homelessness    Hypercoagulable state     Due to colon cancer      Pancytopenia    Agitation    Dementia with behavioral disturbance    Hypokalemia     Monitor with daily labs  replace      Quadriparesis     Therapy to evaluate and treat      Severe malnutrition    Acute encephalopathy    Bacteremia    Psychoses    Adrenal insufficiency    Syphilis    Tobacco dependency    Paralysis of right common peroneal nerve    Delirium    Hypernatremia    Secondary spontaneous pneumothorax    MSSA bacteremia    Influenza A with pneumonia    Acute hypoxemic respiratory failure    Drug dependence    Sepsis with acute organ dysfunction    Thrombocytopenia     Monitor with daily cbc      Colon cancer        Venita Quinones NP  Unit Based [facility]

## 2025-01-28 NOTE — PROCEDURES
DATE: 1/28/25    EEG NUMBER: FH     REFERRING PHYSICIAN:  Dr. León      This EEG was performed to assess for subclinical seizures      ELECTROENCEPHALOGRAM REPORT     METHODOLOGY:  Electroencephalographic (EEG) recording is with electrodes placed according to the International 10-20 placement system.  Thirty two (32) channels of digital signal are simultaneously recorded from the scalp and may include EKG, EMG, and/or eye monitors.   Recording band pass was 0.1 to 512 hz.  Digital video recording of the patient is simultaneously recorded with the EEG.  The nursing staff report clinical symptoms and may press an event button when the patient has symptoms of clinical interest to the treating physicians.  EEG and video recording is stored and archived in digital format.  The entire recording is visually reviewed, and the times identified by computer analysis as being spikes or seizures are reviewed again.  Activation procedures which include photic stimulation, hyperventilation and instructing patients to perform simple task are done in selected patients.   Compresses spectral analysis (CSA) is also performed on the activity recorded from each individual channel.  This is displayed as a power display of frequencies from 0 to 30 Hz over time.   The CSA analysis is done and displayed continuously.  This is reviewed for asymmetries in power between homologous areas of the scalp and for presence of changes in power which can be seen when seizures occur.  Sections of suspected abnormalities on the CSA is then compared with the original EEG recording.                Skyfiber software was also utilized in the review of this study.  This software suite analyzes the EEG recording in multiple domains.  Coherence and rhythmicity is computed to identify EEG sections which may contain organized seizures.  Each channel undergoes analysis to detect presence of spike and sharp waves which have special and morphological  characteristic of epileptic activity.  The routine EEG recording is converted from spacial into frequency domain.  This is then displayed comparing homologous areas to identify areas of significant asymmetry.  Algorithm to identify non-cortically generated artifact is used to separate eye movement, EMG and other artifact from the EEG.     EEG FINDINGS:  The recording was obtained with a number of standard bipolar and referential montages during wakefulness, drowsiness.  In the alert state, the posterior background rhythm was a symmetric, well-modulated 9 Hz activity, which reacted symmetrically to eye opening.  Intermittent photic stimulation failed to evoke symmetric posterior driving responses. No abnormalities were activated by photic stimulation.  Excessive theta range slowing was noted in both hemispheres.  During drowsiness, the background rhythm waxed and waned and there were periods of slowing.  There were no interictal epileptiform abnormalities and no clinical or electrographic seizures were recorded.    The EKG channel revealed a sinus rhythm.  Occasional PVCs were noted     IMPRESSION:  This is an abnormal EEG during wakefulness, drowsiness. Diffuse slowing was noted.      CLINICAL CORRELATION:  The patient is a 64-year-old female patient presented with altered sensorium and is currently maintained on levetiracetam.  This is an abnormal EEG during wakefulness and drowsiness.  The overall degree of slowing for given age is suggestive mild encephalopathy likely toxic metabolic encephalopathy.  There is no evidence of an epileptic process on this recording.  No seizures recorded during this study.

## 2025-01-28 NOTE — PROGRESS NOTES
EEG Hook up  No sign of skin breakdown noticed    Skin Integrity: Normal     Ranjith Navarro   01/28/2025 2:52 PM

## 2025-01-28 NOTE — PT/OT/SLP PROGRESS
"Speech Language Pathology Treatment    Patient Name:  Tresa Henry   MRN:  [id number]  Admitting Diagnosis: Encephalopathy, metabolic    Recommendations:                 General Recommendations:  Dysphagia therapy  Diet recommendations:  Minced & Moist Diet - IDDSI Level 5, Liquid Diet Level: Thin liquids - IDDSI Level 0   Aspiration Precautions:   1 bite/sip at a time  Assistance with meals  Eliminate distractions  Feed only when awake/alert  HOB to 90 degrees  Meds whole buried in puree  Strict aspiration precautions   General Precautions: Standard, aspiration, droplet, fall  Communication strategies:  none    Assessment:     Tresa Henry is a 64 y.o. female with an SLP diagnosis of Dysphagia. Further complicated by encephalopathy and waxing/waning mentation. SLP to continue to follow.      Subjective     Spoke with nursing prior to session. Pt found resting in bed upon SLP entry into room. Pt agreeable to participate in all aspects of session.        No pain reported     Respiratory Status: Room air    Objective:     Has the patient been evaluated by SLP for swallowing?   Yes      Pt seen for ongoing dysphagia therapy. Pt with improved alertness and response time this date compared to previous date of service. Pt seen during lunch meal. Pt agreeable to participating in PO intake at this time. She consumed cyclic sips from a straw of thin liquids without concern for aspiration or swallow difficulty. Pt reporting she often experiences "anorexia" with limited interest in solids. SLP offered rationale re: importance of participating in PO trials. Pt continues to politely refuse. SLP provided education regarding overall impressions,  minced moist  diet with thin liquids, safe swallow strategies, and ongoing SLP POC. Pt nodding in agreement however would continue to benefit from ongoing education. Spoke with RN regarding pt performance  and ongoing recommendations Whiteboard current speech to monitor.     Goals: "   Multidisciplinary Problems       SLP Goals          Problem: SLP    Goal Priority Disciplines Outcome   SLP Goal     SLP Progressing   Description: Speech Pathology Goals  To be met by 2/4/25    1. Pt will participate in ongoing diagnostic dysphagia therapy                              Plan:     Patient to be seen:  3 x/week   Plan of Care expires:  01/30/25  Plan of Care reviewed with:  patient   SLP Follow-Up:  Yes       Discharge recommendations:  High Intensity Therapy   Barriers to Discharge:  None    Time Tracking:     SLP Treatment Date:   01/28/25  Speech Start Time:  0932  Speech Stop Time:  0941     Speech Total Time (min):  9 min    Billable Minutes: Treatment Swallowing Dysfunction 9      01/28/2025

## 2025-01-28 NOTE — CONSULTS
[facility] Neuro[facility] ([facility])  Adult Nutrition  Progress Note    SUMMARY     Recommendations  1. Continue minced and moist diet with Boost Plus TID   2. Monitor weight and labs   3. Obtain Calorie count at RD follow up  4. Recommend Chance BID for sacral wound   5. Add Vit C 500mg BID to aid in wound healing   6. Continue folic acid and thiamine regimen     Goals: Pt will consume 75% of meals by RD follow up    Nutrition Goal Status: goal not met  Communication of RD Recs:  (POC)    Assessment and Plan  Nutrition Problem  Increased nutrient needs     Related to (etiology):   Increased demand for protein, energy 2/2 wound healing     Signs and Symptoms (as evidenced by):   Unstageable PI on sacrum      Interventions:  Collaboration by nutrition professional with other providers   EN   Modulars   Vitamins and minerals     Nutrition Diagnosis Status:   Continues    Malnutrition Assessment  Unable to assess, pt with AMS   Noted 12 lb weight loss in 4 months     Reason for Assessment  Reason For Assessment: RD follow-up  Diagnosis: infection/sepsis (Encephalopathy acute)  General Information Comments: Pt is currently on a minced and moist diet with Boost Plus TID. Jak-13/right upper chest, left heel, sacral spine. Noted 50% meal intake. Waxing/waning mentation. Pt with limited PO intake. Worsening dementia. Syphilis treatment. Noted 12 lb weight loss in 4 months. RD consulted for 72 hour calorie count. Unable to conduct NFPE at this time, pt with AMS.  Nutrition Discharge Planning: as per medical course    Nutrition/Diet History  Nutrition Intake History: [facility]  Food Preferences: [facility]  Spiritual, Cultural Beliefs, Catholic Practices, Values that Affect Care: no  Food Allergies: NKFA  Factors Affecting Nutritional Intake: impaired cognitive status/motor control, NPO    Food Insecurity: No Food Insecurity (1/23/2025)    Hunger Vital Sign     Worried About Running Out of Food in the Last Year: Never true     Ran Out of  "Food in the Last Year: Never true     Anthropometrics  Height: 5' 7" (170.2 cm)  Height (inches): 67 in  Height Method: Stated  Weight: 64 kg (141 lb 1.5 oz)  Weight (lb): 141.1 lb  Weight Method: Bed Scale  Ideal Body Weight (IBW), Female: 135 lb  % Ideal Body Weight, Female (lb): 104.52 %  BMI (Calculated): 22.1  Usual Body Weight (UBW), k.9 kg (10/27/24)  % Usual Body Weight: 107.07     Lab/Procedures/Meds  Pertinent Labs Reviewed: reviewed  Pertinent Labs Comments: K 3.1, , BUN 29, Ca 7.5, AST 46  Pertinent Medications Reviewed: reviewed  Pertinent Medications Comments: enoxaparin, folic acid, hydrocortisone, magnesium oxide    Estimated/Assessed Needs  Weight Used For Calorie Calculations: 64.1 kg (141 lb 5 oz)  Energy Calorie Requirements (kcal): 1923 kcal  Energy Need Method: Kcal/kg (30 kcal/kg (skin wounds present))  Protein Requirements: 77- 96g (1.2- 1.5g/kg)  Weight Used For Protein Calculations: 64.1 kg (141 lb 5 oz)  Fluid Requirements (mL): as per MD or RDA  Estimated Fluid Requirement Method: RDA Method  RDA Method (mL): 1923     Nutrition Prescription Ordered  Current Diet Order: Minced and Moist Diet  Current Nutrition Support Formula Ordered:  (-)  Current Nutrition Support Rate Ordered: 0 (ml)  Oral Nutrition Supplement: Boost Plus TID    Evaluation of Received Nutrient/Fluid Intake  I/O: 350/0  Energy Calories Required: not meeting needs  Protein Required: not meeting needs  Fluid Required: not meeting needs  Comments: LB-  Tolerance: tolerating  % Intake of Estimated Energy Needs: 50 - 75 %  % Meal Intake: 50 - 75 %    Nutrition Risk  Level of Risk/Frequency of Follow-up: high (2x/week)     Monitor and Evaluation  Food and Nutrient Intake: energy intake, enteral nutrition intake  Food and Nutrient Adminstration: enteral and parenteral nutrition administration  Physical Activity and Function: nutrition-related ADLs and IADLs  Anthropometric Measurements: weight, weight change, " body mass index  Biochemical Data, Medical Tests and Procedures: electrolyte and renal panel, gastrointestinal profile, glucose/endocrine profile, inflammatory profile, lipid profile  Nutrition-Focused Physical Findings: overall appearance, skin     Nutrition Follow-Up  RD Follow-up?: Yes

## 2025-01-28 NOTE — PLAN OF CARE
Problem: Adult Inpatient Plan of Care  Goal: Absence of Hospital-Acquired Illness or Injury  Outcome: Progressing     Problem: Adult Inpatient Plan of Care  Goal: Patient-Specific Goal (Individualized)  Outcome: Progressing     Problem: Adult Inpatient Plan of Care  Goal: Plan of Care Review  Outcome: Progressing     Problem: Wound  Goal: Optimal Coping  Outcome: Progressing

## 2025-01-28 NOTE — ASSESSMENT & PLAN NOTE
Per daughter patient has had work up for memory loss a year ago. Progressively worsened after she lost her home in November.

## 2025-01-28 NOTE — PLAN OF CARE
"[facility] Plan of Care Note    Dx: Acute encephalopathy [G93.40]    Shift Events: Pt lost all iv access. Midline consult in place     Goals of Care: Safety    Neuro: Disoriented    Vital Signs: BP (!) 178/91   Pulse 82   Temp 97.4 °F (36.3 °C) (Oral)   Resp 18   Ht 5' 7" (1.702 m)   Wt 64 kg (141 lb 1.5 oz)   SpO2 95%   Breastfeeding No   BMI 22.10 kg/m²     Respiratory: RA    Diet: Diet Minced & Moist (IDDSI Level 5) Thin; Standard Tray  Dietary nutrition supplements Boost Plus Nutritional Drink - Any flavor    Is patient tolerating current diet? No    GTTS: NA    Urine Output/Bowel Movement:     Intake/Output Summary (Last 24 hours) at 1/27/2025 1838  Last data filed at 1/27/2025 1100  Gross per 24 hour   Intake 800 ml   Output 900 ml   Net -100 ml          Drains/Tubes/Tube Feeds (include total output/shift):   Output by Drain (mL) 01/25/25 0701 - 01/25/25 1900 01/25/25 1901 - 01/26/25 0700 01/26/25 0701 - 01/26/25 1900 01/26/25 1901 - 01/27/25 0700 01/27/25 0701 - 01/27/25 1838   Patient has no LDAs of requested type attached.          Lines:       Accuchecks:NA    Skin: multiple bruise sites    Fall Risk Score: See flowsheets    Activity level? See flowsheets    Any scheduled procedures? NA    Any safety concerns? Falls    Other: NA   "

## 2025-01-28 NOTE — ASSESSMENT & PLAN NOTE
Not sure if from IV hydrocortisone  If BPs consistently still elevated, when start antihypertensive medicine

## 2025-01-28 NOTE — PT/OT/SLP PROGRESS
"Occupational Therapy   Treatment    Name: Tresa Henry  MRN: [id number]  Admitting Diagnosis:  Encephalopathy, metabolic       Recommendations:     Discharge Recommendations: Moderate Intensity Therapy  Discharge Equipment Recommendations:  lift device, hospital bed, wheelchair  Barriers to discharge:  Other (Comment) (increased skilled A needed)    Assessment:     Tresa Henry is a 64 y.o. female with a medical diagnosis of Encephalopathy, metabolic.  She presents with improved arousal during OT session this date, though with continued lethargy present. Performance deficits affecting function are weakness, gait instability, decreased upper extremity function, impaired balance, decreased lower extremity function, impaired endurance, impaired coordination, decreased safety awareness, impaired fine motor, impaired self care skills, impaired cognition, pain, impaired functional mobility, decreased coordination, abnormal tone. Pt A&O x2 (person and place) stating it was "1965" with <25% of commands followed. Pt with increased participation in conversation this date with eyes open 50% of session, improved from previous date. Pt continues to require significant assistance for all self-care and mobility activities. Pt stating she is unable to lift BUEs though observed to utilize BUEs to wash face with elbow flexion WFL. Due to continued assistance needed, pt would continue to benefit from skilled OT services. Patient continues to demonstrate the need for moderate intensity therapy on a daily basis post acute exhibited by decreased independence with self-care and functional mobility    Additional Staff Present: Therapy tech utilized during session due to patient complexity and required physical assistance to ensure patient safety      Rehab Prognosis:  Fair; patient would benefit from acute skilled OT services to address these deficits and reach maximum level of function.       Plan:     Patient to be seen 4 x/week " "to address the above listed problems via self-care/home management, therapeutic activities, therapeutic exercises, neuromuscular re-education  Plan of Care Expires: 02/24/25  Plan of Care Reviewed with: patient    Subjective     Chief Complaint: "can yall do something to help me out this bed?" "I can't" (pt response to therapist asking if pt can daljit footwear, as well as to assist with bed mobility)  Patient/Family Comments/goals: unknown   Pain/Comfort:  Pain Rating 1: other (see comments) (pt subjectively indicating pain though not quantitatively rated)  Pain Addressed 1: Reposition, Distraction    Objective:     Communicated with: nursing prior to session.  Patient found HOB elevated with telemetry, bed alarm, PureWick, pressure relief boots upon OT entry to room.    General Precautions: Standard, aspiration, fall    Orthopedic Precautions:N/A  Braces: N/A  Respiratory Status: Room air     Occupational Performance:     Bed Mobility:    Patient completed Scooting/Bridging with total assistance  Patient completed Supine to Sit with total assistance and 2 persons - pt encouraged to assist though with decreased assistance provided 2/2 generalized weakness  Patient completed Sit to Supine with total assistance and 2 persons   Pt seated EOB with fluctuating assistance levels needed- total assistance w/o BUE support with multidirectional lean though favoring R lateral lean, w/ BUE support pt briefly seated EOB with CGA  Pt encouraged with maximal tactile and verbal cues to increase core activation and BUE support to increase sitting EOB independence  Pt encouraged to complete activities while EOB though pt stating inability to lift BUEs    Functional Mobility/Transfers:  Not trailed 2/2 pt with decreased AROM of BLEs and fluctuation postural control    Activities of Daily Living:  Grooming: moderate assistance for face washing thoroughness while supine with HOB elevated, pt noted to wash face with BUEs for short " duration  Lower Body Dressing: total assistance to daljit footwear 2/2 pt unwilling to attempt      AMPAC 6 Click ADL: 9    Treatment & Education:  Session this date targets self-care and therapeutic activities to increase pt's independence  Pt educated on OT roles, POC, call button for assistance  Pt educated on importance of participation in OT sessions to increase independence    Patient left HOB elevated with all lines intact, call button in reach, bed alarm on, and nursing notified    GOALS:   Multidisciplinary Problems       Occupational Therapy Goals          Problem: Occupational Therapy    Goal Priority Disciplines Outcome Interventions   Occupational Therapy Goal     OT, PT/OT Progressing    Description: Goals to be met by: 2/24/25     Patient will increase functional independence with ADLs by performing:    UE Dressing with Moderate Assistance.  LE Dressing with Moderate Assistance.  Grooming while EOB with Moderate Assistance.  Toileting from bedside commode with Moderate Assistance for hygiene and clothing management.   Sitting at edge of bed x15 minutes with Moderate Assistance.  Supine to sit with Moderate Assistance.  Stand pivot transfers with Maximum Assistance.  Step transfer with Total Assistance  Toilet transfer to bedside commode with Maximum Assistance.                         DME Justifications:  Tresa requires a hospital bed due to her requiring positioning of the body in ways not feasible with an ordinary bed due to limited ability and cannot independently make changes in body position without the use of the bed.The positioning of the body cannot be sufficiently resolved by the use of pillows and wedges.  Tresa Henry has a mobility limitation that significantly impairs her ability to participate in one or more mobility related activities of daily living (MRADLs) such as toileting, feeding, dressing, grooming, and bathing in customary locations in the home.  The mobility limitation cannot be  sufficiently resolved by the use of a cane or walker.   The use of a manual wheelchair will significantly improve the patients ability to participate in MRADLS and the patient will use it on regular basis in the home.  Tresa Henry has expressed her willingness to use a manual wheelchair in the home. Patients upper body strength is sufficient for propulsion.  He/She also has a caregiver who is available, willing, and able to provide assistance with the wheelchair when needed.      Time Tracking:     OT Date of Treatment: 01/28/25  OT Start Time: 1320  OT Stop Time: 1338  OT Total Time (min): 18 min    Billable Minutes:Therapeutic Activity 18    OT/BERNARD: OT          1/28/2025

## 2025-01-28 NOTE — ASSESSMENT & PLAN NOTE
C/f polysubstance abuse. UDS 01/05 presumed positive for THC. Completed Klonopin taper per Psych at OSH for potential benzo withdrawal. Not currently active use. Patient does not have mental or physical capacity to seek, find and take drugs.

## 2025-01-28 NOTE — PLAN OF CARE
Recommendations  1. Continue minced and moist diet with Boost Plus TID   2. Monitor weight and labs   3. Obtain Calorie count at RD follow up  4. Recommend Chance BID for sacral wound   5. Add Vit C 500mg BID to aid in wound healing   6. Continue folic acid and thiamine regimen     Goals: Pt will consume 75% of meals by RD follow up

## 2025-01-28 NOTE — PROGRESS NOTES
Hospital Medicine  Progress note    Team: [facility] Krista León MD  Admit Date: 1/20/2025  Code status: Full Code    Principal Problem:  Encephalopathy, metabolic    Interval hx: Patient alert today    PEx  Temp:  [97.4 °F (36.3 °C)-98.3 °F (36.8 °C)]   Pulse:  []   Resp:  [18-20]   BP: (148-178)/(85-94)   SpO2:  [95 %-98 %]   No intake or output data in the 24 hours ending 01/28/25 1434      General Appearance: no acute distress, WD, chronically ill-appeaing  Heart: regular rate and rhythm, no heave  Respiratory: Normal respiratory effort, symmetric excursion, bilateral vesicular breath sounds   Abdomen: Soft, non-tender; bowel sounds active  Skin: intact, no rash, no ulcers  Neurologic:  No focal numbness or weakness  Mental status: Alert, oriented to name only, affect appropriate    Recent Labs   Lab 01/26/25 0434 01/27/25 0323 01/28/25 0433   WBC 2.56* 4.44 1.85*   HGB 8.4* 10.7* 8.9*   HCT 25.9* 32.2* 27.3*   PLT 89* 137* 102*     Recent Labs   Lab 01/26/25 0434 01/27/25 0323 01/28/25 0433    144 144   K 3.1* 2.8* 3.1*    108 112*   CO2 24 23 24   BUN 38* 36* 29*   CREATININE 0.8 0.8 0.8   GLU 84 94 94   CALCIUM 7.5* 8.0* 7.5*   MG 1.6 1.7 1.7   PHOS 3.5 3.2 3.5     Recent Labs   Lab 01/26/25 0434 01/27/25 0323 01/28/25 0433   ALKPHOS 50 65 54   ALT 5* 13 12   AST 39 60* 46*   ALBUMIN 2.1* 2.5* 2.2*   PROT 5.5* 6.4 5.5*   BILITOT 0.5 0.8 0.6        Scheduled Meds:   benztropine  1 mg Per NG tube BID    chlorhexidine  15 mL Mouth/Throat BID    enoxparin  40 mg Subcutaneous Q24H (prophylaxis, 1700)    folic acid-vit B6-vit B12 2.5-25-2 mg  1 tablet Oral Daily    hydrocortisone  25 mg Oral BID    Followed by    [START ON 1/30/2025] hydrocortisone  10 mg Oral BID    levETIRAcetam (Keppra) IV (PEDS and ADULTS)  500 mg Intravenous BID    magnesium oxide  400 mg Oral BID    penicillin G potassium 24 Million Units in D5W 500 mL CONTINUOUS INFUSION  24 Million Units Intravenous Q24H     potassium chloride  30 mEq Oral with breakfast    QUEtiapine  50 mg Oral QHS    thiamine  100 mg Oral Daily     Continuous Infusions:  As Needed:    Current Facility-Administered Medications:     0.9%  NaCl infusion (for blood administration), , Intravenous, Q24H PRN    acetaminophen, 650 mg, Per NG tube, Q4H PRN    hydrALAZINE, 25 mg, Oral, Q4H PRN    naloxone, 0.02 mg, Intravenous, PRN    QUEtiapine, 25 mg, Oral, Q6H PRN    sodium chloride 0.9%, 10 mL, Intravenous, Q12H PRN    Assessment and Plan  / Problems managed today    * Encephalopathy, metabolic  Found by EMS with high suspicion for substance use. UDS THC presumed positive. CT head without acute intracranial pathology. HIV hegative, RPR positive. Hx of untreated Hep C. Collateral history provided by sister in the chart reports pt with history of worsening dementia however current mentation is not baseline. Prior to admission, sister reports that patient experience cough and fevers. Likely multifactorial: metabolic secondary to electrolyte disturbances, sepsis and/or substance abuse. Likely compounded by delirium given prolonged critical admission. Neurology consulted, agree with multifactorial etiology. Will f/u in clinic at NH. Mentation improving, though still oriented x1 only.     - neurochecks  - aspiration, delirium and fall precautions  - Continued correction of metabolic parameters, including azotemia, hyperammonia, hypocalcemia and hypernatremia (now resolved)  - ID consulted for possible syphilis tx   - Delirium precautions  - empiric high dose IV thiamine for 5 days, followed by 100mg po    1/27 Dramatic changed in [place]. CT head negative for acute changed  Maybe non-epileptic status? EEG  Psych medication related or psych related ? Psychiatry consulted  Reported history of dementia - is the rapidly progressing...CJD or lewey body dementia? Neurology consulted  Will start treatment for neurosyphillis given persistently positive syphilis  testing    Improved mentation the following day    Likely with delirium on dementia  Rule other causes as above as patient with rapid decline and now total care    Elevated blood pressure reading  Not sure if from IV hydrocortisone  If BPs consistently still elevated, when start antihypertensive medicine    Homelessness  Per daughter, patient has no home.    Schizophrenia  Psychiatry saw patient - zyprexa and cogentin stopped  Quetiapine 50 mg qhs    Severe malnutrition  Minimal intake since admission  Oral intake not improved as her alertness improved  Nursing to assist with all feedings  She may need PEG at this time - calorie count    Quadriparesis  Requires mod to max assist for all mobility, transfers and ADLs    Hypokalemia  Patient's most recent potassium results are listed below.   Recent Labs     01/25/25  1015 01/26/25  0434 01/27/25  0323   K 2.9* 3.1* 2.8*     Plan  - Replete potassium per protocol  - Monitor potassium Daily  - Patient's hypokalemia is worsening. Will adjust treatment as follows: IV replacement through fluids    Dementia with psychotic disturbance  Per daughter patient has had work up for memory loss a year ago. Progressively worsened after she lost her home in November.     Adrenal insufficiency  Relative adrenal insufficiency  Hydrocortisone started during sepsis three weeks ago  Wean off over a weak    Will not treat associated elevated blood pressures    Syphilis  9/2023 FTA Abs weekly positive; 8/18/2024 FTA reactive and weakly reactive per office of public health - she was not treated as felt to be false positives  RPR 1:1 here  ID saw patient. Recommended treatments based on our suspicion of syphilis or neurosyphilis  Given terrible mental status and little improvement since admission will treat for neurosyphilis - continuous penicillin G 24 million units daily until enteral intake is stable and sustained (then can switch to doxycyline 100 mg  BID) for 21 days total.    Paralysis  of right common peroneal nerve  -- will f/u in neuro clinic at [facility]       Tobacco dependency  Dangers of cigarette smoking were reviewed with patient in detail. Patient was Counseled for 3-10 minutes. Nicotine replacement options were discussed. Nicotine replacement was discussed- not prescribed per patient's request    Delirium  Patient with acute delirium. There is no specific treatment. Will avoid narcotics/benzos that are known to worsen condition and add PRN antipsychotics to limit behaviors of self harm. Monitor closely.  Fall and delirium precautions.     Hypernatremia  Hypernatremia is likely due to Dehydration. The patient's most recent sodium results are listed below.  Recent Labs     01/25/25  1015 01/26/25  0434 01/27/25  0323    144 144     Plan  - Aim to correct the sodium by 8-10mEq in 24 hours.   - Will plan to trend the patient's sodium: Daily  - The patient's hypernatremia is stable  - encourage PO now that patient is more awake. Can give additional IVF if continued hypernatremia despite PO intake   Due to poor intake. May need to consider PEG is she keeps requiring IV hydration    Secondary spontaneous pneumothorax  S/p chest tube placement and adequate re-expansion on repeat CXR. Etiology of pneumothorax potentially from substance inhalation causing lung damage overtime. CT c/a/p 10/2024 with evidence of aerated lungs; however severe lung damage with evidence of ephysema and opacification noted on most recent CT.     MSSA bacteremia  Bxc 01/05 MSSA. Was on Vanc and Zosyn; however transitioned to Vanc and Meropenem due to thrombocytopenia per ID.      - on ancef at last hospital, cont for now   - f/u echo negative for vegetation  - ID consulted, appreciate recs . Completed treatment    Influenza A with pneumonia  RIP 01/05 positive for influenza A. Completed Tamiflu.     Drug dependence  C/f polysubstance abuse. UDS 01/05 presumed positive for THC. Completed Klonopin taper per Psych at OSH for  potential benzo withdrawal. Not currently active use. Patient does not have mental or physical capacity to seek, find and take drugs.     Sepsis with acute organ dysfunction  Strep pneumo urine antigen positive   MSSA bacteremia; follow-up Bcx cleared  Influenza A positive  UA negative for infectious etiology     Placed on Vanc and Zosyn --> Cefazolin and Zosyn --> Vanc and Rm (changed per ID due to thrombocytopenia and spiking fevers on 01/11). Completed Tamiflu. ID deferred treatment of the maame dubliniensis respiratory culture.     - TTE negative for endocarditis  - completed two week course of cefazolin    Colon cancer  -h/o noted           Diet:  dental soft  GI PPx: not needed  DVT PPx:  enoxaparin  Airways: room air  Wounds: none    Goals of Care:  Return to prior functional status     Discharge Planning   NICK: 2/5/2025   Is the patient medically ready for discharge?:     Reason for patient still in hospital (select all that apply): Patient trending condition and Treatment  Discharge Plan A: Rehab   Discharge Delays: None known at this time    Krista León MD

## 2025-01-28 NOTE — PROGRESS NOTES
"CONSULTATION LIAISON PSYCHIATRY PROGRESS NOTE    Patient Name: Tresa Henry  MRN: [id number]  Patient Class: IP- Inpatient  Admission Date: 1/20/2025  Attending Physician: Krista León MD      SUBJECTIVE:   Tresa Henry is a 64 y.o. female with past psychiatric history of schizophrenia spectrum vs bipolar disorder w psychotic features, methamphetamine use and cannabis use & past pertinent medical history of HLD, hep C, colon cancer s/p resection presents to the ED/admitted to the hospital for AMS.     Patient presented to OSH on 1/5 after EMS was contacted by unknown caller expressing concern for patient. On arrival, EMS found several people at patient's house intoxication. Patient noted to be confused on arrival and was taken to ED for evaluation. UDS + THC. She was found to be bacteremic and influenza positive with an HUSEYIN and electrolyte derangements. Patient's mentation improved but she subsequently became more agitated and was found to have pneumothorax. She was intubated for treatment and extubated on 1/14. She was transferred to [facility] for further management of acute encephalopathy.     Psychiatry consulted for "history of schizophrenia, but with dramatic waxing and waning mental status. Can we hold her psych meds to rule them out as cause? She is much less responsive and she has catatonia?"     Interval History: NAEON. No PRNs administered.    Today, patient displays improved mental status compared to yesterdays exam. She is oriented to self and place (hospital), but not time. She is more able to spell "house" backwards relative to yesterday. She then asks what she is going to do with her life. Asked who she lives with she states her  (per chart review this is not true). She reports that she hears voices that "tell [her] to do things" but she denies CAH to hurt herself or others. She states that she has visual hallucinations of shadows that disappear when she looks at them. She denies any " "other concerns or complaints.        OBJECTIVE:    Mental Status Exam:  General Appearance: poorly groomed, disheveled  Behavior: cooperative  Involuntary Movements and Motor Activity: no abnormal involuntary movements noted; no tics, no tremors, no akathisia, no dystonia, no evidence of tardive dyskinesia; no psychomotor agitation or retardation  Gait and Station: unable to assess - patient lying down or seated  Speech and Language: normal rate, normal rhythm, normal volume  Mood: "Better"  Affect: constricted  Thought Process and Associations: disorganized  Perceptual Disturbances: hallucinations:  auditory and visual  Thought Content and Perceptions:: no suicidal ideation, + auditory hallucinations, + visual hallucinations  Sensorium and Orientation: delirious, waxing and waning  Recent and Remote Memory: impaired  Attention and Concentration:  improved attentiveness to conversation, spells house forward, incorrectly spelled backward  Fund of Knowledge: impaired  Insight: impaired  Judgment: limited    CAM ICU positive? yes      ASSESSMENT & RECOMMENDATIONS   Unspecified schizophrenia spectrum and other psychotic disorders  Unspecified delirium   H/O methamphetamine use disorder     Scheduled Medication(s):  Discontinue benztropine as anticholinergic medications can worsen delirium   Continue Seroquel 50 mg nightly for treatment of delirium and h/o psychotic disorder     PRN Medication(s):  Seroquel 25 mg q6hr PRN for non-redirectable agitation.      Other Recommendations (labs, imaging, further consults, etc.):  Please provide patient assistance with meals       Delirium Behavior Management  PLEASE utilize PRN meds first for agitation. Minimize use of PHYSICAL restraints OR have periods of being out of physical restraints if possible.  Keep window shades open and room lit during day and room dim at night in order to promote normal sleep-wake cycles  Encourage family at bedside. [place] patient often to situation, " location, date.  Continue to Limit or Discontinue use of Narcotics, Benzos and Anti-cholinergic medications as they may worsen delirium.  Continue medical workup for causative etiology of Delirium.     Medical dx complicating psych dx  Syphilis  - RPR 1:1   - Started on empiric neurosyphilis treatment with penicillin  - recommend Neurology consult and LP to r/o active infx and possible Neurosyphilis      Sepsis, resolved              - MSSA bacteremia s/p Abx, subsequent Bcx clear  - UA without LE, nitrates, pyuria      Adrenal insufficiency   - 2/2 sepsis   - on steroid taper      Risk Assessment / Legal Status  Patient does not meet criteria for PEC or involuntary inpatient psychiatric admission at this time. Recommend to rescind PEC if one was placed. Patient is not currently an imminent danger to self or others and is not gravely disabled due to a psychiatric illness.    Follow-up:  Will follow-up while in house.    Disposition:   Defer to primary team.    Please contact ON CALL psychiatry service (24/7) for any acute issues that may arise.    Jer Teresa MD, PhD  [facility], PGY-2   [facility]  1/28/2025 2:05 PM        --------------------------------------------------------------------------------------------------------------------------------------------------------------------------------------------------------------------------------------    CONTINUED OBJECTIVE clinical data & findings reviewed and noted for above decision making    Current Medications:   Scheduled Meds:    benztropine  1 mg Per NG tube BID    chlorhexidine  15 mL Mouth/Throat BID    enoxparin  40 mg Subcutaneous Q24H (prophylaxis, 1700)    folic acid-vit B6-vit B12 2.5-25-2 mg  1 tablet Oral Daily    hydrocortisone  25 mg Oral BID    Followed by    [START ON 1/30/2025] hydrocortisone  10 mg Oral BID    levETIRAcetam (Keppra) IV (PEDS and ADULTS)  500 mg Intravenous BID    magnesium oxide  400 mg  Oral BID    penicillin G potassium 24 Million Units in D5W 500 mL CONTINUOUS INFUSION  24 Million Units Intravenous Q24H    potassium chloride  30 mEq Oral with breakfast    QUEtiapine  50 mg Oral QHS    thiamine  100 mg Oral Daily     PRN Meds:   Current Facility-Administered Medications:     0.9%  NaCl infusion (for blood administration), , Intravenous, Q24H PRN    acetaminophen, 650 mg, Per NG tube, Q4H PRN    hydrALAZINE, 25 mg, Oral, Q4H PRN    naloxone, 0.02 mg, Intravenous, PRN    QUEtiapine, 25 mg, Oral, Q6H PRN    sodium chloride 0.9%, 10 mL, Intravenous, Q12H PRN    Allergies:   Review of patient's allergies indicates:   Allergen Reactions    Haldol [haloperidol lactate] Hallucinations    Divalproex Rash    Risperdal [risperidone] Palpitations       Vitals  Vitals:    01/28/25 1127   BP: (!) 148/92   Pulse: 106   Resp: 20   Temp: 98.1 °F (36.7 °C)       Labs/Imaging/Studies:  Recent Results (from the past 24 hours)   Phosphorus    Collection Time: 01/28/25  4:33 AM   Result Value Ref Range    Phosphorus 3.5 2.7 - 4.5 mg/dL   Magnesium    Collection Time: 01/28/25  4:33 AM   Result Value Ref Range    Magnesium 1.7 1.6 - 2.6 mg/dL   Comprehensive Metabolic Panel    Collection Time: 01/28/25  4:33 AM   Result Value Ref Range    Sodium 144 136 - 145 mmol/L    Potassium 3.1 (L) 3.5 - 5.1 mmol/L    Chloride 112 (H) 95 - 110 mmol/L    CO2 24 23 - 29 mmol/L    Glucose 94 70 - 110 mg/dL    BUN 29 (H) 8 - 23 mg/dL    Creatinine 0.8 0.5 - 1.4 mg/dL    Calcium 7.5 (L) 8.7 - 10.5 mg/dL    Total Protein 5.5 (L) 6.0 - 8.4 g/dL    Albumin 2.2 (L) 3.5 - 5.2 g/dL    Total Bilirubin 0.6 0.1 - 1.0 mg/dL    Alkaline Phosphatase 54 40 - 150 U/L    AST 46 (H) 10 - 40 U/L    ALT 12 10 - 44 U/L    eGFR >60.0 >60 mL/min/1.73 m^2    Anion Gap 8 8 - 16 mmol/L   CBC Auto Differential    Collection Time: 01/28/25  4:33 AM   Result Value Ref Range    WBC 1.85 (LL) 3.90 - 12.70 K/uL    RBC 2.96 (L) 4.00 - 5.40 M/uL    Hemoglobin 8.9 (L)  12.0 - 16.0 g/dL    Hematocrit 27.3 (L) 37.0 - 48.5 %    MCV 92 82 - 98 fL    MCH 30.1 27.0 - 31.0 pg    MCHC 32.6 32.0 - 36.0 g/dL    RDW 14.9 (H) 11.5 - 14.5 %    Platelets 102 (L) 150 - 450 K/uL    MPV 10.1 9.2 - 12.9 fL    Immature Granulocytes CANCELED 0.0 - 0.5 %    Immature Grans (Abs) CANCELED 0.00 - 0.04 K/uL    nRBC 0 0 /100 WBC    Gran % 80.0 (H) 38.0 - 73.0 %    Lymph % 12.0 (L) 18.0 - 48.0 %    Mono % 8.0 4.0 - 15.0 %    Eosinophil % 0.0 0.0 - 8.0 %    Basophil % 0.0 0.0 - 1.9 %    Platelet Estimate Decreased (A)     Aniso Slight     Poik Slight     Hypo Occasional     Differential Method Manual

## 2025-01-28 NOTE — PLAN OF CARE
64 y.o. female with psych hx of schizophrenia vs. Bipolar with psychotic features, polysubstance abuse, HCV, colon cancer s/p resection (2022) and dementia presented 1/5/25 at OSH with AMS. She was admitted with MSSA bacteremia, influenza A, multifocal pneumonia, HUSEYIN and ARDS requiring intubation, R sided pneumothorax s/p chest tube placement. Neurology initially consulted 1/21 for encephalopathy. Presentation felt c/w multifactorial encephalopathy from infections (bacteremia and influenza A), metabolic derangements (azotemia, hyponatremia), possible neurotoxicity (meropenem) and possible neurosyphilis superimposed on poor cognitive reserve. ID was consulted given +RPR and FTA Abs dating back to August 2023, unclear whether she had been treated previously or not so she was started on CNS penetration dosing Pen G x 3 weeks per ID. Neurology also asked to weigh in on the possibility of CJD given dementia and acute worsening this admission. Given MRI brain W WO contrast (1/20/25) without evidence of cortical ribboning, exam without myoclonus or hyperekplexia and EEG (1/24/25) without characteristic periodic sharp wave complexes, our suspicion for CJD is low. We do not feel strongly about repeating a LP at this time. We feel her presentation is most fitting with multifactorial encephalopathy with a component of hospital acquired delirium after a prolonged stay superimposed on poor cognitive reserve. Recommend supportive care and continued management of infections per primary team and ID. Outpatient Neurology follow-up for formal cognitive assessment to better identify underlying dementia.   Please call with any questions or clarifications.     Henrietta Araiza PA-C  General Neurology Consult

## 2025-01-28 NOTE — PLAN OF CARE
Patient is moderate intensity therapy level.  Blast referral sent to Skilled Nursing facilities in patient home area.  Preference to be obtained once we know who can accept the patient.    01/28/25 2885   Post-Acute Status   Post-Acute Authorization Placement   Post-Acute Placement Status Referrals Sent

## 2025-01-29 PROBLEM — T14.8XXA BLOOD BLISTER: Status: ACTIVE | Noted: 2025-01-29

## 2025-01-29 PROBLEM — E83.39 HYPOPHOSPHATEMIA: Status: ACTIVE | Noted: 2025-01-29

## 2025-01-29 PROBLEM — L89.153 PRESSURE INJURY OF SACRAL REGION, STAGE 3: Status: ACTIVE | Noted: 2025-01-29

## 2025-01-29 LAB
ALBUMIN SERPL BCP-MCNC: 2.5 G/DL (ref 3.5–5.2)
ALP SERPL-CCNC: 62 U/L (ref 40–150)
ALT SERPL W/O P-5'-P-CCNC: 13 U/L (ref 10–44)
ANION GAP SERPL CALC-SCNC: 8 MMOL/L (ref 8–16)
AST SERPL-CCNC: 41 U/L (ref 10–40)
BASOPHILS # BLD AUTO: 0 K/UL (ref 0–0.2)
BASOPHILS NFR BLD: 0 % (ref 0–1.9)
BILIRUB SERPL-MCNC: 0.8 MG/DL (ref 0.1–1)
BUN SERPL-MCNC: 28 MG/DL (ref 8–23)
CALCIUM SERPL-MCNC: 8 MG/DL (ref 8.7–10.5)
CHLORIDE SERPL-SCNC: 109 MMOL/L (ref 95–110)
CO2 SERPL-SCNC: 22 MMOL/L (ref 23–29)
CREAT SERPL-MCNC: 0.7 MG/DL (ref 0.5–1.4)
DIFFERENTIAL METHOD BLD: ABNORMAL
EOSINOPHIL # BLD AUTO: 0 K/UL (ref 0–0.5)
EOSINOPHIL NFR BLD: 0 % (ref 0–8)
ERYTHROCYTE [DISTWIDTH] IN BLOOD BY AUTOMATED COUNT: 16 % (ref 11.5–14.5)
EST. GFR  (NO RACE VARIABLE): >60 ML/MIN/1.73 M^2
GLUCOSE SERPL-MCNC: 103 MG/DL (ref 70–110)
HCT VFR BLD AUTO: 33.1 % (ref 37–48.5)
HGB BLD-MCNC: 11.2 G/DL (ref 12–16)
IMM GRANULOCYTES # BLD AUTO: 0.02 K/UL (ref 0–0.04)
IMM GRANULOCYTES NFR BLD AUTO: 0.4 % (ref 0–0.5)
LYMPHOCYTES # BLD AUTO: 0.9 K/UL (ref 1–4.8)
LYMPHOCYTES NFR BLD: 18.5 % (ref 18–48)
MAGNESIUM SERPL-MCNC: 1.8 MG/DL (ref 1.6–2.6)
MCH RBC QN AUTO: 30.8 PG (ref 27–31)
MCHC RBC AUTO-ENTMCNC: 33.8 G/DL (ref 32–36)
MCV RBC AUTO: 91 FL (ref 82–98)
MONOCYTES # BLD AUTO: 0.3 K/UL (ref 0.3–1)
MONOCYTES NFR BLD: 7 % (ref 4–15)
NEUTROPHILS # BLD AUTO: 3.5 K/UL (ref 1.8–7.7)
NEUTROPHILS NFR BLD: 74.1 % (ref 38–73)
NRBC BLD-RTO: 0 /100 WBC
PHOSPHATE SERPL-MCNC: 2.6 MG/DL (ref 2.7–4.5)
PLATELET # BLD AUTO: 171 K/UL (ref 150–450)
PMV BLD AUTO: 10.1 FL (ref 9.2–12.9)
POTASSIUM SERPL-SCNC: 4.8 MMOL/L (ref 3.5–5.1)
PROT SERPL-MCNC: 6.5 G/DL (ref 6–8.4)
RBC # BLD AUTO: 3.64 M/UL (ref 4–5.4)
SODIUM SERPL-SCNC: 139 MMOL/L (ref 136–145)
WBC # BLD AUTO: 4.7 K/UL (ref 3.9–12.7)

## 2025-01-29 PROCEDURE — 83735 ASSAY OF MAGNESIUM: CPT

## 2025-01-29 PROCEDURE — 25000003 PHARM REV CODE 250

## 2025-01-29 PROCEDURE — 97535 SELF CARE MNGMENT TRAINING: CPT

## 2025-01-29 PROCEDURE — 97112 NEUROMUSCULAR REEDUCATION: CPT

## 2025-01-29 PROCEDURE — 25000003 PHARM REV CODE 250: Performed by: INTERNAL MEDICINE

## 2025-01-29 PROCEDURE — 85025 COMPLETE CBC W/AUTO DIFF WBC: CPT

## 2025-01-29 PROCEDURE — 36415 COLL VENOUS BLD VENIPUNCTURE: CPT

## 2025-01-29 PROCEDURE — 97530 THERAPEUTIC ACTIVITIES: CPT

## 2025-01-29 PROCEDURE — 11000001 HC ACUTE MED/SURG PRIVATE ROOM

## 2025-01-29 PROCEDURE — 63600175 PHARM REV CODE 636 W HCPCS

## 2025-01-29 PROCEDURE — 80053 COMPREHEN METABOLIC PANEL: CPT

## 2025-01-29 PROCEDURE — 63600175 PHARM REV CODE 636 W HCPCS: Performed by: INTERNAL MEDICINE

## 2025-01-29 PROCEDURE — 84100 ASSAY OF PHOSPHORUS: CPT

## 2025-01-29 RX ORDER — QUETIAPINE FUMARATE 100 MG/1
100 TABLET, FILM COATED ORAL NIGHTLY
Status: DISCONTINUED | OUTPATIENT
Start: 2025-01-29 | End: 2025-01-30 | Stop reason: HOSPADM

## 2025-01-29 RX ORDER — ACETAMINOPHEN 325 MG/1
650 TABLET ORAL EVERY 4 HOURS PRN
Status: DISCONTINUED | OUTPATIENT
Start: 2025-01-29 | End: 2025-01-30 | Stop reason: HOSPADM

## 2025-01-29 RX ORDER — SODIUM,POTASSIUM PHOSPHATES 280-250MG
2 POWDER IN PACKET (EA) ORAL
Status: DISCONTINUED | OUTPATIENT
Start: 2025-01-29 | End: 2025-01-30 | Stop reason: HOSPADM

## 2025-01-29 RX ADMIN — QUETIAPINE FUMARATE 100 MG: 100 TABLET ORAL at 09:01

## 2025-01-29 RX ADMIN — DEXTROSE MONOHYDRATE 24 MILLION UNITS: 25000 INJECTION, SOLUTION INTRAVENOUS at 06:01

## 2025-01-29 RX ADMIN — LEVETIRACETAM 500 MG: 100 INJECTION INTRAVENOUS at 08:01

## 2025-01-29 RX ADMIN — ENOXAPARIN SODIUM 40 MG: 40 INJECTION SUBCUTANEOUS at 05:01

## 2025-01-29 RX ADMIN — FOLIC ACID-PYRIDOXINE-CYANOCOBALAMIN TAB 2.5-25-2 MG 1 TABLET: 2.5-25-2 TAB at 08:01

## 2025-01-29 RX ADMIN — HYDROCORTISONE 25 MG: 5 TABLET ORAL at 09:01

## 2025-01-29 RX ADMIN — LEVETIRACETAM 500 MG: 100 INJECTION INTRAVENOUS at 09:01

## 2025-01-29 RX ADMIN — HYDROCORTISONE 25 MG: 5 TABLET ORAL at 08:01

## 2025-01-29 RX ADMIN — CHLORHEXIDINE GLUCONATE 0.12% ORAL RINSE 15 ML: 1.2 LIQUID ORAL at 08:01

## 2025-01-29 RX ADMIN — POTASSIUM CHLORIDE 30 MEQ: 750 CAPSULE, EXTENDED RELEASE ORAL at 08:01

## 2025-01-29 RX ADMIN — Medication 400 MG: at 09:01

## 2025-01-29 RX ADMIN — Medication 400 MG: at 08:01

## 2025-01-29 RX ADMIN — Medication 100 MG: at 09:01

## 2025-01-29 RX ADMIN — BENZTROPINE MESYLATE 1 MG: 1 TABLET ORAL at 08:01

## 2025-01-29 RX ADMIN — HYDRALAZINE HYDROCHLORIDE 25 MG: 25 TABLET ORAL at 12:01

## 2025-01-29 RX ADMIN — ACETAMINOPHEN 650 MG: 325 TABLET ORAL at 09:01

## 2025-01-29 RX ADMIN — ACETAMINOPHEN 650 MG: 325 TABLET ORAL at 03:01

## 2025-01-29 RX ADMIN — POTASSIUM & SODIUM PHOSPHATES POWDER PACK 280-160-250 MG 2 PACKET: 280-160-250 PACK at 05:01

## 2025-01-29 NOTE — PROGRESS NOTES
"CONSULTATION LIAISON PSYCHIATRY PROGRESS NOTE    Patient Name: Tresa Henry  MRN: [id number]  Patient Class: IP- Inpatient  Admission Date: 1/20/2025  Attending Physician: Krista León MD      SUBJECTIVE:   Tresa Henry is a 64 y.o. female with past psychiatric history of schizophrenia spectrum vs bipolar disorder w psychotic features, methamphetamine use and cannabis use & past pertinent medical history of HLD, hep C, colon cancer s/p resection presents to the ED/admitted to the hospital for AMS.     Patient presented to OSH on 1/5 after EMS was contacted by unknown caller expressing concern for patient. On arrival, EMS found several people at patient's house intoxication. Patient noted to be confused on arrival and was taken to ED for evaluation. UDS + THC. She was found to be bacteremic and influenza positive with an HUSEYIN and electrolyte derangements. Patient's mentation improved but she subsequently became more agitated and was found to have pneumothorax. She was intubated for treatment and extubated on 1/14. She was transferred to [facility] for further management of acute encephalopathy.     Psychiatry consulted for "history of schizophrenia, but with dramatic waxing and waning mental status. Can we hold her psych meds to rule them out as cause? She is much less responsive and she has catatonia?"     Interval History: NAEON. No PRNs administered.    Today upon entering the room patient states that she has been waiting to for me and asked how "Esme" was doing. Asked how she was doing today patient paused and appeared saddened. She states that they want to put in a feeding tube. She says that she is anorexic and that she doesn't have an appetite and also doesn't want to eat. She does report recent depression that she has been struggling with for awhile. She is oriented to self and place (hospital, but not specific hospital) but not time. She continues to have some disorganized thinking and impaired " attention and memory.      OBJECTIVE:    Mental Status Exam:  General Appearance: poorly groomed, disheveled  Behavior: cooperative  Involuntary Movements and Motor Activity: no abnormal involuntary movements noted; no tics, no tremors, no akathisia, no dystonia, no evidence of tardive dyskinesia; no psychomotor agitation or retardation  Gait and Station: unable to assess - patient lying down or seated  Speech and Language: normal rate, normal rhythm, normal volume  Mood: Sad  Affect: constricted  Thought Process and Associations: disorganized  Perceptual Disturbances: hallucinations:  auditory and visual  Thought Content and Perceptions:: no suicidal ideation, + auditory hallucinations, + visual hallucinations  Sensorium and Orientation: delirious, waxing and waning  Recent and Remote Memory: impaired  Attention and Concentration:  improved attentiveness to conversation, spells house forward, incorrectly spelled backward  Fund of Knowledge: impaired  Insight: impaired  Judgment: limited    CAM ICU positive? yes      ASSESSMENT & RECOMMENDATIONS   Unspecified schizophrenia spectrum and other psychotic disorders  Unspecified delirium   H/O methamphetamine use disorder     Scheduled Medication(s):  Discontinue benztropine as anticholinergic medications can worsen delirium   Increase Seroquel to 100 mg nightly for treatment of delirium and h/o psychotic disorder     PRN Medication(s):  Seroquel 25 mg q6hr PRN for non-redirectable agitation.      Other Recommendations (labs, imaging, further consults, etc.):  Please provide patient assistance with meals       Delirium Behavior Management  PLEASE utilize PRN meds first for agitation. Minimize use of PHYSICAL restraints OR have periods of being out of physical restraints if possible.  Keep window shades open and room lit during day and room dim at night in order to promote normal sleep-wake cycles  Encourage family at bedside. [place] patient often to situation, location,  date.  Continue to Limit or Discontinue use of Narcotics, Benzos and Anti-cholinergic medications as they may worsen delirium.  Continue medical workup for causative etiology of Delirium.     Medical dx complicating psych dx  Syphilis  - RPR 1:1   - Started on empiric neurosyphilis treatment with penicillin  - recommend Neurology consult and LP to r/o active infx and possible Neurosyphilis      Sepsis, resolved              - MSSA bacteremia s/p Abx, subsequent Bcx clear  - UA without LE, nitrates, pyuria      Adrenal insufficiency   - 2/2 sepsis   - on steroid taper      Risk Assessment / Legal Status  Patient does not meet criteria for PEC or involuntary inpatient psychiatric admission at this time. Recommend to rescind PEC if one was placed. Patient is not currently an imminent danger to self or others and is not gravely disabled due to a psychiatric illness.    Follow-up:  Will follow-up while in house.    Disposition:   Defer to primary team.    Please contact ON CALL psychiatry service (24/7) for any acute issues that may arise.    Jer Teresa MD, PhD  [facility], PGY-2   [facility]  1/29/2025 11:29 AM        --------------------------------------------------------------------------------------------------------------------------------------------------------------------------------------------------------------------------------------    CONTINUED OBJECTIVE clinical data & findings reviewed and noted for above decision making    Current Medications:   Scheduled Meds:    benztropine  1 mg Per NG tube BID    chlorhexidine  15 mL Mouth/Throat BID    enoxparin  40 mg Subcutaneous Q24H (prophylaxis, 1700)    folic acid-vit B6-vit B12 2.5-25-2 mg  1 tablet Oral Daily    hydrocortisone  25 mg Oral BID    Followed by    [START ON 1/30/2025] hydrocortisone  10 mg Oral BID    levETIRAcetam (Keppra) IV (PEDS and ADULTS)  500 mg Intravenous BID    magnesium oxide  400 mg Oral BID     penicillin G potassium 24 Million Units in D5W 500 mL CONTINUOUS INFUSION  24 Million Units Intravenous Q24H    potassium chloride  30 mEq Oral with breakfast    QUEtiapine  50 mg Oral QHS    thiamine  100 mg Oral Daily     PRN Meds:   Current Facility-Administered Medications:     0.9%  NaCl infusion (for blood administration), , Intravenous, Q24H PRN    acetaminophen, 650 mg, Per NG tube, Q4H PRN    hydrALAZINE, 25 mg, Oral, Q4H PRN    naloxone, 0.02 mg, Intravenous, PRN    QUEtiapine, 25 mg, Oral, Q6H PRN    sodium chloride 0.9%, 10 mL, Intravenous, Q12H PRN    Allergies:   Review of patient's allergies indicates:   Allergen Reactions    Haldol [haloperidol lactate] Hallucinations    Divalproex Rash    Risperdal [risperidone] Palpitations       Vitals  Vitals:    01/29/25 0806   BP: (!) 149/91   Pulse: 88   Resp: 18   Temp: 98 °F (36.7 °C)       Labs/Imaging/Studies:  Recent Results (from the past 24 hours)   Phosphorus    Collection Time: 01/29/25  8:48 AM   Result Value Ref Range    Phosphorus 2.6 (L) 2.7 - 4.5 mg/dL   Magnesium    Collection Time: 01/29/25  8:48 AM   Result Value Ref Range    Magnesium 1.8 1.6 - 2.6 mg/dL   Comprehensive Metabolic Panel    Collection Time: 01/29/25  8:48 AM   Result Value Ref Range    Sodium 139 136 - 145 mmol/L    Potassium 4.8 3.5 - 5.1 mmol/L    Chloride 109 95 - 110 mmol/L    CO2 22 (L) 23 - 29 mmol/L    Glucose 103 70 - 110 mg/dL    BUN 28 (H) 8 - 23 mg/dL    Creatinine 0.7 0.5 - 1.4 mg/dL    Calcium 8.0 (L) 8.7 - 10.5 mg/dL    Total Protein 6.5 6.0 - 8.4 g/dL    Albumin 2.5 (L) 3.5 - 5.2 g/dL    Total Bilirubin 0.8 0.1 - 1.0 mg/dL    Alkaline Phosphatase 62 40 - 150 U/L    AST 41 (H) 10 - 40 U/L    ALT 13 10 - 44 U/L    eGFR >60.0 >60 mL/min/1.73 m^2    Anion Gap 8 8 - 16 mmol/L

## 2025-01-29 NOTE — PLAN OF CARE
Disoriented to time & date. Ongoing IV Abx. HTN, DBP >100; hydralazine 25mg PO given. Tylenol for leg pain. Poor PO intake, calorie count ongoing till 1/3. Reposition for comfort.      Problem: Adult Inpatient Plan of Care  Goal: Plan of Care Review  Outcome: Progressing  Goal: Patient-Specific Goal (Individualized)  Outcome: Progressing  Goal: Absence of Hospital-Acquired Illness or Injury  Outcome: Progressing  Goal: Optimal Comfort and Wellbeing  Outcome: Progressing  Goal: Readiness for Transition of Care  Outcome: Progressing     Problem: Sepsis/Septic Shock  Goal: Optimal Coping  Outcome: Progressing  Goal: Absence of Bleeding  Outcome: Progressing  Goal: Blood Glucose Level Within Targeted Range  Outcome: Progressing  Goal: Absence of Infection Signs and Symptoms  Outcome: Progressing  Goal: Optimal Nutrition Intake  Outcome: Progressing     Problem: Acute Kidney Injury/Impairment  Goal: Fluid and Electrolyte Balance  Outcome: Progressing  Goal: Improved Oral Intake  Outcome: Progressing  Goal: Effective Renal Function  Outcome: Progressing     Problem: Pneumonia  Goal: Fluid Balance  Outcome: Progressing  Goal: Resolution of Infection Signs and Symptoms  Outcome: Progressing  Goal: Effective Oxygenation and Ventilation  Outcome: Progressing     Problem: ARDS (Acute Respiratory Distress Syndrome)  Goal: Effective Oxygenation  Outcome: Progressing     Problem: Wound  Goal: Optimal Coping  Outcome: Progressing  Goal: Optimal Functional Ability  Outcome: Progressing  Goal: Absence of Infection Signs and Symptoms  Outcome: Progressing  Goal: Improved Oral Intake  Outcome: Progressing  Goal: Optimal Pain Control and Function  Outcome: Progressing  Goal: Skin Health and Integrity  Outcome: Progressing  Goal: Optimal Wound Healing  Outcome: Progressing     Problem: Skin Injury Risk Increased  Goal: Skin Health and Integrity  Outcome: Progressing     Problem: Fall Injury Risk  Goal: Absence of Fall and Fall-Related  Injury  Outcome: Progressing     Problem: Infection  Goal: Absence of Infection Signs and Symptoms  Outcome: Progressing

## 2025-01-29 NOTE — PT/OT/SLP PROGRESS
Occupational Therapy   Co Treatment    Name: Tresa Henry  MRN: [id number]  Admitting Diagnosis:  Encephalopathy, metabolic       Recommendations:     Discharge Recommendations: Moderate Intensity Therapy  Discharge Equipment Recommendations:  lift device, hospital bed, wheelchair  Barriers to discharge:  Other (Comment) (increased skilled A needed)    Assessment:     Tresa Henry is a 64 y.o. female with a medical diagnosis of Encephalopathy, metabolic.  She presents with improved activity tolerance this date with increased participation in OT session. Performance deficits affecting function are weakness, gait instability, decreased upper extremity function, impaired endurance, impaired balance, impaired coordination, decreased lower extremity function, decreased safety awareness, impaired fine motor, impaired self care skills, impaired cognition, pain, impaired skin, impaired functional mobility, decreased coordination, abnormal tone, edema. Pt oriented to self and place, following 50% of verbal commands with increased assistance needed. Pt continues to display waxing and waning cognitive status, with tangential verbalizations though participating in conversation. Pt continues to require significant assistance, though able to sit EOB with decreased assistance for extended duration prior to fatiguing. Pt continues to display generalized weakness with BLE weakness > BUE weakness. Patient continues to demonstrate the need for moderate intensity therapy on a daily basis post acute exhibited by decreased independence with self-care and functional mobility    Co-Treatment conducted due to patient medical instability and to promote patient safety.     Rehab Prognosis:  Fair; patient would benefit from acute skilled OT services to address these deficits and reach maximum level of function.       Plan:     Patient to be seen 4 x/week to address the above listed problems via self-care/home management, therapeutic  "activities, therapeutic exercises, neuromuscular re-education  Plan of Care Expires: 02/24/25  Plan of Care Reviewed with: patient    Subjective     Chief Complaint: "I try to stay in the era of my birth year" "I'm 64" "my sisters  gets mad when I dont eat" (nursing notified) "I fall a lot, especially if theres grout"  Patient/Family Comments/goals: increase independence  Pain/Comfort:  Pain Rating 1: 10/10  Location - Orientation 1: generalized  Location 1: sacral spine  Pain Addressed 1: Reposition, Distraction, Cessation of Activity, Nurse notified  Pain Rating Post-Intervention 1: 10/10    Objective:     Communicated with: nursing prior to session.  Patient found HOB elevated with telemetry, bed alarm, PureWick, pressure relief boots upon OT entry to room.    General Precautions: Standard, fall, aspiration    Orthopedic Precautions:N/A  Braces: N/A  Respiratory Status: Room air     Occupational Performance:     Bed Mobility:    Patient completed Rolling/Turning to Left with  maximal assistance  Patient completed Rolling/Turning to Right with maximal assistance  Patient completed Scooting/Bridging with maximal assistance and 2 persons  Patient completed Supine to Sit with maximal assistance and 2 persons  Patient completed Sit to Supine with maximal assistance and 2 persons   Pt seated EOB for initial 20 minutes with SBA with BUE support with instances of minimal assistance needed, upon last 5 minutes of session pt requiring moderate to maximal assistance with multidirectional leans    Functional Mobility/Transfers:  Deferred 2/2 pt with BM incontinence    Activities of Daily Living:  Grooming: pt completing oral hygiene while seated EOB with set up assistance; pt completing face washing with set up assistance    Lower Body Dressing: maximal assistance to daljit footwear, pt encouraged to attempt task though pt reporting inability to complete despite encouragement  Toileting: total assistance for pericare " and clothing management  at bed level 2/2 BM incontinence     Therapeutic exercise: pt completing 5 anterior shoulder flexion reaches     AMPAC 6 Click ADL: 9    Treatment & Education:  Session this date targeted self-care and therapeutic activities to increase pt's independence   Pt educated on OT roles, POC, call button for assistance, importance of OOB activity     Patient left HOB elevated with all lines intact, call button in reach, bed alarm on, and nursing notified    GOALS:   Multidisciplinary Problems       Occupational Therapy Goals          Problem: Occupational Therapy    Goal Priority Disciplines Outcome Interventions   Occupational Therapy Goal     OT, PT/OT Progressing    Description: Goals to be met by: 2/24/25     Patient will increase functional independence with ADLs by performing:    UE Dressing with Moderate Assistance.  LE Dressing with Moderate Assistance.  Grooming while EOB with Moderate Assistance.  Toileting from bedside commode with Moderate Assistance for hygiene and clothing management.   Sitting at edge of bed x15 minutes with Moderate Assistance.  Supine to sit with Moderate Assistance.  Stand pivot transfers with Maximum Assistance.  Step transfer with Total Assistance  Toilet transfer to bedside commode with Maximum Assistance.                         DME Justifications:  Tresa requires a hospital bed due to her requiring positioning of the body in ways not feasible with an ordinary bed due to limited ability and cannot independently make changes in body position without the use of the bed.The positioning of the body cannot be sufficiently resolved by the use of pillows and wedges.  Tresa Henry has a mobility limitation that significantly impairs her ability to participate in one or more mobility related activities of daily living (MRADLs) such as toileting, feeding, dressing, grooming, and bathing in customary locations in the home.  The mobility limitation cannot be sufficiently  resolved by the use of a cane or walker.   The use of a manual wheelchair will significantly improve the patients ability to participate in MRADLS and the patient will use it on regular basis in the home.  Tresa Henry has expressed her willingness to use a manual wheelchair in the home. Patients upper body strength is sufficient for propulsion.  He/She also has a caregiver who is available, willing, and able to provide assistance with the wheelchair when needed.      Time Tracking:     OT Date of Treatment: 01/29/25  OT Start Time: 0930  OT Stop Time: 1018  OT Total Time (min): 48 min    Billable Minutes:Self Care/Home Management 33  Therapeutic Activity 15    OT/BERNARD: OT          1/29/2025

## 2025-01-29 NOTE — HPI
Tresa Henry is 64 year old female with Mhx of substance use, psychosis, bipolar disorder, HCV, colon cancer, and hyperlipidemia presented to [facility] ICU on 01/05 with AMS. EMS was called to scene where pt was found confused, tachycardic and in pain. Concern for potential drug intoxication and/or withdrawal per EMS. Required admission to ICU for the AMS, Mutlifocal PNA and HUSEYIN. Found to have MSSA bacteremia and positive for influenza A; treated with abx and Tamiflu, respectively. Brief step down from the ICU on 01/09 with improved mentation. Hospital course complicated by large R sided pneumothorax s/p chest tube placement, thrombocytopenia, neutropenia, and hyponatremia. Stepped back to ICU on 01/11 for subsequently requiring intubation, central line, and arterial line for concerns of ARDS; extubated on 01/14. Subsequent Bcx NGTD and NGT placed for nutrition. She required BiPAP intermittently for work breathing for a period of time, but has been on stable nasal cannula recently. Of recent, pt unable to her R foot, followed by inability to arms. Despite prolonged admission and care, patient continued to experience AMS.     With persistent encephalopathy of uncertain etiology, referring team is requested transfer to [facility] for continued treatment of acute encephalopathy. Skin integrity JENIFFER consulted for evaluation of skin injury.

## 2025-01-29 NOTE — PLAN OF CARE
CM in communication with patient daughter Nelly to discuss discharge planning.  Patient is anticipated to be medically ready on Wednesday 2/5/2025.  Multiple referrals with denials for Skilled Nursing. CM will follow up with 2 facilities in [place] that have not responded.  CM advised daughter that [facility] in [place] has accepted and that if when the patient is ready, if we do not have any additional acceptance we will work with [facility], daughter verbalized understanding.

## 2025-01-29 NOTE — ASSESSMENT & PLAN NOTE
Patient's most recent phosphorus results are listed below.   Recent Labs     01/27/25  0323 01/28/25  0433 01/29/25  0848   PHOS 3.2 3.5 2.6*     Replace orally

## 2025-01-29 NOTE — ASSESSMENT & PLAN NOTE
- consult received for evaluation of skin injury.  - [place] [facility] [place] of acute encephalopathy.   - healing full thickness tissue loss to sacrum. Pink, moist wound base. No longer with slough to wound base.  - wound present on admission.  - continue orders per wound care RN recs.  - torrey surface with waffle overlay.  - wedge for offloading.  - external urinary catheter.  - nursing to maintain pressure injury prevention measures and continue wound care per orders.

## 2025-01-29 NOTE — SUBJECTIVE & OBJECTIVE
Scheduled Meds:   benztropine  1 mg Per NG tube BID    chlorhexidine  15 mL Mouth/Throat BID    enoxparin  40 mg Subcutaneous Q24H (prophylaxis, 1700)    folic acid-vit B6-vit B12 2.5-25-2 mg  1 tablet Oral Daily    hydrocortisone  25 mg Oral BID    Followed by    [START ON 1/30/2025] hydrocortisone  10 mg Oral BID    levETIRAcetam (Keppra) IV (PEDS and ADULTS)  500 mg Intravenous BID    magnesium oxide  400 mg Oral BID    penicillin G potassium 24 Million Units in D5W 500 mL CONTINUOUS INFUSION  24 Million Units Intravenous Q24H    potassium chloride  30 mEq Oral with breakfast    QUEtiapine  50 mg Oral QHS    thiamine  100 mg Oral Daily     Continuous Infusions:  PRN Meds:  Current Facility-Administered Medications:     0.9%  NaCl infusion (for blood administration), , Intravenous, Q24H PRN    acetaminophen, 650 mg, Per NG tube, Q4H PRN    hydrALAZINE, 25 mg, Oral, Q4H PRN    naloxone, 0.02 mg, Intravenous, PRN    QUEtiapine, 25 mg, Oral, Q6H PRN    sodium chloride 0.9%, 10 mL, Intravenous, Q12H PRN    Review of patient's allergies indicates:   Allergen Reactions    Haldol [haloperidol lactate] Hallucinations    Divalproex Rash    Risperdal [risperidone] Palpitations        Past Medical History:   Diagnosis Date    Addiction to drug 1984    [facility]    Anxiety 11/1984    [facility].     Bipolar 1 disorder     Dementia with behavioral disturbance 1/27/2025    Depression 1984    [facility].     Encounter for blood transfusion     Hallucination 2018    [facility] Hallucinations.     History of psychiatric hospitalization 1984    Patient stated she has been hosptialiized 20 times.     History of psychiatric hospitalization 2018    3/7/18 [facility]    History of psychiatric hospitalization 2019    2/20/19; 4/9/19; 7/31/19 9/23/19 [facility]    Hx of psychiatric care 2015    Tegretal, Topamax, Thorazin, cogentin, Lithium     Liver disease     Psychiatric problem  1990    Anxiety (generalized anxiety disorder.)    Psychoses 1/27/2025    Schizophrenia 1984    [facility] in [place]    Seizures     Substance abuse     Suicide attempt 11/01/1984    Therapy 2000    [facility]    Weakness 03/09/2021    Withdrawal symptoms, drug or narcotic      Past Surgical History:   Procedure Laterality Date    COLON RESECTION      COLONOSCOPY N/A 11/14/2022    Procedure: COLONOSCOPY;  Surgeon: Maggie Arriaza MD;  Location: [facility];  Service: Endoscopy;  Laterality: N/A;    COLONOSCOPY N/A 03/05/2024    Procedure: COLONOSCOPY;  Surgeon: Amadeo Crawford MD;  Location: [facility] END[facility];  Service: General;  Laterality: N/A;    ESOPHAGOGASTRODUODENOSCOPY N/A 11/14/2022    Procedure: EGD (ESOPHAGOGASTRODUODENOSCOPY);  Surgeon: Maggie Arriaza MD;  Location: [facility];  Service: Endoscopy;  Laterality: N/A;    HYSTERECTOMY         Family History       Problem Relation (Age of Onset)    Liver cancer Brother    No Known Problems Father, Sister, Maternal Aunt    Schizophrenia Mother          Tobacco Use    Smoking status: Every Day     Current packs/day: 0.50     Average packs/day: 0.5 packs/day for 40.1 years (20.0 ttl pk-yrs)     Types: Cigarettes     Start date: 1975     Last attempt to quit: 12/21/2012     Passive exposure: Never    Smokeless tobacco: Never    Tobacco comments:     Pt declined smoking cessation services at this time 12/21/2023, updated smoking status   Substance and Sexual Activity    Alcohol use: No    Drug use: Not Currently     Types: Marijuana, Amphetamines    Sexual activity: Not Currently     Partners: Male     Review of Systems   Skin:  Positive for wound.       Objective:     Vital Signs (Most Recent):  Temp: 98 °F (36.7 °C) (01/29/25 0806)  Pulse: 88 (01/29/25 0806)  Resp: 18 (01/29/25 0806)  BP: (!) 149/91 (01/29/25 0806)  SpO2: 99 % (01/29/25 0806) Vital Signs (24h Range):  Temp:  [97.5 °F (36.4 °C)-99.1 °F (37.3 °C)] 98 °F (36.7 °C)  Pulse:   [] 88  Resp:  [18-24] 18  SpO2:  [98 %-100 %] 99 %  BP: (132-175)/() 149/91     Weight: 64 kg (141 lb 1.5 oz)  Body mass index is 22.1 kg/m².     Physical Exam  Constitutional:       Appearance: Normal appearance.   Skin:     General: Skin is warm and dry.      Findings: Lesion present.   Neurological:      Mental Status: She is alert.          Laboratory:  All pertinent labs reviewed within the last 24 hours.    Diagnostic Results:  None

## 2025-01-29 NOTE — PROGRESS NOTES
Hospital Medicine  Progress note    Team: [facility] HOSP MED R Krista León MD  Admit Date: 1/20/2025  Code status: Full Code    Principal Problem:  Encephalopathy, metabolic    Interval hx: Patient even more alert today. Able to discuss need for PEG tube. She is agreeable. States she long has issues with appetite and poor oral intake for some time.     PEx  Temp:  [97.5 °F (36.4 °C)-99.2 °F (37.3 °C)]   Pulse:  []   Resp:  [18-24]   BP: (132-175)/()   SpO2:  [97 %-100 %]     Intake/Output Summary (Last 24 hours) at 1/29/2025 1624  Last data filed at 1/29/2025 0500  Gross per 24 hour   Intake 74.21 ml   Output 600 ml   Net -525.79 ml         General Appearance: no acute distress, WD, chronically ill-appeaing  Heart: regular rate and rhythm, no heave  Respiratory: Normal respiratory effort, symmetric excursion, bilateral vesicular breath sounds   Abdomen: Soft, non-tender; bowel sounds active  Skin: intact, no rash, no ulcers  Neurologic:  No focal numbness or weakness  Mental status: Alert, oriented to name and place, affect appropriate    Recent Labs   Lab 01/26/25 0434 01/27/25 0323 01/28/25  0433   WBC 2.56* 4.44 1.85*   HGB 8.4* 10.7* 8.9*   HCT 25.9* 32.2* 27.3*   PLT 89* 137* 102*     Recent Labs   Lab 01/27/25 0323 01/28/25 0433 01/29/25  0848    144 139   K 2.8* 3.1* 4.8    112* 109   CO2 23 24 22*   BUN 36* 29* 28*   CREATININE 0.8 0.8 0.7   GLU 94 94 103   CALCIUM 8.0* 7.5* 8.0*   MG 1.7 1.7 1.8   PHOS 3.2 3.5 2.6*     Recent Labs   Lab 01/27/25 0323 01/28/25  0433 01/29/25  0848   ALKPHOS 65 54 62   ALT 13 12 13   AST 60* 46* 41*   ALBUMIN 2.5* 2.2* 2.5*   PROT 6.4 5.5* 6.5   BILITOT 0.8 0.6 0.8        Scheduled Meds:   chlorhexidine  15 mL Mouth/Throat BID    enoxparin  40 mg Subcutaneous Q24H (prophylaxis, 1700)    folic acid-vit B6-vit B12 2.5-25-2 mg  1 tablet Oral Daily    hydrocortisone  25 mg Oral BID    Followed by    [START ON 1/30/2025] hydrocortisone  10 mg Oral BID     levETIRAcetam (Keppra) IV (PEDS and ADULTS)  500 mg Intravenous BID    magnesium oxide  400 mg Oral BID    penicillin G potassium 24 Million Units in D5W 500 mL CONTINUOUS INFUSION  24 Million Units Intravenous Q24H    potassium chloride  30 mEq Oral with breakfast    QUEtiapine  100 mg Oral QHS    thiamine  100 mg Oral Daily     Continuous Infusions:  As Needed:    Current Facility-Administered Medications:     0.9%  NaCl infusion (for blood administration), , Intravenous, Q24H PRN    acetaminophen, 650 mg, Per NG tube, Q4H PRN    hydrALAZINE, 25 mg, Oral, Q4H PRN    naloxone, 0.02 mg, Intravenous, PRN    QUEtiapine, 25 mg, Oral, Q6H PRN    sodium chloride 0.9%, 10 mL, Intravenous, Q12H PRN    Assessment and Plan  / Problems managed today    * Encephalopathy, metabolic  Found by EMS with high suspicion for substance use. UDS THC presumed positive. CT head without acute intracranial pathology. HIV hegative, RPR positive. Hx of untreated Hep C. Collateral history provided by sister in the chart reports pt with history of worsening dementia however current mentation is not baseline. Prior to admission, sister reports that patient experience cough and fevers. Likely multifactorial: metabolic secondary to electrolyte disturbances, sepsis and/or substance abuse. Likely compounded by delirium given prolonged critical admission. Neurology consulted, agree with multifactorial etiology. Will f/u in clinic at OR. Mentation improving, though still oriented x1 only.     - neurochecks  - aspiration, delirium and fall precautions  - Continued correction of metabolic parameters, including azotemia, hyperammonia, hypocalcemia and hypernatremia (now resolved)  - ID consulted for possible syphilis tx   - Delirium precautions  - empiric high dose IV thiamine for 5 days, followed by 100mg po    1/27 Dramatic changed in [place]. CT head negative for acute changed  Maybe non-epileptic status? EEG  Psych medication related or psych related ?  Psychiatry consulted  Reported history of dementia - is the rapidly progressing...CJD or lewey body dementia? Neurology consulted  Will start treatment for neurosyphillis given persistently positive syphilis testing    Improved mentation the following day    Likely with delirium on dementia  Rule other causes as above as patient with rapid decline and now total care    Hypophosphatemia  Patient's most recent phosphorus results are listed below.   Recent Labs     01/27/25  0323 01/28/25  0433 01/29/25  0848   PHOS 3.2 3.5 2.6*     Replace orally    Elevated blood pressure reading  Not sure if from IV hydrocortisone  If BPs consistently still elevated, when start antihypertensive medicine    Homelessness  Per daughter, patient has no home.    Schizophrenia  Psychiatry saw patient - zyprexa and cogentin stopped  Quetiapine 50 mg qhs    Severe malnutrition  Minimal intake since admission  Oral intake not improved as her alertness improved  Nursing to assist with all feedings  She may need PEG at this time - calorie count    Quadriparesis  Requires mod to max assist for all mobility, transfers and ADLs    Hypokalemia  Patient's most recent potassium results are listed below.   Recent Labs     01/27/25  0323 01/28/25  0433 01/29/25  0848   K 2.8* 3.1* 4.8     Plan  - Replete potassium per protocol  - Monitor potassium Daily  - Patient's hypokalemia is worsening. Will adjust treatment as follows: IV replacement through fluids  IV and oral replacement    Dementia with psychotic disturbance  Per daughter patient has had work up for memory loss a year ago. Progressively worsened after she lost her home in November.     Adrenal insufficiency  Relative adrenal insufficiency  Hydrocortisone started during sepsis three weeks ago  Wean off over a weak    Will not treat associated elevated blood pressures    Syphilis  9/2023 FTA Abs weekly positive; 8/18/2024 FTA reactive and weakly reactive per office of public health - she was  not treated as felt to be false positives  RPR 1:1 here  ID saw patient. Recommended treatments based on our suspicion of syphilis or neurosyphilis  Given terrible mental status and little improvement since admission will treat for neurosyphilis - continuous penicillin G 24 million units daily until enteral intake is stable and sustained (then can switch to doxycyline 100 mg  BID) for 21 days total.    Paralysis of right common peroneal nerve  -- will f/u in neuro clinic at [place]       Tobacco dependency  Dangers of cigarette smoking were reviewed with patient in detail. Patient was Counseled for 3-10 minutes. Nicotine replacement options were discussed. Nicotine replacement was discussed- not prescribed per patient's request    Delirium  Patient with acute delirium. There is no specific treatment. Will avoid narcotics/benzos that are known to worsen condition and add PRN antipsychotics to limit behaviors of self harm. Monitor closely.  Fall and delirium precautions.     Hypernatremia  Hypernatremia is likely due to Dehydration. The patient's most recent sodium results are listed below.  Recent Labs     01/27/25  0323 01/28/25  0433 01/29/25  0848    144 139     Plan  - Aim to correct the sodium by 8-10mEq in 24 hours.   - Will plan to trend the patient's sodium: Daily  - The patient's hypernatremia is stable  - encourage PO now that patient is more awake. Can give additional IVF if continued hypernatremia despite PO intake   Due to poor intake. May need to consider PEG is she keeps requiring IV hydration    Secondary spontaneous pneumothorax  S/p chest tube placement and adequate re-expansion on repeat CXR. Etiology of pneumothorax potentially from substance inhalation causing lung damage overtime. CT c/a/p 10/2024 with evidence of aerated lungs; however severe lung damage with evidence of ephysema and opacification noted on most recent CT.     MSSA bacteremia  Bxc 01/05 MSSA. Was on Vanc and Zosyn; however  transitioned to Vanc and Meropenem due to thrombocytopenia per ID.      - on ancef at last hospital, cont for now   - f/u echo negative for vegetation  - ID consulted, appreciate recs . Completed treatment    Influenza A with pneumonia  RIP 01/05 positive for influenza A. Completed Tamiflu.     Drug dependence  C/f polysubstance abuse. UDS 01/05 presumed positive for THC. Completed Klonopin taper per Psych at OSH for potential benzo withdrawal. Not currently active use. Patient does not have mental or physical capacity to seek, find and take drugs.     Sepsis with acute organ dysfunction  Strep pneumo urine antigen positive   MSSA bacteremia; follow-up Bcx cleared  Influenza A positive  UA negative for infectious etiology     Placed on Vanc and Zosyn --> Cefazolin and Zosyn --> Vanc and Rm (changed per ID due to thrombocytopenia and spiking fevers on 01/11). Completed Tamiflu. ID deferred treatment of the maame dubliniensis respiratory culture.     - TTE negative for endocarditis  - completed two week course of cefazolin    Colon cancer  -h/o noted       Diet:  dental soft  GI PPx: not needed  DVT PPx:  enoxaparin  Airways: room air  Wounds: none    Goals of Care:  Return to prior functional status     Discharge Planning   NICK: 2/5/2025   Is the patient medically ready for discharge?:     Reason for patient still in hospital (select all that apply): Patient trending condition and Treatment  Discharge Plan A: Rehab   Discharge Delays: None known at this time    Krista León MD

## 2025-01-29 NOTE — CONSULTS
[facility] - Neurosurgery [facility])  Skin Integrity JENIFFER  Consult Note    Patient Name: Tresa Henry  MRN: [id number]  Admission Date: 1/20/2025  Hospital Length of Stay: 9 days  Attending Physician: Krista León MD  Primary Care Provider: Bina Holguin NP     Inpatient consult to Skin Integrity  Practitioner  Consult performed by: Neva Phelan NP  Consult ordered by: Neva Phelan NP        Subjective:     History of Present Illness:  Tresa Henry is 64 year old female with Mhx of substance use, psychosis, bipolar disorder, HCV, colon cancer, and hyperlipidemia presented to [facility] ICU on 01/05 with AMS. EMS was called to scene where pt was found confused, tachycardic and in pain. Concern for potential drug intoxication and/or withdrawal per EMS. Required admission to ICU for the AMS, Mutlifocal PNA and HUSEYIN. Found to have MSSA bacteremia and positive for influenza A; treated with abx and Tamiflu, respectively. Brief step down from the ICU on 01/09 with improved mentation. Hospital course complicated by large R sided pneumothorax s/p chest tube placement, thrombocytopenia, neutropenia, and hyponatremia. Stepped back to ICU on 01/11 for subsequently requiring intubation, central line, and arterial line for concerns of ARDS; extubated on 01/14. Subsequent Bcx NGTD and NGT placed for nutrition. She required BiPAP intermittently for work breathing for a period of time, but has been on stable nasal cannula recently. Of recent, pt unable to her R foot, followed by inability to arms. Despite prolonged admission and care, patient continued to experience AMS.     With persistent encephalopathy of uncertain etiology, referring team is requested transfer to [facility] for continued treatment of acute encephalopathy. Skin integrity JENIFFER consulted for evaluation of skin injury.       Scheduled Meds:   benztropine  1 mg Per NG tube BID    chlorhexidine  15 mL Mouth/Throat BID    enoxparin  40 mg  Subcutaneous Q24H (prophylaxis, 1700)    folic acid-vit B6-vit B12 2.5-25-2 mg  1 tablet Oral Daily    hydrocortisone  25 mg Oral BID    Followed by    [START ON 1/30/2025] hydrocortisone  10 mg Oral BID    levETIRAcetam (Keppra) IV (PEDS and ADULTS)  500 mg Intravenous BID    magnesium oxide  400 mg Oral BID    penicillin G potassium 24 Million Units in D5W 500 mL CONTINUOUS INFUSION  24 Million Units Intravenous Q24H    potassium chloride  30 mEq Oral with breakfast    QUEtiapine  50 mg Oral QHS    thiamine  100 mg Oral Daily     Continuous Infusions:  PRN Meds:  Current Facility-Administered Medications:     0.9%  NaCl infusion (for blood administration), , Intravenous, Q24H PRN    acetaminophen, 650 mg, Per NG tube, Q4H PRN    hydrALAZINE, 25 mg, Oral, Q4H PRN    naloxone, 0.02 mg, Intravenous, PRN    QUEtiapine, 25 mg, Oral, Q6H PRN    sodium chloride 0.9%, 10 mL, Intravenous, Q12H PRN    Review of patient's allergies indicates:   Allergen Reactions    Haldol [haloperidol lactate] Hallucinations    Divalproex Rash    Risperdal [risperidone] Palpitations        Past Medical History:   Diagnosis Date    Addiction to drug 1984    [facility]    Anxiety 11/1984    [facility].     Bipolar 1 disorder     Dementia with behavioral disturbance 1/27/2025    Depression 1984    [facility].     Encounter for blood transfusion     Hallucination 2018    Tactile Hallucinations.     History of psychiatric hospitalization 1984    Patient stated she has been hosptialiized 20 times.     History of psychiatric hospitalization 2018    3/7/18 [facility]    History of psychiatric hospitalization 2019 2/20/19; 4/9/19; 7/31/19 9/23/19 [facility]    Hx of psychiatric care 2015    Tegretal, Topamax, Thorazin, cogentin, Lithium     Liver disease     Psychiatric problem 1990    Anxiety (generalized anxiety disorder.)    Psychoses 1/27/2025    Schizophrenia 1984    [facility]  in [place]    Seizures     Substance abuse     Suicide attempt 11/01/1984    Therapy 2000    [facility]    Weakness 03/09/2021    Withdrawal symptoms, drug or narcotic      Past Surgical History:   Procedure Laterality Date    COLON RESECTION      COLONOSCOPY N/A 11/14/2022    Procedure: COLONOSCOPY;  Surgeon: Maggie Arriaza MD;  Location: [facility] ENDO;  Service: Endoscopy;  Laterality: N/A;    COLONOSCOPY N/A 03/05/2024    Procedure: COLONOSCOPY;  Surgeon: Amadeo Crawford MD;  Location: [facility] ENDO;  Service: General;  Laterality: N/A;    ESOPHAGOGASTRODUODENOSCOPY N/A 11/14/2022    Procedure: EGD (ESOPHAGOGASTRODUODENOSCOPY);  Surgeon: Maggie Arriaza MD;  Location: [facility] ENDO;  Service: Endoscopy;  Laterality: N/A;    HYSTERECTOMY         Family History       Problem Relation (Age of Onset)    Liver cancer Brother    No Known Problems Father, Sister, Maternal Aunt    Schizophrenia Mother          Tobacco Use    Smoking status: Every Day     Current packs/day: 0.50     Average packs/day: 0.5 packs/day for 40.1 years (20.0 ttl pk-yrs)     Types: Cigarettes     Start date: 1975     Last attempt to quit: 12/21/2012     Passive exposure: Never    Smokeless tobacco: Never    Tobacco comments:     Pt declined smoking cessation services at this time 12/21/2023, updated smoking status   Substance and Sexual Activity    Alcohol use: No    Drug use: Not Currently     Types: Marijuana, Amphetamines    Sexual activity: Not Currently     Partners: Male     Review of Systems   Skin:  Positive for wound.       Objective:     Vital Signs (Most Recent):  Temp: 98 °F (36.7 °C) (01/29/25 0806)  Pulse: 88 (01/29/25 0806)  Resp: 18 (01/29/25 0806)  BP: (!) 149/91 (01/29/25 0806)  SpO2: 99 % (01/29/25 0806) Vital Signs (24h Range):  Temp:  [97.5 °F (36.4 °C)-99.1 °F (37.3 °C)] 98 °F (36.7 °C)  Pulse:  [] 88  Resp:  [18-24] 18  SpO2:  [98 %-100 %] 99 %  BP: (132-175)/() 149/91     Weight: 64 kg (141 lb  1.5 oz)  Body mass index is 22.1 kg/m².     Physical Exam  Constitutional:       Appearance: Normal appearance.   Skin:     General: Skin is warm and dry.      Findings: Lesion present.   Neurological:      Mental Status: She is alert.          Laboratory:  All pertinent labs reviewed within the last 24 hours.    Diagnostic Results:  None      Assessment/Plan:              JENIFFER Skin Integrity Evaluation      Skin Integrity JENIFFER evaluation of patient as part of the comprehensive skin care team.     She has been admitted for 8 days. Skin injury was noted on 1/13/25. POA yes.    L heel      Sacrum          Orthopedic  Blood blister  - intact large boggy blood blister to left heel with purple/maroon discoloration.  - continue orders per wound care RN recs.  - heel foam intact. Heel boots for offloading.  - right heel clear.    Pressure injury of sacral region, stage 3  - consult received for evaluation of skin injury.  - pt transferred to [facility] for continued treatment of acute encephalopathy.   - healing full thickness tissue loss to sacrum. Pink, moist wound base. No longer with slough to wound base.  - wound present on admission.  - continue orders per wound care RN recs.  - torrey surface with waffle overlay.  - wedge for offloading.  - external urinary catheter.  - nursing to maintain pressure injury prevention measures and continue wound care per orders.        Thank you for your consult. I will follow-up with patient. Please contact us if you have any additional questions.      Neva Phelan NP  Skin Integrity JENIFFER  [facility] - Neurosurgery ([facility])
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
## 2025-01-29 NOTE — ASSESSMENT & PLAN NOTE
- intact large boggy blood blister to left heel with purple/maroon discoloration.  - continue orders per wound care RN recs.  - heel foam intact. Heel boots for offloading.  - right heel clear.

## 2025-01-30 VITALS
RESPIRATION RATE: 20 BRPM | DIASTOLIC BLOOD PRESSURE: 91 MMHG | HEIGHT: 67 IN | TEMPERATURE: 99 F | BODY MASS INDEX: 22.15 KG/M2 | HEART RATE: 102 BPM | SYSTOLIC BLOOD PRESSURE: 150 MMHG | OXYGEN SATURATION: 99 % | WEIGHT: 141.13 LBS

## 2025-01-30 PROBLEM — B95.61 MSSA BACTEREMIA: Status: RESOLVED | Noted: 2025-01-11 | Resolved: 2025-01-30

## 2025-01-30 PROBLEM — E87.6 HYPOKALEMIA: Status: RESOLVED | Noted: 2025-01-27 | Resolved: 2025-01-30

## 2025-01-30 PROBLEM — J09.X1 INFLUENZA A WITH PNEUMONIA: Status: RESOLVED | Noted: 2025-01-11 | Resolved: 2025-01-30

## 2025-01-30 PROBLEM — J96.01 ACUTE HYPOXEMIC RESPIRATORY FAILURE: Status: RESOLVED | Noted: 2025-01-06 | Resolved: 2025-01-30

## 2025-01-30 PROBLEM — E87.0 HYPERNATREMIA: Status: RESOLVED | Noted: 2025-01-15 | Resolved: 2025-01-30

## 2025-01-30 PROBLEM — R65.20 SEPSIS WITH ACUTE ORGAN DYSFUNCTION: Status: RESOLVED | Noted: 2025-01-05 | Resolved: 2025-01-30

## 2025-01-30 PROBLEM — R78.81 MSSA BACTEREMIA: Status: RESOLVED | Noted: 2025-01-11 | Resolved: 2025-01-30

## 2025-01-30 PROBLEM — E83.39 HYPOPHOSPHATEMIA: Status: RESOLVED | Noted: 2025-01-29 | Resolved: 2025-01-30

## 2025-01-30 PROBLEM — A41.9 SEPSIS WITH ACUTE ORGAN DYSFUNCTION: Status: RESOLVED | Noted: 2025-01-05 | Resolved: 2025-01-30

## 2025-01-30 LAB
ALBUMIN SERPL BCP-MCNC: 2.2 G/DL (ref 3.5–5.2)
ALP SERPL-CCNC: 53 U/L (ref 40–150)
ALT SERPL W/O P-5'-P-CCNC: 14 U/L (ref 10–44)
ANION GAP SERPL CALC-SCNC: 8 MMOL/L (ref 8–16)
AST SERPL-CCNC: 38 U/L (ref 10–40)
BASOPHILS # BLD AUTO: 0 K/UL (ref 0–0.2)
BASOPHILS NFR BLD: 0 % (ref 0–1.9)
BILIRUB SERPL-MCNC: 0.6 MG/DL (ref 0.1–1)
BUN SERPL-MCNC: 25 MG/DL (ref 8–23)
CALCIUM SERPL-MCNC: 7.7 MG/DL (ref 8.7–10.5)
CHLORIDE SERPL-SCNC: 109 MMOL/L (ref 95–110)
CO2 SERPL-SCNC: 21 MMOL/L (ref 23–29)
CREAT SERPL-MCNC: 0.8 MG/DL (ref 0.5–1.4)
DIFFERENTIAL METHOD BLD: ABNORMAL
EOSINOPHIL # BLD AUTO: 0 K/UL (ref 0–0.5)
EOSINOPHIL NFR BLD: 0 % (ref 0–8)
ERYTHROCYTE [DISTWIDTH] IN BLOOD BY AUTOMATED COUNT: 15.8 % (ref 11.5–14.5)
EST. GFR  (NO RACE VARIABLE): >60 ML/MIN/1.73 M^2
FOLATE SERPL-MCNC: 15.6 NG/ML (ref 4–24)
GLUCOSE SERPL-MCNC: 99 MG/DL (ref 70–110)
HCT VFR BLD AUTO: 27.6 % (ref 37–48.5)
HGB BLD-MCNC: 8.7 G/DL (ref 12–16)
IMM GRANULOCYTES # BLD AUTO: 0.02 K/UL (ref 0–0.04)
IMM GRANULOCYTES NFR BLD AUTO: 0.7 % (ref 0–0.5)
LYMPHOCYTES # BLD AUTO: 0.6 K/UL (ref 1–4.8)
LYMPHOCYTES NFR BLD: 22.1 % (ref 18–48)
MAGNESIUM SERPL-MCNC: 1.6 MG/DL (ref 1.6–2.6)
MCH RBC QN AUTO: 30.4 PG (ref 27–31)
MCHC RBC AUTO-ENTMCNC: 31.5 G/DL (ref 32–36)
MCV RBC AUTO: 97 FL (ref 82–98)
MONOCYTES # BLD AUTO: 0.2 K/UL (ref 0.3–1)
MONOCYTES NFR BLD: 6.9 % (ref 4–15)
NEUTROPHILS # BLD AUTO: 1.9 K/UL (ref 1.8–7.7)
NEUTROPHILS NFR BLD: 70.3 % (ref 38–73)
NRBC BLD-RTO: 0 /100 WBC
PHOSPHATE SERPL-MCNC: 2.7 MG/DL (ref 2.7–4.5)
PLATELET # BLD AUTO: 129 K/UL (ref 150–450)
PMV BLD AUTO: 10.2 FL (ref 9.2–12.9)
POTASSIUM SERPL-SCNC: 4.3 MMOL/L (ref 3.5–5.1)
PROT SERPL-MCNC: 5.6 G/DL (ref 6–8.4)
RBC # BLD AUTO: 2.86 M/UL (ref 4–5.4)
SODIUM SERPL-SCNC: 138 MMOL/L (ref 136–145)
WBC # BLD AUTO: 2.76 K/UL (ref 3.9–12.7)

## 2025-01-30 PROCEDURE — 84207 ASSAY OF VITAMIN B-6: CPT | Performed by: INTERNAL MEDICINE

## 2025-01-30 PROCEDURE — 82746 ASSAY OF FOLIC ACID SERUM: CPT | Performed by: INTERNAL MEDICINE

## 2025-01-30 PROCEDURE — 99232 SBSQ HOSP IP/OBS MODERATE 35: CPT | Mod: SA,HB,,

## 2025-01-30 PROCEDURE — 63600175 PHARM REV CODE 636 W HCPCS

## 2025-01-30 PROCEDURE — 84591 ASSAY OF NOS VITAMIN: CPT | Performed by: INTERNAL MEDICINE

## 2025-01-30 PROCEDURE — 25000003 PHARM REV CODE 250

## 2025-01-30 PROCEDURE — 80053 COMPREHEN METABOLIC PANEL: CPT

## 2025-01-30 PROCEDURE — 36415 COLL VENOUS BLD VENIPUNCTURE: CPT | Performed by: INTERNAL MEDICINE

## 2025-01-30 PROCEDURE — 25000003 PHARM REV CODE 250: Performed by: INTERNAL MEDICINE

## 2025-01-30 PROCEDURE — 63600175 PHARM REV CODE 636 W HCPCS: Performed by: INTERNAL MEDICINE

## 2025-01-30 PROCEDURE — 36415 COLL VENOUS BLD VENIPUNCTURE: CPT

## 2025-01-30 PROCEDURE — 85025 COMPLETE CBC W/AUTO DIFF WBC: CPT

## 2025-01-30 PROCEDURE — 84425 ASSAY OF VITAMIN B-1: CPT | Performed by: INTERNAL MEDICINE

## 2025-01-30 PROCEDURE — 84100 ASSAY OF PHOSPHORUS: CPT

## 2025-01-30 PROCEDURE — 83735 ASSAY OF MAGNESIUM: CPT

## 2025-01-30 PROCEDURE — 25000003 PHARM REV CODE 250: Performed by: HOSPITALIST

## 2025-01-30 RX ORDER — QUETIAPINE FUMARATE 100 MG/1
100 TABLET, FILM COATED ORAL NIGHTLY
Status: ON HOLD
Start: 2025-01-30 | End: 2025-03-03 | Stop reason: HOSPADM

## 2025-01-30 RX ORDER — QUETIAPINE FUMARATE 25 MG/1
25 TABLET, FILM COATED ORAL EVERY 6 HOURS PRN
Status: ON HOLD
Start: 2025-01-30 | End: 2025-03-03 | Stop reason: HOSPADM

## 2025-01-30 RX ORDER — MIRTAZAPINE 7.5 MG/1
7.5 TABLET, FILM COATED ORAL NIGHTLY
Status: DISCONTINUED | OUTPATIENT
Start: 2025-01-30 | End: 2025-01-30 | Stop reason: HOSPADM

## 2025-01-30 RX ORDER — MIRTAZAPINE 7.5 MG/1
7.5 TABLET, FILM COATED ORAL NIGHTLY
Start: 2025-01-30 | End: 2026-01-30

## 2025-01-30 RX ORDER — LANOLIN ALCOHOL/MO/W.PET/CERES
100 CREAM (GRAM) TOPICAL DAILY
Status: ON HOLD
Start: 2025-01-31 | End: 2025-03-03 | Stop reason: HOSPADM

## 2025-01-30 RX ORDER — HYDROCORTISONE 10 MG/1
10 TABLET ORAL 2 TIMES DAILY
Status: ON HOLD
Start: 2025-01-30 | End: 2025-03-03 | Stop reason: HOSPADM

## 2025-01-30 RX ORDER — SODIUM,POTASSIUM PHOSPHATES 280-250MG
2 POWDER IN PACKET (EA) ORAL
Status: ON HOLD
Start: 2025-01-30 | End: 2025-03-03 | Stop reason: HOSPADM

## 2025-01-30 RX ORDER — POTASSIUM CHLORIDE 750 MG/1
30 CAPSULE, EXTENDED RELEASE ORAL DAILY
Status: ON HOLD
Start: 2025-01-30 | End: 2025-03-03 | Stop reason: HOSPADM

## 2025-01-30 RX ORDER — HYDRALAZINE HYDROCHLORIDE 25 MG/1
25 TABLET, FILM COATED ORAL EVERY 4 HOURS PRN
Status: ON HOLD
Start: 2025-01-30 | End: 2025-03-03 | Stop reason: HOSPADM

## 2025-01-30 RX ORDER — LANOLIN ALCOHOL/MO/W.PET/CERES
400 CREAM (GRAM) TOPICAL 2 TIMES DAILY
Status: ON HOLD
Start: 2025-01-30 | End: 2025-03-03 | Stop reason: HOSPADM

## 2025-01-30 RX ADMIN — Medication 100 MG: at 08:01

## 2025-01-30 RX ADMIN — POTASSIUM & SODIUM PHOSPHATES POWDER PACK 280-160-250 MG 2 PACKET: 280-160-250 PACK at 05:01

## 2025-01-30 RX ADMIN — QUETIAPINE FUMARATE 100 MG: 100 TABLET ORAL at 08:01

## 2025-01-30 RX ADMIN — FOLIC ACID-PYRIDOXINE-CYANOCOBALAMIN TAB 2.5-25-2 MG 1 TABLET: 2.5-25-2 TAB at 08:01

## 2025-01-30 RX ADMIN — Medication 400 MG: at 08:01

## 2025-01-30 RX ADMIN — POTASSIUM CHLORIDE 30 MEQ: 750 CAPSULE, EXTENDED RELEASE ORAL at 08:01

## 2025-01-30 RX ADMIN — POTASSIUM & SODIUM PHOSPHATES POWDER PACK 280-160-250 MG 2 PACKET: 280-160-250 PACK at 08:01

## 2025-01-30 RX ADMIN — DEXTROSE MONOHYDRATE 24 MILLION UNITS: 25000 INJECTION, SOLUTION INTRAVENOUS at 05:01

## 2025-01-30 RX ADMIN — LEVETIRACETAM 500 MG: 100 INJECTION INTRAVENOUS at 08:01

## 2025-01-30 RX ADMIN — MIRTAZAPINE 7.5 MG: 7.5 TABLET, FILM COATED ORAL at 08:01

## 2025-01-30 RX ADMIN — POTASSIUM & SODIUM PHOSPHATES POWDER PACK 280-160-250 MG 2 PACKET: 280-160-250 PACK at 11:01

## 2025-01-30 RX ADMIN — ENOXAPARIN SODIUM 40 MG: 40 INJECTION SUBCUTANEOUS at 05:01

## 2025-01-30 RX ADMIN — CHLORHEXIDINE GLUCONATE 0.12% ORAL RINSE 15 ML: 1.2 LIQUID ORAL at 08:01

## 2025-01-30 RX ADMIN — HYDROCORTISONE 10 MG: 5 TABLET ORAL at 08:01

## 2025-01-30 NOTE — PLAN OF CARE
[facility]    FACILITY TRANSFER ORDERS      Patient Name: Tresa Henry  YOB: 1960    PCP: Bina Holguin NP   PCP Address: [address] [place], [place] [address]T[place] / [facility]A [facility]  PCP Phone Number: [phone]  PCP Fax: [phone]    Encounter Date: 01/30/2025    Admit to: acute inpatient    Vital Signs:  Routine    Diagnoses:   Active Hospital Problems    Diagnosis  POA    *Encephalopathy, metabolic [G93.41]  Yes     Priority: 1 - High    Syphilis [A53.9]  Yes     Priority: 2     Pressure injury of sacral region, stage 3 [L89.153]  Yes    Blood blister [T14.8XXA]  Yes    Homelessness [Z59.00]  Not Applicable    Elevated blood pressure reading [R03.0]  Yes    Hypercoagulable state [D68.59]  Yes     Due to colon cancer      Pancytopenia [D61.818]  No    Agitation [R45.1]  Yes    Dementia with psychotic disturbance [F03.92]  Yes    Quadriparesis [G82.50]  Yes     Therapy to evaluate and treat      Severe malnutrition [E43]  Yes    Encephalopathy acute [G93.40]  Yes    Bacteremia [R78.81]  Yes    Schizophrenia [F20.9]  Yes    Adrenal insufficiency [E27.40]  Yes    Tobacco dependency [F17.200]  Yes    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]  Yes    Delirium [R41.0]  Yes    Secondary spontaneous pneumothorax [J93.12]  Yes    Drug dependence [F19.20]  Yes    Thrombocytopenia [D69.6]  Yes     Monitor with daily cbc      Colon cancer [C18.9]  Yes      Resolved Hospital Problems    Diagnosis Date Resolved POA    Hypophosphatemia [E83.39] 01/30/2025 Yes    Hypokalemia [E87.6] 01/30/2025 No     Monitor with daily labs  replace      Hypernatremia [E87.0] 01/30/2025 Yes    MSSA bacteremia [R78.81, B95.61] 01/30/2025 Yes    Influenza A with pneumonia [J09.X1] 01/30/2025 Yes    Acute hypoxemic respiratory failure [J96.01] 01/30/2025 Yes    Sepsis with acute organ dysfunction [A41.9, R65.20] 01/30/2025 Yes       Allergies:  Review of patient's allergies indicates:   Allergen Reactions    Haldol  [haloperidol lactate] Hallucinations    Divalproex Rash    Risperdal [risperidone] Palpitations       Diet:  Dysphagia therapy  Diet recommendations:  Minced & Moist Diet - IDDSI Level 5, Liquid Diet Level: Thin liquids - IDDSI Level 0   Aspiration Precautions:   1 bite/sip at a time  Assistance with meals  Eliminate distractions  Feed only when awake/alert  HOB to 90 degrees  Meds whole buried in puree  Strict aspiration precautions     Activities: Up with assistance    Goals of Care Treatment Preferences:  Code Status: Full Code      Nursing: routine; fall precautions, delirium precautions; seizure precuations; neurochecks qshift     Labs: cmp and cbc every 48 hrs    CONSULTS:    Physical Therapy to evaluate and treat. , Occupational Therapy to evaluate and treat., Speech Therapy to evaluate and treat for Swallowing and Cognition., and  to evaluate for community resources/long-range planning.    MISCELLANEOUS CARE:  Routine Skin for Bedridden Patients: Apply moisture barrier cream to all skin folds and wet areas in perineal area daily and after baths and all bowel movements.    WOUND CARE ORDERS  Left heel:   Cleanse with:;Other (see comments)  (bath wipes)    Dressing Applied;Other (comment)  (triad)   Periwound Care Moisture barrier applied       Medications: Review discharge medications with patient and family and provide education.         Medication List        START taking these medications      D5W SolP 500 mL with penicillin G potassium 5 million unit SolR 24 Million Units  Inject 24 Million Units into the vein once daily. CONTINUOUS INFUSION     folic acid-vit B6-vit B12 2.5-25-2 mg 2.5-25-2 mg Tab  Commonly known as: FOLBIC or Equiv  Take 1 tablet by mouth once daily.  Start taking on: January 31, 2025     hydrALAZINE 25 MG tablet  Commonly known as: APRESOLINE  Take 1 tablet (25 mg total) by mouth every 4 (four) hours as needed (SBP >200 or DBP >100).     hydrocortisone 10 MG Tab  Commonly  known as: CORTEF  Take 1 tablet (10 mg total) by mouth 2 (two) times daily. for 2 days     magnesium oxide 400 mg (241.3 mg magnesium) tablet  Commonly known as: MAG-OX  Take 1 tablet (400 mg total) by mouth 2 (two) times daily.     mirtazapine 7.5 MG Tab  Commonly known as: REMERON  Take 1 tablet (7.5 mg total) by mouth every evening.     potassium chloride 10 MEQ Cpsr  Commonly known as: MICRO-K  Take 3 capsules (30 mEq total) by mouth once daily. DO NOT CRUSH OR CHEW; SWALLOW WHOLE.     potassium, sodium phosphates 280-160-250 mg Pwpk  Commonly known as: PHOS-NAK  Take 2 packets by mouth 4 (four) times daily before meals and nightly.     * QUEtiapine 100 MG Tab  Commonly known as: SEROQUEL  Take 1 tablet (100 mg total) by mouth every evening.     * QUEtiapine 25 MG Tab  Commonly known as: SEROQUEL  Take 1 tablet (25 mg total) by mouth every 6 (six) hours as needed (non-directed agitaiton).     thiamine 100 MG tablet  Take 1 tablet (100 mg total) by mouth once daily.  Start taking on: January 31, 2025           * This list has 2 medication(s) that are the same as other medications prescribed for you. Read the directions carefully, and ask your doctor or other care provider to review them with you.                CONTINUE taking these medications      levETIRAcetam 500 MG Tab  Commonly known as: KEPPRA  TAKE ONE TABLET BY MOUTH 2 TIMES A DAY            STOP taking these medications      atorvastatin 10 MG tablet  Commonly known as: LIPITOR     benztropine 1 MG tablet  Commonly known as: COGENTIN     carBAMazepine 200 mg tablet  Commonly known as: TEGRETOL     chlorproMAZINE 100 MG tablet  Commonly known as: THORAZINE     clonazePAM 1 MG tablet  Commonly known as: KlonoPIN     ibuprofen 800 MG tablet  Commonly known as: ADVIL,MOTRIN     LINZESS 290 mcg Cap capsule  Generic drug: linaCLOtide     lurasidone 80 mg Tab tablet  Commonly known as: LATUDA     prazosin 1 MG Cap  Commonly known as: MINIPRESS                 Immunizations Administered as of 1/30/2025       No immunizations on file.                 _________________________________  Jorge Degroot MD  01/30/2025

## 2025-01-30 NOTE — PLAN OF CARE
Problem: Adult Inpatient Plan of Care  Goal: Patient-Specific Goal (Individualized)  Description: Pt will maintain sbp below 200.  Outcome: Progressing  Flowsheets (Taken 1/30/2025 0225)  Individualized Care Needs: care clustered  Anxieties, Fears or Concerns: none  Goal: Optimal Comfort and Wellbeing  Outcome: Progressing  Intervention: Monitor Pain and Promote Comfort  Flowsheets (Taken 1/30/2025 0225)  Pain Management Interventions: care clustered  Intervention: Provide Person-Centered Care  Flowsheets (Taken 1/30/2025 0225)  Trust Relationship/Rapport:   care explained   choices provided     Acetaminophen given x 1 for pain. Penicillin G continued. VSS. Bed in lowest position, side rails x 3, and call light in use.

## 2025-01-30 NOTE — PLAN OF CARE
[facility] - Neurosurgery ([facility])  Discharge Reassessment    Primary Care Provider: Bina Holguin NP    Expected Discharge Date: 2/5/2025    MD advised that patient is medically ready.  [facility]. Patient to go for halfway.  CM left voicemail for daughter to return call.  [facility] may not have a bed but [facility] should. CM left a 2nd voicemail for return call.    3:00 CM left 3rd voicemail for return call to discuss transfer to [facility].  Per STEFFEN Gandhi Case Management, ok to request transfer but family needs to be informed since patient is confused. Transfer request initiated for [facility].    4:35 4th attempt made to speak with daughter. CM left 4th voicemail advising patient would return to [facility] that the medical needs have been met and [facility] can work on continuing to place patient.  Per STEFFEN Gandhi Case Management, ok to discharge patient to [facility]. Cm will provide handoff to assigned SW/CM at [facility] on Friday morning.    Discharge Plan A and Plan B have been determined by review of patient's clinical status, future medical and therapeutic needs, and coverage/benefits for post-acute care in coordination with multidisciplinary team members.     Reassessment (most recent)       Discharge Reassessment - 01/30/25 1102          Discharge Reassessment    Assessment Type Discharge Planning Reassessment     Did the patient's condition or plan change since previous assessment? Yes     Discharge Plan discussed with: Adult children     Communicated NICK with patient/caregiver Yes     Discharge Plan A Skilled Nursing Facility     Discharge Plan B New Nursing Home placement - halfway care facility     DME Needed Upon Discharge  none     Transition of Care Barriers Underinsured;Homeless     Why the patient remains in the hospital Placement issues        Post-Acute Status    Post-Acute Authorization Placement      Post-Acute Placement Status Pending post-acute provider review/more information requested     Discharge Delays Post-Acute Set-up

## 2025-01-30 NOTE — NURSING
This nurse attempted to call report to the receiving facility @ [phone] but was instructed by their charge nurse to call back once the patient has left related to possible change of shift.

## 2025-01-30 NOTE — PROGRESS NOTES
[facility] - Neurosurgery ([facility])  [facility]  Progress Note    Patient Name: Tresa Henry  MRN: [id number]  Patient Class: IP- Inpatient   Admission Date: 1/20/2025  Length of Stay: 10 days  Attending Physician: Jorge Degroot MD  Primary Care Provider: Bina Holguin NP        Subjective     Principal Problem:Encephalopathy, metabolic           HPI:  Tresa Henry is 64 y.o. F with Mhx of f substance use, psychosis, bipolar disorder, HCV, colon cancer, and hyperlipidemia presented to [facility] ICU on 01/05 with AMS. EMS was called to scene where pt was found confused, tachycardic and in pain. Concern for potential drug intoxication and/or withdrawal per EMS. Required admission to ICU for the AMS, Mutlifocal PNA and HUSEYIN. Found to have MSSA bacteremia and positive for influenza A; treated with abx and Tamiflu, respectively. Brief step down from the ICU on 01/09 with improved mentation. Hospital course complicated by large R sided pneumothorax s/p chest tube placement, thrombocytopenia, neutropenia, and hyponatremia. Stepped back to ICU on 01/11 for subsequently requiring intubation, central line, and arterial line for concerns of ARDS; extubated on 01/14. Subsequent Bcx NGTD and NGT placed for nutrition. She required BiPAP intermittently for work breathing for a period of time, but has been on stable nasal cannula recently. Of recent, pt unable to her R foot, followed by inability to arms. Despite prolonged admission and care, patient continued to experience AMS.       With persistent encephalopathy of uncertain etiology, referring team is requested transfer to [facility] for continued treatment of acute encephalopathy.      Prior to transfer :  Na 148, K 4.1, chloride 113, CO2 25, BUN 48, creatinine 1, glucose 144, albumin 2.4, AST 29, ALT 11, magnesium 1.8, phosphorus 2.2, white blood cells 5.69, hemoglobin 8.6, hematocrit 27.3, platelets 118  -pH 7.53, pCO2 36, pO2 84  -chest x-ray  "showed interval removal of the small bore chest tube from the right hemithorax.  Decrease in the consolidation in lungs bilaterally since the prior examination.    Overview/Hospital Course:  Pt hospital course was complicated by MSSA bacteremia, influenza A, multifocal PNA, and HUSEYIN, necessitating ICU admission. She required intubation and central/arterial line placement due to ARDS and was later extubated on 01/14. Despite treatment, including ABx and Tamiflu, her AMS persisted. Subsequent complications included a R pneumothorax s/p chest tube placement, thrombocytopenia, neutropenia, and hyponatremia. Of note, she has shown some improvement recently, with a slightly more conversant state, though she remains below her cognitive baseline. She is no longer bacteremic, but her azotemia (BUN 49) and hypernatremia (146) persist. MRI brain w/wo was negative, and EEG without epileptiform activity. Neurology will f/u in clinic at [place]. Mentation notably improved on 1/24, awake and alert though oriented x1 but w/ fluctuations; thus ID began doxycycline for possible neurosyphilis tx.  Neurology assessed no reversible causes for her encephalopathy and feels she has delirium on dementia and does not recommend another LP (last LP did not send VDRL but was very unremarkable). Psychiatry seeing her and feels he also she is delirium on dementia Patient has history of memory loss and had work up in 2022. Mentation worsened dramatically after she lost her home November 2025. Family and patient reports chronically poor appetite ("anorexia"). Patient showed limited po intake    Interval History:  Patient is seen today.  No reports of any significant pain.  She reports that she ate breakfast pretty well.  However nursing at the bedside reports she ate no more than 25% of her breakfast.    Review of Systems  Objective:     Vital Signs (Most Recent):  Temp: 98.6 °F (37 °C) (01/30/25 0811)  Pulse: 89 (01/30/25 0811)  Resp: 18 (01/30/25 " 0811)  BP: (!) 147/83 (01/30/25 0811)  SpO2: 99 % (01/30/25 0811) Vital Signs (24h Range):  Temp:  [98.4 °F (36.9 °C)-99.2 °F (37.3 °C)] 98.6 °F (37 °C)  Pulse:  [] 89  Resp:  [16-18] 18  SpO2:  [97 %-100 %] 99 %  BP: (135-160)/(74-96) 147/83     Weight: 64 kg (141 lb 1.5 oz)  Body mass index is 22.1 kg/m².    Intake/Output Summary (Last 24 hours) at 1/30/2025 1116  Last data filed at 1/30/2025 1002  Gross per 24 hour   Intake 180 ml   Output 650 ml   Net -470 ml         Physical Exam      Clear lungs bilaterally, unlabored breathing, on room air, no cyanosis   Heart sounds indicate a regular rate and rhythm  Awake alert, no acute distress   No facial droop, no slurred speech   Pupils equal bilaterally   4/5 fist  bilaterally   2/5 hip flexor strength bilaterally   No obvious upper nor lower extremity edema   Making good eye contact   Oriented to self and time    Assessment and Plan     * Encephalopathy, metabolic  Found by EMS with high suspicion for substance use. UDS THC presumed positive. CT head without acute intracranial pathology. HIV hegative, RPR positive.  Neurology deems no reversible tx'd causes at this time, suspect multifactorial etiology.   - neurochecks  - aspiration, delirium and fall precautions  Likely with delirium on dementia  Waxing/waning - could be new baseline  Cannot live alone w/ current impaired cognitive state    Syphilis  9/2023 FTA Abs weekly positive; 8/18/2024 FTA reactive and weakly reactive per office of public health - she was not treated as felt to be false positives  RPR 1:1 here  ID saw patient. Recommended treatments based on our suspicion of syphilis or neurosyphilis  Given terrible mental status and little improvement since admission will treat for neurosyphilis - continuous penicillin G 24 million units daily until enteral intake is stable and sustained (then can switch to doxycyline 100 mg  BID) for 21 days total.    Elevated blood pressure reading  Not sure if  from IV hydrocortisone  If BPs consistently still elevated, when start antihypertensive medicine    Homelessness  Per daughter, patient has no home.    Schizophrenia  Psychiatry saw patient - zyprexa and cogentin stopped  Quetiapine 50 mg qhs    Severe malnutrition  Minimal intake since admission  Oral intake not improved as her alertness improved  Nursing to assist with all feedings  She may need PEG at this time - calorie count    Quadriparesis  Requires mod to max assist for all mobility, transfers and ADLs    Dementia with psychotic disturbance  Per daughter patient has had work up for memory loss a year ago. Progressively worsened after she lost her home in November.     Adrenal insufficiency  Relative adrenal insufficiency  Hydrocortisone started during sepsis three weeks ago  Wean off over a weak    Will not treat associated elevated blood pressures    Paralysis of right common peroneal nerve  -- will f/u in neuro clinic at [place]       Tobacco dependency  Dangers of cigarette smoking were reviewed with patient in detail. Patient was Counseled for 3-10 minutes. Nicotine replacement options were discussed. Nicotine replacement was discussed- not prescribed per patient's request    Delirium  Patient with acute delirium. There is no specific treatment. Will avoid narcotics/benzos that are known to worsen condition and add PRN antipsychotics to limit behaviors of self harm. Monitor closely.  Fall and delirium precautions.     Secondary spontaneous pneumothorax  S/p chest tube placement and adequate re-expansion on repeat CXR. Etiology of pneumothorax potentially from substance inhalation causing lung damage overtime. CT c/a/p 10/2024 with evidence of aerated lungs; however severe lung damage with evidence of ephysema and opacification noted on most recent CT.     Drug dependence  C/f polysubstance abuse. UDS 01/05 presumed positive for THC. Completed Klonopin taper per Psych at OSH for potential benzo withdrawal. Not  currently active use. Patient does not have mental or physical capacity to seek, find and take drugs.     Colon cancer  -h/o noted      VTE Risk Mitigation (From admission, onward)           Ordered     enoxaparin injection 40 mg  Every 24 hours         01/24/25 1503     Place sequential compression device  Until discontinued         01/20/25 0859     IP VTE HIGH RISK PATIENT  Once         01/20/25 0853                    Discharge Planning   NICK: 2/5/2025     Code Status: Full Code   Medical Readiness for Discharge Date: 1/30/2025  Discharge Plan A: Skilled Nursing Facility   Discharge Delays: (!) Post-Acute Set-up            Please place Justification for DME        Jorge Degroot MD  Department of Hospital Medicine   [facility] - Neurosurgery ([facility])

## 2025-01-30 NOTE — PT/OT/SLP PROGRESS
Speech Language Pathology      Tresa Henry  MRN: [id number]    Patient not seen today though continues to benefit from ongoing speech services. Will follow-up for ongoing speech services. Pt remains on track to meet POC at this time. Continue with most recent recommendations as outlined below:      General Recommendations:  Dysphagia therapy  Diet recommendations:  Minced & Moist Diet - IDDSI Level 5, Liquid Diet Level: Thin liquids - IDDSI Level 0   Aspiration Precautions:   1 bite/sip at a time  Assistance with meals  Eliminate distractions  Feed only when awake/alert  HOB to 90 degrees  Meds whole buried in puree  Strict aspiration precautions   General Precautions: Standard, aspiration, droplet, fall  Communication strategies:  none      1/30/2025

## 2025-01-30 NOTE — SUBJECTIVE & OBJECTIVE
Interval History:  Patient is seen today.  No reports of any significant pain.  She reports that she ate breakfast pretty well.  However nursing at the bedside reports she ate no more than 25% of her breakfast.    Review of Systems  Objective:     Vital Signs (Most Recent):  Temp: 98.6 °F (37 °C) (01/30/25 0811)  Pulse: 89 (01/30/25 0811)  Resp: 18 (01/30/25 0811)  BP: (!) 147/83 (01/30/25 0811)  SpO2: 99 % (01/30/25 0811) Vital Signs (24h Range):  Temp:  [98.4 °F (36.9 °C)-99.2 °F (37.3 °C)] 98.6 °F (37 °C)  Pulse:  [] 89  Resp:  [16-18] 18  SpO2:  [97 %-100 %] 99 %  BP: (135-160)/(74-96) 147/83     Weight: 64 kg (141 lb 1.5 oz)  Body mass index is 22.1 kg/m².    Intake/Output Summary (Last 24 hours) at 1/30/2025 1116  Last data filed at 1/30/2025 1002  Gross per 24 hour   Intake 180 ml   Output 650 ml   Net -470 ml         Physical Exam      Clear lungs bilaterally, unlabored breathing, on room air, no cyanosis   Heart sounds indicate a regular rate and rhythm  Awake alert, no acute distress   No facial droop, no slurred speech   Pupils equal bilaterally   4/5 fist  bilaterally   2/5 hip flexor strength bilaterally   No obvious upper nor lower extremity edema   Making good eye contact   Oriented to self and time

## 2025-01-30 NOTE — PLAN OF CARE
[facility] - Neurosurgery (Hospital)  Discharge Final Note    Primary Care Provider: Bina Holguin NP    Expected Discharge Date: 1/30/2025    Patient to be discharged to [facility].  Acute care needs met. [facility] to follow up on discharge planning.  [facility] to provide transportation.      Update:  1/31/2025 CM left voicemail for [facility] SW/CM for return call to give an update on this case.  CM left 5th voicemail for scotty Clark this am. CM attempted 2nd contact number and was unable to leave a message.    9:08 Daughter Nelly returned call and was advised patient transferred back to [facility].  Amber was in agreement with this plan.    Future Appointments   Date Time Provider Department Center   2/6/2025  8:00 AM Saray Serna MD [facility] NEURO [facility]        Final Discharge Note (most recent)       Final Note - 01/30/25 1651          Final Note    Assessment Type Final Discharge Note     Anticipated Discharge Disposition Another Health Care Institution Not Defined        Post-Acute Status    Discharge Delays None known at this time                     Important Message from Medicare

## 2025-01-30 NOTE — PROGRESS NOTES
"CONSULTATION LIAISON PSYCHIATRY PROGRESS NOTE    Patient Name: Tresa Henry  MRN: [id number]  Patient Class: IP- Inpatient  Admission Date: 1/20/2025  Attending Physician: Jorge Degroot MD      SUBJECTIVE:   Tresa Henry is a 64 y.o. female with past psychiatric history of schizophrenia spectrum vs bipolar disorder w psychotic features, methamphetamine use and cannabis use & past pertinent medical history of HLD, hep C, colon cancer s/p resection presents to the ED/admitted to the hospital for AMS.     Patient presented to OSH on 1/5 after EMS was contacted by unknown caller expressing concern for patient. On arrival, EMS found several people at patient's house intoxication. Patient noted to be confused on arrival and was taken to ED for evaluation. UDS + THC. She was found to be bacteremic and influenza positive with an HUSEYIN and electrolyte derangements. Patient's mentation improved but she subsequently became more agitated and was found to have pneumothorax. She was intubated for treatment and extubated on 1/14. She was transferred to [facility] for further management of acute encephalopathy.     Psychiatry consulted for "history of schizophrenia, but with dramatic waxing and waning mental status. Can we hold her psych meds to rule them out as cause? She is much less responsive and she has catatonia?"     Interval History: NAEON. No PRNs administered.    Today, pt lying in bed. Oriented to self and year only. States the month is September, location is [place], and she is in the hospital for mental illness. Reports she was unable to sleep well last night but is unable to comment on reason why or if she has been sleeping well previously. When asked about AVH she states "well I always have it. It hasn't been bad." She goes on to state the hallucinations are not bothersome and they do not tell her to hurt herself or others. Unable to spell WORLD forwards or backwards. Denies SI/HI.     OBJECTIVE:    Mental " Status Exam:  General Appearance: poorly groomed, disheveled  Behavior: cooperative  Involuntary Movements and Motor Activity: no abnormal involuntary movements noted; no tics, no tremors, no akathisia, no dystonia, no evidence of tardive dyskinesia; no psychomotor agitation or retardation  Gait and Station: unable to assess - patient lying down or seated  Speech and Language: normal rate, normal rhythm, normal volume  Mood: fine  Affect: constricted  Thought Process and Associations: disorganized  Perceptual Disturbances: hallucinations:  auditory and visual  Thought Content and Perceptions:: no suicidal ideation, + auditory hallucinations, + visual hallucinations  Sensorium and Orientation: delirious, waxing and waning  Recent and Remote Memory: impaired  Attention and Concentration:  improved attentiveness to conversation, spells house forward, incorrectly spelled backward  Fund of Knowledge: impaired  Insight: impaired  Judgment: limited    CAM ICU positive? yes      ASSESSMENT & RECOMMENDATIONS   Unspecified schizophrenia spectrum and other psychotic disorders  Unspecified delirium   H/O methamphetamine use disorder     Scheduled Medication(s):  Discontinue benztropine as anticholinergic medications can worsen delirium   Continue Seroquel to 100 mg nightly for treatment of delirium and h/o psychotic disorder     PRN Medication(s):  Seroquel 25 mg q6hr PRN for non-redirectable agitation.      Other Recommendations (labs, imaging, further consults, etc.):  Please provide patient assistance with meals       Delirium Behavior Management  PLEASE utilize PRN meds first for agitation. Minimize use of PHYSICAL restraints OR have periods of being out of physical restraints if possible.  Keep window shades open and room lit during day and room dim at night in order to promote normal sleep-wake cycles  Encourage family at bedside. [place] patient often to situation, location, date.  Continue to Limit or Discontinue use of  Narcotics, Benzos and Anti-cholinergic medications as they may worsen delirium.  Continue medical workup for causative etiology of Delirium.     Medical dx complicating psych dx  Syphilis  - RPR 1:1   - Started on empiric neurosyphilis treatment with penicillin  - recommend Neurology consult and LP to r/o active infx and possible Neurosyphilis      Sepsis, resolved              - MSSA bacteremia s/p Abx, subsequent Bcx clear  - UA without LE, nitrates, pyuria      Adrenal insufficiency   - 2/2 sepsis   - on steroid taper      Risk Assessment / Legal Status  Patient does not meet criteria for PEC or involuntary inpatient psychiatric admission at this time. Recommend to rescind PEC if one was placed. Patient is not currently an imminent danger to self or others and is not gravely disabled due to a psychiatric illness.    Follow-up:  Will follow-up while in house.    Disposition:   Defer to primary team.    Please contact ON CALL psychiatry service (24/7) for any acute issues that may arise.    Anton Christianson, PMHNP  [facility]  1/30/2025 11:29 AM        --------------------------------------------------------------------------------------------------------------------------------------------------------------------------------------------------------------------------------------    CONTINUED OBJECTIVE clinical data & findings reviewed and noted for above decision making    Current Medications:   Scheduled Meds:    chlorhexidine  15 mL Mouth/Throat BID    enoxparin  40 mg Subcutaneous Q24H (prophylaxis, 1700)    folic acid-vit B6-vit B12 2.5-25-2 mg  1 tablet Oral Daily    hydrocortisone  10 mg Oral BID    levETIRAcetam (Keppra) IV (PEDS and ADULTS)  500 mg Intravenous BID    magnesium oxide  400 mg Oral BID    penicillin G potassium 24 Million Units in D5W 500 mL CONTINUOUS INFUSION  24 Million Units Intravenous Q24H    potassium chloride  30 mEq Oral with breakfast    potassium, sodium  phosphates  2 packet Oral QID (AC & HS)    QUEtiapine  100 mg Oral QHS    thiamine  100 mg Oral Daily     PRN Meds:   Current Facility-Administered Medications:     0.9%  NaCl infusion (for blood administration), , Intravenous, Q24H PRN    acetaminophen, 650 mg, Oral, Q4H PRN    hydrALAZINE, 25 mg, Oral, Q4H PRN    naloxone, 0.02 mg, Intravenous, PRN    QUEtiapine, 25 mg, Oral, Q6H PRN    sodium chloride 0.9%, 10 mL, Intravenous, Q12H PRN    Allergies:   Review of patient's allergies indicates:   Allergen Reactions    Haldol [haloperidol lactate] Hallucinations    Divalproex Rash    Risperdal [risperidone] Palpitations       Vitals  Vitals:    01/30/25 0811   BP: (!) 147/83   Pulse: 89   Resp: 18   Temp: 98.6 °F (37 °C)       Labs/Imaging/Studies:  Recent Results (from the past 24 hours)   CBC Auto Differential    Collection Time: 01/29/25  5:17 PM   Result Value Ref Range    WBC 4.70 3.90 - 12.70 K/uL    RBC 3.64 (L) 4.00 - 5.40 M/uL    Hemoglobin 11.2 (L) 12.0 - 16.0 g/dL    Hematocrit 33.1 (L) 37.0 - 48.5 %    MCV 91 82 - 98 fL    MCH 30.8 27.0 - 31.0 pg    MCHC 33.8 32.0 - 36.0 g/dL    RDW 16.0 (H) 11.5 - 14.5 %    Platelets 171 150 - 450 K/uL    MPV 10.1 9.2 - 12.9 fL    Immature Granulocytes 0.4 0.0 - 0.5 %    Gran # (ANC) 3.5 1.8 - 7.7 K/uL    Immature Grans (Abs) 0.02 0.00 - 0.04 K/uL    Lymph # 0.9 (L) 1.0 - 4.8 K/uL    Mono # 0.3 0.3 - 1.0 K/uL    Eos # 0.0 0.0 - 0.5 K/uL    Baso # 0.00 0.00 - 0.20 K/uL    nRBC 0 0 /100 WBC    Gran % 74.1 (H) 38.0 - 73.0 %    Lymph % 18.5 18.0 - 48.0 %    Mono % 7.0 4.0 - 15.0 %    Eosinophil % 0.0 0.0 - 8.0 %    Basophil % 0.0 0.0 - 1.9 %    Differential Method Automated    Phosphorus    Collection Time: 01/30/25  3:18 AM   Result Value Ref Range    Phosphorus 2.7 2.7 - 4.5 mg/dL   Magnesium    Collection Time: 01/30/25  3:18 AM   Result Value Ref Range    Magnesium 1.6 1.6 - 2.6 mg/dL   Comprehensive Metabolic Panel    Collection Time: 01/30/25  3:18 AM   Result Value  Ref Range    Sodium 138 136 - 145 mmol/L    Potassium 4.3 3.5 - 5.1 mmol/L    Chloride 109 95 - 110 mmol/L    CO2 21 (L) 23 - 29 mmol/L    Glucose 99 70 - 110 mg/dL    BUN 25 (H) 8 - 23 mg/dL    Creatinine 0.8 0.5 - 1.4 mg/dL    Calcium 7.7 (L) 8.7 - 10.5 mg/dL    Total Protein 5.6 (L) 6.0 - 8.4 g/dL    Albumin 2.2 (L) 3.5 - 5.2 g/dL    Total Bilirubin 0.6 0.1 - 1.0 mg/dL    Alkaline Phosphatase 53 40 - 150 U/L    AST 38 10 - 40 U/L    ALT 14 10 - 44 U/L    eGFR >60.0 >60 mL/min/1.73 m^2    Anion Gap 8 8 - 16 mmol/L   CBC Auto Differential    Collection Time: 01/30/25  3:18 AM   Result Value Ref Range    WBC 2.76 (L) 3.90 - 12.70 K/uL    RBC 2.86 (L) 4.00 - 5.40 M/uL    Hemoglobin 8.7 (L) 12.0 - 16.0 g/dL    Hematocrit 27.6 (L) 37.0 - 48.5 %    MCV 97 82 - 98 fL    MCH 30.4 27.0 - 31.0 pg    MCHC 31.5 (L) 32.0 - 36.0 g/dL    RDW 15.8 (H) 11.5 - 14.5 %    Platelets 129 (L) 150 - 450 K/uL    MPV 10.2 9.2 - 12.9 fL    Immature Granulocytes 0.7 (H) 0.0 - 0.5 %    Gran # (ANC) 1.9 1.8 - 7.7 K/uL    Immature Grans (Abs) 0.02 0.00 - 0.04 K/uL    Lymph # 0.6 (L) 1.0 - 4.8 K/uL    Mono # 0.2 (L) 0.3 - 1.0 K/uL    Eos # 0.0 0.0 - 0.5 K/uL    Baso # 0.00 0.00 - 0.20 K/uL    nRBC 0 0 /100 WBC    Gran % 70.3 38.0 - 73.0 %    Lymph % 22.1 18.0 - 48.0 %    Mono % 6.9 4.0 - 15.0 %    Eosinophil % 0.0 0.0 - 8.0 %    Basophil % 0.0 0.0 - 1.9 %    Differential Method Automated    Folate    Collection Time: 01/30/25  7:59 AM   Result Value Ref Range    Folate 15.6 4.0 - 24.0 ng/mL

## 2025-01-31 PROBLEM — A52.3 NEUROSYPHILIS: Status: ACTIVE | Noted: 2025-01-24

## 2025-01-31 PROBLEM — F20.9 SCHIZOPHRENIA: Status: RESOLVED | Noted: 2025-01-27 | Resolved: 2025-01-31

## 2025-01-31 NOTE — DISCHARGE SUMMARY
[facility] - Neurosurgery ([facility])  [facility] Medicine  Discharge Summary      Patient Name: Tresa Henry  MRN: [id number]  NARCISA: [id number]  Patient Class: IP- Inpatient  Admission Date: 1/20/2025  Hospital Length of Stay: 10 days  Discharge Date and Time: 1/30/2025 10:39 PM  Attending Physician: No att. providers found   Discharging Provider: Jorge Degroot MD  Primary Care Provider: Bina Holguin NP  [facility] Medicine Team: [facility] Jorge Degroot MD  Primary Care Team: [facility]    HPI:   Tresa Henry is 64 y.o. F with Mhx of f substance use, psychosis, bipolar disorder, HCV, colon cancer, and hyperlipidemia presented to [facility] ICU on 01/05 with AMS. EMS was called to scene where pt was found confused, tachycardic and in pain. Concern for potential drug intoxication and/or withdrawal per EMS. Required admission to ICU for the AMS, Mutlifocal PNA and HUSEYIN. Found to have MSSA bacteremia and positive for influenza A; treated with abx and Tamiflu, respectively. Brief step down from the ICU on 01/09 with improved mentation. Hospital course complicated by large R sided pneumothorax s/p chest tube placement, thrombocytopenia, neutropenia, and hyponatremia. Stepped back to ICU on 01/11 for subsequently requiring intubation, central line, and arterial line for concerns of ARDS; extubated on 01/14. Subsequent Bcx NGTD and NGT placed for nutrition. She required BiPAP intermittently for work breathing for a period of time, but has been on stable nasal cannula recently. Of recent, pt unable to her R foot, followed by inability to arms. Despite prolonged admission and care, patient continued to experience AMS.       With persistent encephalopathy of uncertain etiology, referring team is requested transfer to [facility] for continued treatment of acute encephalopathy.      Prior to transfer :  Na 148, K 4.1, chloride 113, CO2 25, BUN 48, creatinine 1, glucose 144, albumin 2.4, AST 29, ALT  "11, magnesium 1.8, phosphorus 2.2, white blood cells 5.69, hemoglobin 8.6, hematocrit 27.3, platelets 118  -pH 7.53, pCO2 36, pO2 84  -chest x-ray showed interval removal of the small bore chest tube from the right hemithorax.  Decrease in the consolidation in lungs bilaterally since the prior examination.    * No surgery found *      Hospital Course:   Pt hospital course was complicated by MSSA bacteremia, influenza A, multifocal PNA, and HUSEYIN, necessitating ICU admission. She required intubation and central/arterial line placement due to ARDS and was later extubated on 01/14. Despite treatment, including ABx and Tamiflu, her AMS persisted. Subsequent complications included a R pneumothorax s/p chest tube placement, thrombocytopenia, neutropenia, and hyponatremia. Of note, she has shown some improvement recently, with a slightly more conversant state, though she remains below her cognitive baseline. She is no longer bacteremic, but her azotemia (BUN 49) and hypernatremia (146) persist. MRI brain w/wo was negative, and EEG without epileptiform activity. Neurology will f/u in clinic at [place]. Mentation notably improved on 1/24, awake and alert though oriented x1 but w/ fluctuations; thus ID began doxycycline for possible neurosyphilis tx (was switched over to continous pencillin with option of reverting back to doxycyline po at time of discharge).  Neurology assessed no reversible causes for her encephalopathy and feels she has delirium on dementia and does not recommend another LP (last LP did not send VDRL but was very unremarkable). Psychiatry seeing her and feels he also she is delirium on dementia Patient has history of memory loss and had work up in 2022. Mentation worsened dramatically after she lost her home November 2025. Family and patient reports chronically poor appetite ("anorexia"). Patient showed limited po intake.  Patient no longer needed higher level subspecialty care at main [facility] and was transferred " back to original hospital.          Goals of Care Treatment Preferences:  Code Status: Full Code      SDOH Screening:  The patient was screened for utility difficulties, food insecurity, transport difficulties, housing insecurity, and interpersonal safety and there were no concerns identified this admission.     Consults:   Consults (From admission, onward)          Status Ordering Provider     Inpatient consult to Registered Dietitian/Nutritionist  Once        Provider:  (Not yet assigned)    Completed HERMILA WILCOX     Inpatient consult to Midline team  Once        Provider:  (Not yet assigned)    Completed HERMILA WILCOX     Inpatient consult to Neurology  Once        Provider:  (Not yet assigned)    Completed HERMILA WICLOX     Inpatient consult to Psychiatry  Once        Provider:  (Not yet assigned)    Completed HERMILA WILCOX     Inpatient consult to Skin Integrity  Practitioner  Once        Provider:  (Not yet assigned)    Completed DWAYNE PERSAUD     Inpatient consult to Infectious Diseases  Once        Provider:  (Not yet assigned)    Completed ALBER SALAZAR     Inpatient consult to Neurology  Once        Provider:  (Not yet assigned)    Completed MARICRUZ AUGUSTIN            * Encephalopathy, metabolic  Found by EMS with high suspicion for substance use. UDS THC presumed positive. CT head without acute intracranial pathology. HIV hegative, RPR positive.  Neurology deems no reversible tx'd causes at this time, suspect multifactorial etiology.   - neurochecks  - aspiration, delirium and fall precautions  Likely with delirium on dementia  Waxing/waning - could be new baseline  Cannot live alone w/ current impaired cognitive state    Neurosyphilis  9/2023 FTA Abs weekly positive; 8/18/2024 FTA reactive and weakly reactive per office of public health - she was not treated as felt to be false positives  RPR 1:1 here  ID saw patient. Recommended treatments based on our suspicion of syphilis or  neurosyphilis  Given terrible mental status and little improvement since admission will treat for neurosyphilis - continuous penicillin G 24 million units daily until enteral intake is stable and sustained (then can switch to doxycyline 100 mg  BID) for 21 days total.    Elevated blood pressure reading  Not sure if from IV hydrocortisone  If BPs consistently still elevated, when start antihypertensive medicine    Homelessness  Per daughter, patient has no home.    Severe malnutrition  Minimal intake since admission  Oral intake not improved as her alertness improved  Nursing to assist with all feedings  She may need PEG at this time - calorie count    Quadriparesis  Requires mod to max assist for all mobility, transfers and ADLs    Dementia with psychotic disturbance  Per daughter patient has had work up for memory loss a year ago. Progressively worsened after she lost her home in November.     Adrenal insufficiency  Relative adrenal insufficiency  Hydrocortisone started during sepsis three weeks ago  Wean off over a weak    Will not treat associated elevated blood pressures    Paralysis of right common peroneal nerve  -- will f/u in neuro clinic at [place]       Tobacco dependency  Dangers of cigarette smoking were reviewed with patient in detail. Patient was Counseled for 3-10 minutes. Nicotine replacement options were discussed. Nicotine replacement was discussed- not prescribed per patient's request    Delirium  Patient with acute delirium. There is no specific treatment. Will avoid narcotics/benzos that are known to worsen condition and add PRN antipsychotics to limit behaviors of self harm. Monitor closely.  Fall and delirium precautions.     Secondary spontaneous pneumothorax  S/p chest tube placement and adequate re-expansion on repeat CXR. Etiology of pneumothorax potentially from substance inhalation causing lung damage overtime. CT c/a/p 10/2024 with evidence of aerated lungs; however severe lung damage with  evidence of ephysema and opacification noted on most recent CT.     Drug dependence  C/f polysubstance abuse. UDS 01/05 presumed positive for THC. Completed Klonopin taper per Psych at OSH for potential benzo withdrawal. Not currently active use. Patient does not have mental or physical capacity to seek, find and take drugs.     Colon cancer  -h/o noted      Final Active Diagnoses:    Diagnosis Date Noted POA    PRINCIPAL PROBLEM:  Encephalopathy, metabolic [G93.41] 01/06/2025 Yes    Neurosyphilis [A52.3] 01/24/2025 Yes    Pressure injury of sacral region, stage 3 [L89.153] 01/29/2025 Yes    Blood blister [T14.8XXA] 01/29/2025 Yes    Homelessness [Z59.00] 01/28/2025 Not Applicable    Elevated blood pressure reading [R03.0] 01/28/2025 Yes    Hypercoagulable state [D68.59] 01/27/2025 Yes    Pancytopenia [D61.818] 01/27/2025 No    Agitation [R45.1] 01/27/2025 Yes    Dementia with psychotic disturbance [F03.92] 01/27/2025 Yes    Quadriparesis [G82.50] 01/27/2025 Yes    Severe malnutrition [E43] 01/27/2025 Yes    Acute encephalopathy [G93.40] 01/27/2025 Yes    Bacteremia [R78.81] 01/27/2025 Yes    Adrenal insufficiency [E27.40] 01/25/2025 Yes    Tobacco dependency [F17.200] 01/23/2025 Yes    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] 01/23/2025 Yes    Delirium [R41.0] 01/19/2025 Yes    Secondary spontaneous pneumothorax [J93.12] 01/11/2025 Yes    Drug dependence [F19.20] 01/05/2025 Yes    Thrombocytopenia [D69.6] 01/05/2025 Yes    Colon cancer [C18.9] 12/01/2022 Yes      Problems Resolved During this Admission:    Diagnosis Date Noted Date Resolved POA    Hypophosphatemia [E83.39] 01/29/2025 01/30/2025 Yes    Hypokalemia [E87.6] 01/27/2025 01/30/2025 No    Schizophrenia [F20.9] 01/27/2025 01/31/2025 Yes    Hypernatremia [E87.0] 01/15/2025 01/30/2025 Yes    MSSA bacteremia [R78.81, B95.61] 01/11/2025 01/30/2025 Yes    Influenza A with pneumonia [J09.X1] 01/11/2025 01/30/2025 Yes    Acute hypoxemic respiratory  failure [J96.01] 01/06/2025 01/30/2025 Yes    Sepsis with acute organ dysfunction [A41.9, R65.20] 01/05/2025 01/30/2025 Yes       Discharged Condition: good    Disposition: Another Health Care Inst*    Follow Up:    Patient Instructions:      Ambulatory referral/consult to Neurology   Standing Status: Future   Referral Priority: Routine Referral Type: Consultation   Referral Reason: Specialty Services Required   Requested Specialty: Neurology   Number of Visits Requested: 1          Pending Diagnostic Studies:       Procedure Component Value Units Date/Time    Freeze and Hold, [facility] [[id number]] Collected: 01/21/25 1549    Order Status: Sent Lab Status: No result     Specimen: CSF (Spinal Fluid) from Cerebrospinal Fluid     Niacin (vitamin B3) [[id number]] Collected: 01/30/25 0759    Order Status: Sent Lab Status: In process Updated: 01/30/25 0818    Specimen: Blood     Narrative:      Collection has been rescheduled by [email or web address] at 01/30/2025 05:03 Reason:   Unable to collect rest of tubes. Pt refused 2nd stick after veon   blew. Nursetiffany [id number]  Collection has been rescheduled by [email or web address] at 01/30/2025 05:03 Reason:   Unable to collect rest of tubes. Pt refused 2nd stick after veon   blew. Nursetiffany [id number]    Vitamin B1 [[id number]] Collected: 01/30/25 0318    Order Status: Sent Lab Status: In process Updated: 01/30/25 0448    Specimen: Blood     Vitamin B6 [[id number]] Collected: 01/30/25 0318    Order Status: Sent Lab Status: In process Updated: 01/30/25 0448    Specimen: Blood            Medications:  Reconciled Home Medications:      Medication List        START taking these medications      D5W SolP 500 mL with penicillin G potassium 5 million unit SolR 24 Million Units  Inject 24 Million Units into the vein once daily. CONTINUOUS INFUSION     folic acid-vit B6-vit B12 2.5-25-2 mg 2.5-25-2 mg Tab  Commonly known as: FOLBIC or Equiv  Take 1 tablet by mouth once daily.     hydrALAZINE 25 MG tablet  Commonly known as:  APRESOLINE  Take 1 tablet (25 mg total) by mouth every 4 (four) hours as needed (SBP >200 or DBP >100).     magnesium oxide 400 mg (241.3 mg magnesium) tablet  Commonly known as: MAG-OX  Take 1 tablet (400 mg total) by mouth 2 (two) times daily.     mirtazapine 7.5 MG Tab  Commonly known as: REMERON  Take 1 tablet (7.5 mg total) by mouth every evening.     potassium chloride 10 MEQ Cpsr  Commonly known as: MICRO-K  Take 3 capsules (30 mEq total) by mouth once daily. DO NOT CRUSH OR CHEW; SWALLOW WHOLE.     potassium, sodium phosphates 280-160-250 mg Pwpk  Commonly known as: PHOS-NAK  Take 2 packets by mouth 4 (four) times daily before meals and nightly.     * QUEtiapine 100 MG Tab  Commonly known as: SEROQUEL  Take 1 tablet (100 mg total) by mouth every evening.     * QUEtiapine 25 MG Tab  Commonly known as: SEROQUEL  Take 1 tablet (25 mg total) by mouth every 6 (six) hours as needed (non-directed agitaiton).     thiamine 100 MG tablet  Take 1 tablet (100 mg total) by mouth once daily.           * This list has 2 medication(s) that are the same as other medications prescribed for you. Read the directions carefully, and ask your doctor or other care provider to review them with you.                CONTINUE taking these medications      levETIRAcetam 500 MG Tab  Commonly known as: KEPPRA  TAKE ONE TABLET BY MOUTH 2 TIMES A DAY            STOP taking these medications      atorvastatin 10 MG tablet  Commonly known as: LIPITOR     benztropine 1 MG tablet  Commonly known as: COGENTIN     carBAMazepine 200 mg tablet  Commonly known as: TEGRETOL     chlorproMAZINE 100 MG tablet  Commonly known as: THORAZINE     clonazePAM 1 MG tablet  Commonly known as: KlonoPIN     ibuprofen 800 MG tablet  Commonly known as: ADVIL,MOTRIN     LINZESS 290 mcg Cap capsule  Generic drug: linaCLOtide     lurasidone 80 mg Tab tablet  Commonly known as: LATUDA     prazosin 1 MG Cap  Commonly known as: MINIPRESS            ASK your doctor about  these medications      hydrocortisone 10 MG Tab  Commonly known as: CORTEF  Take 1 tablet (10 mg total) by mouth 2 (two) times daily. for 2 days  Ask about: Should I take this medication?              Indwelling Lines/Drains at time of discharge:   Lines/Drains/Airways       None                   Time spent on the discharge of patient: 25 minutes         Jorge Degroot MD  Department of Hospital Medicine  [facility] Neurosurgery ([facility])

## 2025-01-31 NOTE — NURSING
Pt [facility] this [facility] Pt cleaned with disposable brief placed on pt for incontinence. Pt alert and VS WNL (see flowsheet). Pt remains oriented to person and self. Minimum movement in lower extremities and able to move upper extremities without difficulty. Ordered antibiotics infusing at this time. Report called to [facility] nurse Kellie Donnelly at this time.

## 2025-01-31 NOTE — PLAN OF CARE
Problem: Adult Inpatient Plan of Care  Goal: Plan of Care Review  Outcome: Met  Goal: Patient-Specific Goal (Individualized)  Description: Pt will maintain sbp below 200.  Outcome: Met  Goal: Absence of Hospital-Acquired Illness or Injury  Outcome: Met  Goal: Optimal Comfort and Wellbeing  Outcome: Met  Goal: Readiness for Transition of Care  Outcome: Met     Problem: Sepsis/Septic Shock  Goal: Optimal Coping  Outcome: Met  Goal: Absence of Bleeding  Outcome: Met  Goal: Blood Glucose Level Within Targeted Range  Outcome: Met  Goal: Absence of Infection Signs and Symptoms  Outcome: Met  Goal: Optimal Nutrition Intake  Outcome: Met     Problem: Acute Kidney Injury/Impairment  Goal: Fluid and Electrolyte Balance  Outcome: Met  Goal: Improved Oral Intake  Outcome: Met  Goal: Effective Renal Function  Outcome: Met     Problem: Pneumonia  Goal: Fluid Balance  Outcome: Met  Goal: Resolution of Infection Signs and Symptoms  Outcome: Met  Goal: Effective Oxygenation and Ventilation  Outcome: Met     Problem: ARDS (Acute Respiratory Distress Syndrome)  Goal: Effective Oxygenation  Outcome: Met     Problem: Wound  Goal: Optimal Coping  Outcome: Met  Goal: Optimal Functional Ability  Outcome: Met  Goal: Absence of Infection Signs and Symptoms  Outcome: Met  Goal: Improved Oral Intake  Outcome: Met  Goal: Optimal Pain Control and Function  Outcome: Met  Goal: Skin Health and Integrity  Outcome: Met  Goal: Optimal Wound Healing  Outcome: Met     Problem: Skin Injury Risk Increased  Goal: Skin Health and Integrity  Outcome: Met     Problem: Fall Injury Risk  Goal: Absence of Fall and Fall-Related Injury  Outcome: Met     Problem: Infection  Goal: Absence of Infection Signs and Symptoms  Outcome: Met

## 2025-02-04 LAB
NIACIN SERPL-MCNC: <5 NG/ML
NICOTINAMIDE SERPL-MCNC: 23.6 NG/ML (ref 5–48)
NICOTINURATE SERPL-MCNC: <5 NG/ML
PYRIDOXAL SERPL-MCNC: 13 UG/L (ref 5–50)
VIT B1 BLD-MCNC: 88 UG/L (ref 38–122)

## 2025-03-02 PROBLEM — K62.5 RECTAL BLEEDING: Status: ACTIVE | Noted: 2025-03-02

## 2025-03-03 PROBLEM — J93.12 SECONDARY SPONTANEOUS PNEUMOTHORAX: Status: RESOLVED | Noted: 2025-01-11 | Resolved: 2025-03-03

## 2025-03-03 PROBLEM — G93.40 ACUTE ENCEPHALOPATHY: Status: RESOLVED | Noted: 2025-01-27 | Resolved: 2025-03-03

## 2025-03-03 PROBLEM — K62.5 RECTAL BLEEDING: Status: RESOLVED | Noted: 2025-03-02 | Resolved: 2025-03-03

## 2025-03-03 PROBLEM — D61.818 PANCYTOPENIA: Status: RESOLVED | Noted: 2025-01-27 | Resolved: 2025-03-03

## 2025-03-03 PROBLEM — E27.40 ADRENAL INSUFFICIENCY: Status: RESOLVED | Noted: 2025-01-25 | Resolved: 2025-03-03

## 2025-05-07 ENCOUNTER — EXTERNAL HOME HEALTH (OUTPATIENT)
Dept: HOME HEALTH SERVICES | Facility: HOSPITAL | Age: 65
End: 2025-05-07
Payer: MEDICAID

## 2025-05-20 ENCOUNTER — DOCUMENT SCAN (OUTPATIENT)
Dept: HOME HEALTH SERVICES | Facility: HOSPITAL | Age: 65
End: 2025-05-20
Payer: MEDICAID

## 2025-05-23 ENCOUNTER — TELEPHONE (OUTPATIENT)
Dept: NEUROLOGY | Facility: CLINIC | Age: 65
End: 2025-05-23
Payer: MEDICAID

## 2025-05-23 NOTE — TELEPHONE ENCOUNTER
Spoke with patient regarding referral received from Bina Holguin NP, patient declined to schedule stating she has an appt with Dr. Malik at [facility] in June.

## 2025-06-04 ENCOUNTER — DOCUMENT SCAN (OUTPATIENT)
Dept: HOME HEALTH SERVICES | Facility: HOSPITAL | Age: 65
End: 2025-06-04
Payer: MEDICAID